# Patient Record
Sex: FEMALE | Race: BLACK OR AFRICAN AMERICAN | Employment: OTHER | URBAN - METROPOLITAN AREA
[De-identification: names, ages, dates, MRNs, and addresses within clinical notes are randomized per-mention and may not be internally consistent; named-entity substitution may affect disease eponyms.]

---

## 2017-12-08 ENCOUNTER — APPOINTMENT (OUTPATIENT)
Dept: GENERAL RADIOLOGY | Age: 82
End: 2017-12-08
Attending: EMERGENCY MEDICINE
Payer: MEDICARE

## 2017-12-08 ENCOUNTER — HOSPITAL ENCOUNTER (EMERGENCY)
Age: 82
Discharge: HOME | End: 2017-12-08
Attending: EMERGENCY MEDICINE
Payer: MEDICARE

## 2017-12-08 VITALS
DIASTOLIC BLOOD PRESSURE: 85 MMHG | WEIGHT: 180 LBS | OXYGEN SATURATION: 98 % | TEMPERATURE: 98.4 F | SYSTOLIC BLOOD PRESSURE: 184 MMHG | HEIGHT: 63 IN | HEART RATE: 70 BPM | BODY MASS INDEX: 31.89 KG/M2 | RESPIRATION RATE: 14 BRPM

## 2017-12-08 DIAGNOSIS — R10.9 FLANK PAIN: Primary | ICD-10-CM

## 2017-12-08 LAB
ATRIAL RATE: 59 BPM
BACTERIA URNS QL MICRO: 0 /HPF
CALCULATED P AXIS, ECG09: 32 DEGREES
CALCULATED R AXIS, ECG10: 3 DEGREES
CALCULATED T AXIS, ECG11: 41 DEGREES
CASTS URNS QL MICRO: NORMAL /LPF
DIAGNOSIS, 93000: NORMAL
EPI CELLS #/AREA URNS HPF: NORMAL /HPF
P-R INTERVAL, ECG05: 154 MS
Q-T INTERVAL, ECG07: 440 MS
QRS DURATION, ECG06: 74 MS
QTC CALCULATION (BEZET), ECG08: 435 MS
RBC #/AREA URNS HPF: NORMAL /HPF
VENTRICULAR RATE, ECG03: 59 BPM
WBC URNS QL MICRO: NORMAL /HPF

## 2017-12-08 PROCEDURE — 93005 ELECTROCARDIOGRAM TRACING: CPT | Performed by: EMERGENCY MEDICINE

## 2017-12-08 PROCEDURE — 81003 URINALYSIS AUTO W/O SCOPE: CPT | Performed by: EMERGENCY MEDICINE

## 2017-12-08 PROCEDURE — 99284 EMERGENCY DEPT VISIT MOD MDM: CPT | Performed by: EMERGENCY MEDICINE

## 2017-12-08 PROCEDURE — 81015 MICROSCOPIC EXAM OF URINE: CPT | Performed by: EMERGENCY MEDICINE

## 2017-12-08 PROCEDURE — 71020 XR CHEST PA LAT: CPT

## 2017-12-08 RX ORDER — LIDOCAINE 50 MG/G
PATCH TOPICAL
Qty: 20 EACH | Refills: 0 | Status: SHIPPED | OUTPATIENT
Start: 2017-12-08

## 2017-12-08 NOTE — ED NOTES
I have reviewed discharge instructions with the patient. The patient verbalized understanding. Patient left ED via Discharge Method: ambulatory to Home with (insert name of family/friend, self, transport self). Opportunity for questions and clarification provided. Patient given 1 scripts. To continue your aftercare when you leave the hospital, you may receive an automated call from our care team to check in on how you are doing. This is a free service and part of our promise to provide the best care and service to meet your aftercare needs.  If you have questions, or wish to unsubscribe from this service please call [phone]. Thank you for Choosing our [facility] Emergency Department.

## 2017-12-08 NOTE — DISCHARGE INSTRUCTIONS
Flank Pain: Care Instructions  Your Care Instructions  Flank pain is pain on the side of the back just below the rib cage and above the waist. It can be on one or both sides. Flank pain has many possible causes, including a kidney stone, a urinary tract infection, or back strain. Flank pain may get better on its own. But don't ignore new symptoms, such as fever, nausea and vomiting, urination problems, pain that gets worse, and dizziness. These may be signs of a more serious problem. You may have to have tests or other treatment. Follow-up care is a key part of your treatment and safety. Be sure to make and go to all appointments, and call your doctor if you are having problems. It's also a good idea to know your test results and keep a list of the medicines you take. How can you care for yourself at home? · Rest until you feel better. · Take pain medicines exactly as directed. ¨ If the doctor gave you a prescription medicine for pain, take it as prescribed. ¨ If you are not taking a prescription pain medicine, ask your doctor if you can take an over-the-counter pain medicine, such as acetaminophen (Tylenol), ibuprofen (Advil, Motrin), or naproxen (Aleve). Read and follow all instructions on the label. · If your doctor prescribed antibiotics, take them as directed. Do not stop taking them just because you feel better. You need to take the full course of antibiotics. · To apply heat, put a warm water bottle, a heating pad set on low, or a warm cloth on the painful area. Do not go to sleep with a heating pad on your skin. · To prevent dehydration, drink plenty of fluids, enough so that your urine is light yellow or clear like water. Choose water and other caffeine-free clear liquids until you feel better. If you have kidney, heart, or liver disease and have to limit fluids, talk with your doctor before you increase the amount of fluids you drink. When should you call for help?   Call your doctor now or seek immediate medical care if:  ? · Your flank pain gets worse. ? · You have new symptoms, such as fever, nausea, or vomiting. ? · You have symptoms of a urinary problem. For example:  ¨ You have blood or pus in your urine. ¨ You have chills or body aches. ¨ It hurts to urinate. ¨ You have groin or belly pain. ? Watch closely for changes in your health, and be sure to contact your doctor if you do not get better as expected. Where can you learn more? Go to http://royce-brian.info/. Enter S191 in the search box to learn more about \"Flank Pain: Care Instructions. \"  Current as of: March 20, 2017  Content Version: 11.4  © 9141-2173 [employer]. Care instructions adapted under license by [employer] (which disclaims liability or warranty for this information). If you have questions about a medical condition or this instruction, always ask your healthcare professional. [place] [address] any warranty or liability for your use of this information. Recent Results (from the past 8 hour(s))   URINE MICROSCOPIC    Collection Time: 12/08/17  9:48 AM   Result Value Ref Range    WBC 5-10 0 /hpf    RBC 0-3 0 /hpf    Epithelial cells 5-10 0 /hpf    Bacteria 0 0 /hpf    Casts 3-5 0 /lpf     Xr Chest Pa Lat    Result Date: 12/8/2017  CHEST X-RAY PA AND LATERAL : 12/8/2017 Indication: Chest and left flank pain Comparison: None Findings: The lung fields are clear. The heart, mediastinum, soft tissues, and bony structures are remarkable for thoracic scoliosis and a hiatal hernia. IMPRESSION: Hiatal hernia, thoracic scoliosis, clear lung fields.

## 2017-12-08 NOTE — ED NOTES
Patient noted to be sleeping, respirations are even and non-labored at this time, family not at bedside at this time.

## 2017-12-08 NOTE — ED TRIAGE NOTES
Pt to er c/o having left flank pain on and off for several months. .. Denies fever at home. .. Denies n/v//d. .. Pt denies chest pain. .. sts she is sob at times

## 2017-12-08 NOTE — ED PROVIDER NOTES
HPI Comments: 81-year-old female brought to the emergency department today for intermittent left flank pain for the last several weeks. It waxes and wanes. Pain last night. Recurred this morning    They called her primary care physician who told her to call an ambulance and go to the emergency department immediately as they could not see her today    No fever  No chills  No chest pain  No shortness of breath  occ wheezing    Patient is a 81 y.o. female presenting with flank pain. Flank Pain    Pertinent negatives include no chest pain, no fever and no abdominal pain. Past Medical History:   Diagnosis Date    Gastrointestinal Disorder     acid reflux    Hypertension        Past Surgical History:   Procedure Laterality Date    HX GYN      cervical CA, hysterectomy    HX ORTHOPAEDIC      knee         No family history on file. Social History     Social History    Marital status:      Spouse name: N/A    Number of children: N/A    Years of education: N/A     Occupational History    Not on file. Social History Main Topics    Smoking status: Never Smoker    Smokeless tobacco: Not on file    Alcohol use No    Drug use: No    Sexual activity: No     Other Topics Concern    Not on file     Social History Narrative    No narrative on file         ALLERGIES: Review of patient's allergies indicates no known allergies. Review of Systems   Constitutional: Negative for activity change, appetite change and fever. HENT: Negative. Eyes: Negative. Respiratory: Positive for wheezing. Negative for cough and shortness of breath. Cardiovascular: Negative for chest pain and palpitations. Gastrointestinal: Negative for abdominal pain, nausea and vomiting. Genitourinary: Positive for difficulty urinating, flank pain and frequency. Psychiatric/Behavioral: Negative.         Vitals:    12/08/17 0903   BP: (!) 196/98   Pulse: 71   Resp: 15   Temp: 98.4 °F (36.9 °C)   SpO2: 98%   Weight: 81.6 kg (180 lb)   Height: 5' 3\" (1.6 m)            Physical Exam   Constitutional: She is oriented to person, place, and time. She appears well-developed and well-nourished. HENT:   Head: Normocephalic and atraumatic. Eyes: Pupils are equal, round, and reactive to light. Cardiovascular: Normal rate and regular rhythm. Pulmonary/Chest: Breath sounds normal. No respiratory distress. She has no wheezes. She has no rales. She exhibits no tenderness. Abdominal: Soft. She exhibits no distension. There is no tenderness. Neurological: She is alert and oriented to person, place, and time. Skin: Skin is warm and dry. Nursing note and vitals reviewed. MDM  Number of Diagnoses or Management Options  Diagnosis management comments: Well-appearing 62-year-old female with left flank pain    Check a chest x-ray. Obtain urine. Obtain an EKG. Unremarkable physical exam.  No rash. No lesions// nothing suggestive of herpes zoster    Lungs are clear.     ED Course       Procedures

## 2020-11-27 ENCOUNTER — APPOINTMENT (OUTPATIENT)
Dept: CT IMAGING | Age: 85
DRG: 418 | End: 2020-11-27
Attending: EMERGENCY MEDICINE
Payer: MEDICARE

## 2020-11-27 ENCOUNTER — HOSPITAL ENCOUNTER (INPATIENT)
Age: 85
LOS: 10 days | Discharge: NURSING, REHAB OR LONG-TERM CARE | DRG: 418 | End: 2020-12-07
Attending: EMERGENCY MEDICINE | Admitting: SURGERY
Payer: MEDICARE

## 2020-11-27 ENCOUNTER — APPOINTMENT (OUTPATIENT)
Dept: GENERAL RADIOLOGY | Age: 85
DRG: 418 | End: 2020-11-27
Attending: EMERGENCY MEDICINE
Payer: MEDICARE

## 2020-11-27 DIAGNOSIS — R11.2 NON-INTRACTABLE VOMITING WITH NAUSEA: ICD-10-CM

## 2020-11-27 DIAGNOSIS — K81.0 ACUTE CHOLECYSTITIS: ICD-10-CM

## 2020-11-27 DIAGNOSIS — R77.8 ELEVATED TROPONIN: ICD-10-CM

## 2020-11-27 DIAGNOSIS — K44.9 LARGE HIATAL HERNIA: ICD-10-CM

## 2020-11-27 DIAGNOSIS — K31.89 ORGANOAXIAL GASTRIC VOLVULUS: Primary | ICD-10-CM

## 2020-11-27 DIAGNOSIS — R10.13 EPIGASTRIC PAIN: ICD-10-CM

## 2020-11-27 DIAGNOSIS — K80.00 CALCULUS OF GALLBLADDER WITH ACUTE CHOLECYSTITIS WITHOUT OBSTRUCTION: ICD-10-CM

## 2020-11-27 DIAGNOSIS — E87.6 HYPOKALEMIA: ICD-10-CM

## 2020-11-27 DIAGNOSIS — R94.31 ABNORMAL EKG: ICD-10-CM

## 2020-11-27 DIAGNOSIS — K80.00 ACUTE CALCULOUS CHOLECYSTITIS: ICD-10-CM

## 2020-11-27 DIAGNOSIS — I10 UNCONTROLLED HYPERTENSION: ICD-10-CM

## 2020-11-27 DIAGNOSIS — I10 HYPERTENSION, UNSPECIFIED TYPE: ICD-10-CM

## 2020-11-27 DIAGNOSIS — R07.9 CHEST PAIN, UNSPECIFIED TYPE: ICD-10-CM

## 2020-11-27 DIAGNOSIS — K80.20 GALLSTONES: ICD-10-CM

## 2020-11-27 PROBLEM — R07.89 OTHER CHEST PAIN: Status: ACTIVE | Noted: 2020-11-27

## 2020-11-27 LAB
ALBUMIN SERPL-MCNC: 3.6 G/DL (ref 3.2–4.6)
ALBUMIN/GLOB SERPL: 0.7 {RATIO} (ref 1.2–3.5)
ALP SERPL-CCNC: 112 U/L (ref 50–136)
ALT SERPL-CCNC: 16 U/L (ref 12–65)
ANION GAP SERPL CALC-SCNC: 11 MMOL/L (ref 7–16)
AST SERPL-CCNC: 28 U/L (ref 15–37)
BACTERIA URNS QL MICRO: 0 /HPF
BASOPHILS # BLD: 0 K/UL (ref 0–0.2)
BASOPHILS NFR BLD: 0 % (ref 0–2)
BILIRUB SERPL-MCNC: 0.7 MG/DL (ref 0.2–1.1)
BUN SERPL-MCNC: 12 MG/DL (ref 8–23)
CALCIUM SERPL-MCNC: 9 MG/DL (ref 8.3–10.4)
CASTS URNS QL MICRO: 0 /LPF
CHLORIDE SERPL-SCNC: 100 MMOL/L (ref 98–107)
CO2 SERPL-SCNC: 27 MMOL/L (ref 21–32)
CREAT SERPL-MCNC: 0.77 MG/DL (ref 0.6–1)
CRYSTALS URNS QL MICRO: 0 /LPF
DIFFERENTIAL METHOD BLD: ABNORMAL
EOSINOPHIL # BLD: 0 K/UL (ref 0–0.8)
EOSINOPHIL NFR BLD: 0 % (ref 0.5–7.8)
EPI CELLS #/AREA URNS HPF: 0 /HPF
ERYTHROCYTE [DISTWIDTH] IN BLOOD BY AUTOMATED COUNT: 15.3 % (ref 11.9–14.6)
GLOBULIN SER CALC-MCNC: 4.9 G/DL (ref 2.3–3.5)
GLUCOSE SERPL-MCNC: 124 MG/DL (ref 65–100)
HCT VFR BLD AUTO: 44.6 % (ref 35.8–46.3)
HGB BLD-MCNC: 14.6 G/DL (ref 11.7–15.4)
IMM GRANULOCYTES # BLD AUTO: 0.1 K/UL (ref 0–0.5)
IMM GRANULOCYTES NFR BLD AUTO: 0 % (ref 0–5)
LACTATE SERPL-SCNC: 1 MMOL/L (ref 0.4–2)
LIPASE SERPL-CCNC: 72 U/L (ref 73–393)
LYMPHOCYTES # BLD: 1.4 K/UL (ref 0.5–4.6)
LYMPHOCYTES NFR BLD: 8 % (ref 13–44)
MCH RBC QN AUTO: 26.4 PG (ref 26.1–32.9)
MCHC RBC AUTO-ENTMCNC: 32.7 G/DL (ref 31.4–35)
MCV RBC AUTO: 80.5 FL (ref 79.6–97.8)
MONOCYTES # BLD: 1 K/UL (ref 0.1–1.3)
MONOCYTES NFR BLD: 6 % (ref 4–12)
MUCOUS THREADS URNS QL MICRO: 0 /LPF
NEUTS SEG # BLD: 14.8 K/UL (ref 1.7–8.2)
NEUTS SEG NFR BLD: 86 % (ref 43–78)
NRBC # BLD: 0 K/UL (ref 0–0.2)
OTHER OBSERVATIONS,UCOM: NORMAL
PLATELET # BLD AUTO: 286 K/UL (ref 150–450)
PMV BLD AUTO: 10.8 FL (ref 9.4–12.3)
POTASSIUM SERPL-SCNC: 3.1 MMOL/L (ref 3.5–5.1)
PROCALCITONIN SERPL-MCNC: <0.05 NG/ML
PROT SERPL-MCNC: 8.5 G/DL (ref 6.3–8.2)
RBC # BLD AUTO: 5.54 M/UL (ref 4.05–5.2)
RBC #/AREA URNS HPF: 0 /HPF
SODIUM SERPL-SCNC: 138 MMOL/L (ref 136–145)
TROPONIN-HIGH SENSITIVITY: 56.7 PG/ML (ref 0–14)
WBC # BLD AUTO: 17.3 K/UL (ref 4.3–11.1)
WBC URNS QL MICRO: NORMAL /HPF

## 2020-11-27 PROCEDURE — 74011000250 HC RX REV CODE- 250: Performed by: SURGERY

## 2020-11-27 PROCEDURE — 74011000636 HC RX REV CODE- 636: Performed by: EMERGENCY MEDICINE

## 2020-11-27 PROCEDURE — 81015 MICROSCOPIC EXAM OF URINE: CPT

## 2020-11-27 PROCEDURE — 96375 TX/PRO/DX INJ NEW DRUG ADDON: CPT

## 2020-11-27 PROCEDURE — 80053 COMPREHEN METABOLIC PANEL: CPT

## 2020-11-27 PROCEDURE — 84145 PROCALCITONIN (PCT): CPT

## 2020-11-27 PROCEDURE — 85025 COMPLETE CBC W/AUTO DIFF WBC: CPT

## 2020-11-27 PROCEDURE — 2709999900 HC NON-CHARGEABLE SUPPLY

## 2020-11-27 PROCEDURE — 96361 HYDRATE IV INFUSION ADD-ON: CPT

## 2020-11-27 PROCEDURE — 84484 ASSAY OF TROPONIN QUANT: CPT

## 2020-11-27 PROCEDURE — 96374 THER/PROPH/DIAG INJ IV PUSH: CPT

## 2020-11-27 PROCEDURE — 65660000000 HC RM CCU STEPDOWN

## 2020-11-27 PROCEDURE — 99223 1ST HOSP IP/OBS HIGH 75: CPT | Performed by: SURGERY

## 2020-11-27 PROCEDURE — 83605 ASSAY OF LACTIC ACID: CPT

## 2020-11-27 PROCEDURE — 83690 ASSAY OF LIPASE: CPT

## 2020-11-27 PROCEDURE — 74011250636 HC RX REV CODE- 250/636: Performed by: EMERGENCY MEDICINE

## 2020-11-27 PROCEDURE — 77030038269 HC DRN EXT URIN PURWCK BARD -A

## 2020-11-27 PROCEDURE — 74011000258 HC RX REV CODE- 258: Performed by: EMERGENCY MEDICINE

## 2020-11-27 PROCEDURE — 74011000250 HC RX REV CODE- 250: Performed by: NURSE PRACTITIONER

## 2020-11-27 PROCEDURE — 71045 X-RAY EXAM CHEST 1 VIEW: CPT

## 2020-11-27 PROCEDURE — 93005 ELECTROCARDIOGRAM TRACING: CPT | Performed by: EMERGENCY MEDICINE

## 2020-11-27 PROCEDURE — 74177 CT ABD & PELVIS W/CONTRAST: CPT

## 2020-11-27 PROCEDURE — 74011250636 HC RX REV CODE- 250/636: Performed by: SURGERY

## 2020-11-27 PROCEDURE — 81003 URINALYSIS AUTO W/O SCOPE: CPT

## 2020-11-27 PROCEDURE — 99284 EMERGENCY DEPT VISIT MOD MDM: CPT

## 2020-11-27 PROCEDURE — 51701 INSERT BLADDER CATHETER: CPT

## 2020-11-27 RX ORDER — DEXTROSE, SODIUM CHLORIDE, AND POTASSIUM CHLORIDE 5; .45; .15 G/100ML; G/100ML; G/100ML
100 INJECTION INTRAVENOUS CONTINUOUS
Status: DISCONTINUED | OUTPATIENT
Start: 2020-11-27 | End: 2020-11-28

## 2020-11-27 RX ORDER — OMEPRAZOLE 20 MG/1
20 CAPSULE, DELAYED RELEASE ORAL 2 TIMES DAILY
COMMUNITY

## 2020-11-27 RX ORDER — ONDANSETRON 2 MG/ML
4 INJECTION INTRAMUSCULAR; INTRAVENOUS
Status: COMPLETED | OUTPATIENT
Start: 2020-11-27 | End: 2020-11-27

## 2020-11-27 RX ORDER — ENALAPRILAT 1.25 MG/ML
1.25 INJECTION INTRAVENOUS EVERY 6 HOURS
Status: DISCONTINUED | OUTPATIENT
Start: 2020-11-28 | End: 2020-11-28

## 2020-11-27 RX ORDER — MORPHINE SULFATE 10 MG/ML
2 INJECTION, SOLUTION INTRAMUSCULAR; INTRAVENOUS
Status: DISCONTINUED | OUTPATIENT
Start: 2020-11-27 | End: 2020-11-30 | Stop reason: SDUPTHER

## 2020-11-27 RX ORDER — ACETAMINOPHEN 500 MG
1000 TABLET ORAL
Status: DISCONTINUED | OUTPATIENT
Start: 2020-11-27 | End: 2020-11-27

## 2020-11-27 RX ORDER — SODIUM CHLORIDE 0.9 % (FLUSH) 0.9 %
10 SYRINGE (ML) INJECTION
Status: COMPLETED | OUTPATIENT
Start: 2020-11-27 | End: 2020-11-27

## 2020-11-27 RX ORDER — ONDANSETRON 2 MG/ML
4 INJECTION INTRAMUSCULAR; INTRAVENOUS
Status: DISCONTINUED | OUTPATIENT
Start: 2020-11-27 | End: 2020-12-07 | Stop reason: HOSPADM

## 2020-11-27 RX ORDER — ENOXAPARIN SODIUM 100 MG/ML
40 INJECTION SUBCUTANEOUS EVERY 24 HOURS
Status: DISCONTINUED | OUTPATIENT
Start: 2020-11-28 | End: 2020-12-01

## 2020-11-27 RX ORDER — MORPHINE SULFATE 4 MG/ML
4 INJECTION INTRAVENOUS
Status: COMPLETED | OUTPATIENT
Start: 2020-11-27 | End: 2020-11-27

## 2020-11-27 RX ORDER — METOPROLOL TARTRATE 5 MG/5ML
5 INJECTION INTRAVENOUS
Status: DISCONTINUED | OUTPATIENT
Start: 2020-11-27 | End: 2020-11-28

## 2020-11-27 RX ORDER — MORPHINE SULFATE 10 MG/ML
2 INJECTION, SOLUTION INTRAMUSCULAR; INTRAVENOUS
Status: DISCONTINUED | OUTPATIENT
Start: 2020-11-27 | End: 2020-11-27

## 2020-11-27 RX ORDER — TRAMADOL HYDROCHLORIDE 50 MG/1
50 TABLET ORAL
Status: ON HOLD | COMMUNITY
End: 2020-12-23 | Stop reason: SDUPTHER

## 2020-11-27 RX ADMIN — PIPERACILLIN SODIUM AND TAZOBACTAM SODIUM 4.5 G: 4; .5 INJECTION, POWDER, LYOPHILIZED, FOR SOLUTION INTRAVENOUS at 18:32

## 2020-11-27 RX ADMIN — IOPAMIDOL 100 ML: 755 INJECTION, SOLUTION INTRAVENOUS at 16:26

## 2020-11-27 RX ADMIN — SODIUM CHLORIDE 1000 ML: 900 INJECTION, SOLUTION INTRAVENOUS at 17:17

## 2020-11-27 RX ADMIN — Medication 10 ML: at 16:26

## 2020-11-27 RX ADMIN — SODIUM CHLORIDE 100 ML: 900 INJECTION, SOLUTION INTRAVENOUS at 16:26

## 2020-11-27 RX ADMIN — FAMOTIDINE 20 MG: 10 INJECTION INTRAVENOUS at 22:07

## 2020-11-27 RX ADMIN — ENALAPRILAT 1.25 MG: 1.25 INJECTION INTRAVENOUS at 22:32

## 2020-11-27 RX ADMIN — MORPHINE SULFATE 4 MG: 4 INJECTION INTRAVENOUS at 17:16

## 2020-11-27 RX ADMIN — ONDANSETRON 4 MG: 2 INJECTION INTRAMUSCULAR; INTRAVENOUS at 17:17

## 2020-11-27 RX ADMIN — POTASSIUM CHLORIDE, DEXTROSE MONOHYDRATE AND SODIUM CHLORIDE 100 ML/HR: 150; 5; 450 INJECTION, SOLUTION INTRAVENOUS at 22:02

## 2020-11-27 NOTE — ED TRIAGE NOTES
Pt arrives via EMS from home. Sent by urgent care for abd pain with nv for further evaluation. Was tested there and was covid negative. States symptoms started at [facility] yesterday. , /120. Given 4mg zofran and 750 mL of normal saline with EMS.

## 2020-11-27 NOTE — ED PROVIDER NOTES
Patient started yesterday at 9 AM with epigastric and diffuse abdominal pain radiating outwards. Has some pain rating up into the chest.  Has some pain radiating to bilateral lower flanks. Nausea and vomiting present. No dysuria hematuria. No diarrhea or constipation. No shortness of breath. Went to PCP or urgent care and sent here for further evaluation. The history is provided by the patient. No  was used. Abdominal Pain    This is a new problem. The current episode started yesterday. The problem occurs constantly. The problem has been gradually worsening. The pain is associated with an unknown factor. The pain is located in the epigastric region and generalized abdominal region. The quality of the pain is aching and sharp. The pain is moderate. Associated symptoms include nausea and vomiting. Pertinent negatives include no fever, no diarrhea, no melena, no constipation, no dysuria, no hematuria, no headaches, no chest pain and no back pain. The pain is worsened by palpation. The pain is relieved by nothing. Her past medical history is significant for GERD. The patient's surgical history includes hysterectomy. Past Medical History:   Diagnosis Date    Gastrointestinal disorder     acid reflux    Hypertension        Past Surgical History:   Procedure Laterality Date    HX GYN      cervical CA, hysterectomy    HX ORTHOPAEDIC      knee         History reviewed. No pertinent family history.     Social History     Socioeconomic History    Marital status:      Spouse name: Not on file    Number of children: Not on file    Years of education: Not on file    Highest education level: Not on file   Occupational History    Not on file   Social Needs    Financial resource strain: Not on file    Food insecurity     Worry: Not on file     Inability: Not on file    Transportation needs     Medical: Not on file     Non-medical: Not on file   Tobacco Use    Smoking status: Never Smoker    Smokeless tobacco: Never Used   Substance and Sexual Activity    Alcohol use: No    Drug use: No    Sexual activity: Never   Lifestyle    Physical activity     Days per week: Not on file     Minutes per session: Not on file    Stress: Not on file   Relationships    Social connections     Talks on phone: Not on file     Gets together: Not on file     Attends Anabaptist service: Not on file     Active member of club or organization: Not on file     Attends meetings of clubs or organizations: Not on file     Relationship status: Not on file    Intimate partner violence     Fear of current or ex partner: Not on file     Emotionally abused: Not on file     Physically abused: Not on file     Forced sexual activity: Not on file   Other Topics Concern    Not on file   Social History Narrative    Not on file         ALLERGIES: Patient has no known allergies. Review of Systems   Constitutional: Negative for chills and fever. HENT: Negative for rhinorrhea and sore throat. Eyes: Negative for pain and redness. Respiratory: Negative for chest tightness, shortness of breath and wheezing. Cardiovascular: Negative for chest pain and leg swelling. Gastrointestinal: Positive for abdominal pain, nausea and vomiting. Negative for constipation, diarrhea and melena. Genitourinary: Negative for dysuria and hematuria. Musculoskeletal: Negative for back pain, gait problem, neck pain and neck stiffness. Skin: Negative for color change and rash. Neurological: Negative for weakness, numbness and headaches. Vitals:    11/27/20 1354   BP: (!) 175/99   Pulse: 80   Resp: 16   Temp: 98.4 °F (36.9 °C)   SpO2: 94%   Weight: 81.6 kg (180 lb)   Height: 5' 3\" (1.6 m)            Physical Exam  Constitutional:       Appearance: Normal appearance. She is well-developed. HENT:      Head: Normocephalic and atraumatic. Neck:      Musculoskeletal: Normal range of motion and neck supple.    Cardiovascular: Rate and Rhythm: Normal rate and regular rhythm. Pulmonary:      Effort: Pulmonary effort is normal.      Breath sounds: Normal breath sounds. Abdominal:      General: Bowel sounds are normal.      Palpations: Abdomen is soft. Tenderness: There is abdominal tenderness (diffuse greatest in epigastric). There is guarding. Musculoskeletal: Normal range of motion. General: No swelling. Skin:     General: Skin is warm and dry. Neurological:      General: No focal deficit present. Mental Status: She is alert and oriented to person, place, and time. 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 Also with gallstones and cholecystitis. Will consult surgery. Will admit.         Amount and/or Complexity of Data Reviewed  Clinical lab tests: ordered and reviewed  Tests in the radiology section of CPT®: ordered and reviewed  Tests in the medicine section of CPT®: ordered and reviewed    Patient Progress  Patient progress: stable         Procedures      Results Include:    Recent Results (from the past 24 hour(s))   EKG, 12 LEAD, INITIAL    Collection Time: 11/27/20  1:55 PM   Result Value Ref Range    Ventricular Rate 76 BPM    Atrial Rate 76 BPM    P-R Interval 150 ms    QRS Duration 76 ms    Q-T Interval 442 ms    QTC Calculation (Bezet) 497 ms    Calculated P Axis 46 degrees    Calculated R Axis -11 degrees    Calculated T Axis 42 degrees    Diagnosis       Normal sinus rhythm  Prolonged QT  Abnormal ECG  When compared with ECG of 08-DEC-2017 09:14,  QT has lengthened     CBC WITH AUTOMATED DIFF    Collection Time: 11/27/20  2:01 PM   Result Value Ref Range    WBC 17.3 (H) 4.3 - 11.1 K/uL    RBC 5.54 (H) 4.05 - 5.2 M/uL    HGB 14.6 11.7 - 15.4 g/dL    HCT 44.6 35.8 - 46.3 %    MCV 80.5 79.6 - 97.8 FL    MCH 26.4 26.1 - 32.9 PG    MCHC 32.7 31.4 - 35.0 g/dL    RDW 15.3 (H) 11.9 - 14.6 %    PLATELET 286 150 - 450 K/uL    MPV 10.8 9.4 - 12.3 FL    ABSOLUTE NRBC 0.00 0.0 - 0.2 K/uL    DF AUTOMATED      NEUTROPHILS 86 (H) 43 - 78 %    LYMPHOCYTES 8 (L) 13 - 44 %    MONOCYTES 6 4.0 - 12.0 %    EOSINOPHILS 0 (L) 0.5 - 7.8 %    BASOPHILS 0 0.0 - 2.0 %    IMMATURE GRANULOCYTES 0 0.0 - 5.0 %    ABS. NEUTROPHILS 14.8 (H) 1.7 - 8.2 K/UL    ABS. LYMPHOCYTES 1.4 0.5 - 4.6 K/UL    ABS. MONOCYTES 1.0 0.1 - 1.3 K/UL    ABS. EOSINOPHILS 0.0 0.0 - 0.8 K/UL    ABS. BASOPHILS 0.0 0.0 - 0.2 K/UL    ABS. IMM. GRANS. 0.1 0.0 - 0.5 K/UL   METABOLIC PANEL, COMPREHENSIVE    Collection Time: 11/27/20  2:01 PM   Result Value Ref Range    Sodium 138 136 - 145 mmol/L    Potassium 3.1 (L) 3.5 - 5.1 mmol/L    Chloride 100 98 - 107 mmol/L    CO2 27 21 - 32 mmol/L    Anion gap 11 7 - 16 mmol/L    Glucose 124 (H) 65 - 100 mg/dL    BUN 12 8 - 23 MG/DL    Creatinine 0.77 0.6 - 1.0 MG/DL    GFR est AA >60 >60 ml/min/1.73m2    GFR est non-AA >60 >60 ml/min/1.73m2    Calcium 9.0 8.3 - 10.4 MG/DL    Bilirubin, total 0.7 0.2 - 1.1 MG/DL    ALT (SGPT) 16 12 - 65 U/L    AST (SGOT) 28 15 - 37 U/L    Alk. phosphatase 112 50 - 136 U/L    Protein, total 8.5 (H) 6.3 - 8.2 g/dL    Albumin 3.6 3.2 - 4.6 g/dL    Globulin 4.9 (H) 2.3 - 3.5 g/dL    A-G Ratio 0.7 (L) 1.2 - 3.5     LIPASE    Collection Time: 11/27/20  2:01 PM   Result Value Ref Range    Lipase 72 (L) 73 - 393 U/L   PROCALCITONIN    Collection Time: 11/27/20  2:01 PM   Result Value Ref Range    Procalcitonin <0.05 ng/mL   URINE MICROSCOPIC    Collection Time: 11/27/20  5:33 PM   Result Value Ref Range    WBC 0-3 0 /hpf    RBC 0 0 /hpf    Epithelial cells 0 0 /hpf    Bacteria 0 0 /hpf    Casts 0 0 /lpf    Crystals, urine 0 0 /LPF    Mucus 0 0 /lpf    Other observations RESULTS VERIFIED MANUALLY       CT ABD PELV W CONT (Final result)   Result time 11/27/20 16:49:17   Final result by George Draper MD (11/27/20 16:49:17)                 Impression:     IMPRESSION:   1.  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 2. Gallstones and some pericholecystic fluid, if cholecystitis is suspected   dedicated ultrasound should be considered. 3. There does appear to be a tiny common duct stone distally all below the duct   does not appear dilated with a stone measuring 3 mm. 4. Simple cyst left kidney with probable complex cyst left kidney if imaging   confirmation is desired nonemergent ultrasound should be considered. 5. Multiple diverticula without evidence of active diverticulitis. Initial impression was called to Dr. Karlos Burks in the ER at 4:49 PM November 27, 2020. Narrative:     CT abdomen and pelvis done with IV contrast using ER protocol November 27, 2020     Reference exam: None     Indication: Abdomen pain with nausea vomiting     Technique: Radiation dose reduction techniques were used for this study using   one or more of the following: automated exposure control; adjustment of mA   and/or kV (according to patient size);  iterative reconstruction. 5 mm axial   images were taken through the abdomen and pelvis using IV contrast using ER   protocol. 100 cc Isovue 370 IV contrast was administered to better evaluate the   abdominal and pelvic contents. Abdomen: The lung bases show a left-sided hiatal hernia with some adjacent   compressive atelectasis. Air-filled loop of bowel seen between the liver and the   right diaphragm, with diaphragm slips seen along the posterior lateral right   liver, the gallbladder shows multiple gallstones and is somewhat distended with   some adjacent pericholecystic fluid, the pancreas, spleen, adrenals appear   normal. Right kidney appears normal, left kidney midpole shows a 8mm 43   Hounsfield unit minimally complex cystic lesion with the midpole laterally   showing an exophytic 7 mm 15 Hounsfield unit probable simple cyst. There is no   intraperitoneal free air, nor abnormal adenopathy seen.  Patient has a high   riding cecum, the appendix is not seen. Pelvis: There are multiple diverticula within an elongated sigmoid colon, the   bladder and pelvic sidewalls appear normal, the uterus is absent. There is no   intraperitoneal free air, ascites, nor abnormal adenopathy seen.                       XR CHEST PORT (Final result)   Result time 11/27/20 18:12:13   Final result by Kaylin Stock MD (11/27/20 18:12:13)                 Impression:     IMPRESSION: Large hiatal hernia, grossly stable compared to prior chest x-rays. Narrative:     Chest X-ray     INDICATION: Epigastric pain     A portable AP view of the chest was obtained. FINDINGS: There is a retrocardiac hiatal hernia.  There are no pulmonary   infiltrates.  The heart size is normal.  The bony thorax is intact.                      EKG: normal sinus rhythm, nonspecific ST and T waves changes. Rate 76.

## 2020-11-27 NOTE — H&P
H&P/Consult Note/Progress Note/Office Note:   Felipe Forbes  MRN: [id number]  :1935  Age:85 y.o.    HPI: Felipe Forbes is a 81 y.o. female with a long h/o gallstones and large mixed hiatal and paraesophageal hernia dating back at least to 2016   from studies at [facility] where she was followed and no surgery recommended. She came to the ER on 20 complaining of a 2-day history of pain that she states was primarily involving both of her flanks radiating to her epigastric region as well as the upper chest.    She specifically says she does not think the pain started in her epigastric region or her chest.    The pain was constant and progressive. Nothing in particular made it better or worse. She had associated nausea or vomiting. She was seen in an urgent treatment center and sent to the ER for further evaluation. While in the ER she is found to be significantly hypertensive with a blood pressure 175/99. Her troponin was markedly elevated at 56.7. WBC 17.3k;  LFTs and lipase were normal.  Lactic acid was also normal at 1.0. Urinalysis  Results for Yamileth Schaeffer (MRN [id number]) as of 2020 19:29   Ref. Range 2020 17:33   Epithelial cells Latest Ref Range: 0 /hpf 0   Mucus Latest Ref Range: 0 /lpf 0   WBC Latest Ref Range: 0 /hpf 0-3   RBC Latest Ref Range: 0 /hpf 0   Bacteria Latest Ref Range: 0 /hpf 0   Crystals, urine Latest Ref Range: 0 /LPF 0   Casts Latest Ref Range: 0 /lpf 0   Other observations Latest Units:   RESULTS VERIFIED MANUALLY           She has a h/o GERD and prior hysterectomy.   Her large hiatal hernia has been known since at least  and has been imaged and evaluated multiple times at [address] (now [facility])   She was evaluated at [place] with esophagogram and CT imaging and she reports that the surgeon told her that she was not a surgical candidate for the hiatal hernia  and that her operative risks related to hiatal hernia repair were too high to proceed. She is unsure of her remote history but reports she thinks she had ovarian cancer in the 1950s   and had a hysterectomy followed by chemotherapy and radiation treatment. 10/14/16 CT chest (at [facility])  Impression:  Stable small pulmonary nodule in the right middle lobe.  Indeterminate.  Consider CT chest surveillance 6 months. No thoracic adenopathy. Moderate hiatal hernia. Gallstones. 4/23/18 CT chest (at [facility])  Impression:  Stable 5 mm nodule in the right middle lobe for 2 years. Benign in nature. No thoracic adenopathy. Large hiatal hernia. Multiple gallstones        11/27/18 esophagram (at [facility])  Large hiatal hernia. This is a mixed type hiatal hernia with the GE junction above the diaphragm, however, there is also a paraesophageal component involving the gastric fundus and body. Free GE reflux is demonstrated into the distal  2/3rds of the esophagus, however, there is no associated esophageal ulceration, erosions or mass lesion. There is no esophageal stenosis. A 13 mm diameter barium tablet passed rapidly to the esophagus to the stomach without obstruction. .    IMPRESSION:  1. Large mixed sliding and paraesophageal type hiatal hernia with a portion of the gastric fundus and gastric body in the lower chest.  2.  Extensive gastroesophageal reflux without evidence of associated esophageal ulceration or mass. .             11/27/20 portable CXR  Hx: Epigastric pain    There is a retrocardiac hiatal hernia. There are no pulmonary  infiltrates. The heart size is normal.  The bony thorax is intact.       IMPRESSION: Large hiatal hernia, grossly stable compared to prior chest x-rays.       11/27/20 EKG  NSR; Prolonged QT   Abnormal ECG   when compared with ECG of 08-DEC-2017 09:14, QT has lengthened       11/27/20 CT abd/pelvis with IV contrast  Abdomen: The lung bases show a left-sided hiatal hernia with some adjacent  compressive atelectasis.  Air-filled loop of bowel seen between the liver and the  right diaphragm, with diaphragm slips seen along the posterior lateral right  liver, the gallbladder shows multiple gallstones and is somewhat distended with  some adjacent pericholecystic fluid, the pancreas, spleen, adrenals appear  normal. Right kidney appears normal, left kidney midpole shows a 8mm 43  Hounsfield unit minimally complex cystic lesion with the midpole laterally  showing an exophytic 7 mm 15 Hounsfield unit probable simple cyst. There is no  intraperitoneal free air, nor abnormal adenopathy seen. Patient has a high  riding cecum, the appendix is not seen.     Pelvis: There are multiple diverticula within an elongated sigmoid colon, the  bladder and pelvic sidewalls appear normal, the uterus is absent. There is no  intraperitoneal free air, ascites, nor abnormal adenopathy seen.     IMPRESSION:  1. 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 2. Gallstones and some pericholecystic fluid, if cholecystitis is suspected dedicated ultrasound should be considered. 3. There does appear to be a tiny common duct stone distally all below the duct does not appear dilated with a stone measuring 3 mm. 4. Simple cyst left kidney with probable complex cyst left kidney if imaging confirmation is desired nonemergent US should be considered.   5. Multiple diverticula without evidence of active diverticulitis.           Past Medical History:   Diagnosis Date    Gastrointestinal disorder     acid reflux    Hypertension      Past Surgical History:   Procedure Laterality Date    HX GYN      cervical CA, hysterectomy    HX ORTHOPAEDIC      knee     Current Facility-Administered Medications   Medication Dose Route Frequency    [START ON 11/28/2020] enoxaparin (LOVENOX) injection 40 mg  40 mg SubCUTAneous Q24H    acetaminophen (TYLENOL) tablet 1,000 mg  1,000 mg Oral Q6H PRN    ondansetron (ZOFRAN) injection 4 mg  4 mg IntraVENous Q4H PRN    famotidine (PF) (PEPCID) 20 mg in 0.9% sodium chloride 10 mL injection  20 mg IntraVENous Q12H    dextrose 5% - 0.45% NaCl with KCl 20 mEq/L infusion  100 mL/hr IntraVENous CONTINUOUS    morphine 10 mg/ml injection 2 mg  2 mg IntraVENous Q1H PRN     Current Outpatient Medications   Medication Sig    lidocaine (LIDODERM) 5 % Apply patch to the affected area for 12 hours a day and remove for 12 hours a day.  amlodipine-benazepril (LOTREL) 5-20 mg per capsule Take 1 Cap by mouth daily.  clidinium-chlordiazepoxide (LIBRAX) 5-2.5 mg per capsule Take 1 Cap by mouth 4 times daily (before meals and nightly).  amoxicillin (AMOXIL) 500 mg capsule Take 500 mg by mouth. Patient has no known allergies. Social History     Socioeconomic History    Marital status:      Spouse name: Not on file    Number of children: Not on file    Years of education: Not on file    Highest education level: Not on file   Tobacco Use    Smoking status: Never Smoker    Smokeless tobacco: Never Used   Substance and Sexual Activity    Alcohol use: No    Drug use: No    Sexual activity: Never     Social History     Tobacco Use   Smoking Status Never Smoker   Smokeless Tobacco Never Used     History reviewed. No pertinent family history. ROS: The patient has no difficulty with chest pain or shortness of breath. No fever or chills. Comprehensive review of systems was otherwise unremarkable except as noted above. Physical Exam:   Visit Vitals  BP (!) 175/99   Pulse 80   Temp 98.4 °F (36.9 °C)   Resp 16   Ht 5' 3\" (1.6 m)   Wt 180 lb (81.6 kg)   SpO2 94%   BMI 31.89 kg/m²     Vitals:    11/27/20 1354   BP: (!) 175/99   Pulse: 80   Resp: 16   Temp: 98.4 °F (36.9 °C)   SpO2: 94%   Weight: 180 lb (81.6 kg)   Height: 5' 3\" (1.6 m)     No intake/output data recorded. No intake/output data recorded. Constitutional: Alert, oriented, cooperative patient in no acute distress; appears stated age    Eyes:Sclera are clear.  EOMs intact  ENMT: no external lesions gross hearing normal; no obvious neck masses, no ear or lip lesions, nares normal  CV: RRR. Normal perfusion  Resp: No JVD. Breathing is  non-labored; no audible wheezing. GI: soft and non-distended; minimal epigastric pain; no guarding or peritoneal signs; no sign RUQ tenderness      Musculoskeletal: unremarkable with normal function. No embolic signs or cyanosis. Neuro:  Oriented; moves all 4; no focal deficits  Psychiatric: normal affect and mood, no memory impairment    Recent vitals (if inpt):  Patient Vitals for the past 24 hrs:   BP Temp Pulse Resp SpO2 Height Weight   11/27/20 1354 (!) 175/99 98.4 °F (36.9 °C) 80 16 94 % 5' 3\" (1.6 m) 180 lb (81.6 kg)       Labs:  Recent Labs     11/27/20  1401   WBC 17.3*   HGB 14.6         K 3.1*      CO2 27   BUN 12   CREA 0.77   *   TBILI 0.7   ALT 16      LPSE 72*       Lab Results   Component Value Date/Time    WBC 17.3 (H) 11/27/2020 02:01 PM    HGB 14.6 11/27/2020 02:01 PM    PLATELET 798 05/67/7800 02:01 PM    Sodium 138 11/27/2020 02:01 PM    Potassium 3.1 (L) 11/27/2020 02:01 PM    Chloride 100 11/27/2020 02:01 PM    CO2 27 11/27/2020 02:01 PM    BUN 12 11/27/2020 02:01 PM    Creatinine 0.77 11/27/2020 02:01 PM    Glucose 124 (H) 11/27/2020 02:01 PM    Bilirubin, total 0.7 11/27/2020 02:01 PM    ALT (SGPT) 16 11/27/2020 02:01 PM    Alk. phosphatase 112 11/27/2020 02:01 PM    Lipase 72 (L) 11/27/2020 02:01 PM       CT Results  (Last 48 hours)               11/27/20 1639  CT ABD PELV W CONT Final result    Impression:  IMPRESSION:   1. 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 2. Gallstones and some pericholecystic fluid, if cholecystitis is suspected   dedicated ultrasound should be considered. 3. There does appear to be a tiny common duct stone distally all below the duct   does not appear dilated with a stone measuring 3 mm.    4. Simple cyst left kidney with probable complex cyst left kidney if imaging   confirmation is desired nonemergent ultrasound should be considered. 5. Multiple diverticula without evidence of active diverticulitis. Initial impression was called to Dr. Gordon Pac in the ER at 4:49 PM November 27, 2020. Narrative:  CT abdomen and pelvis done with IV contrast using ER protocol November 27, 2020       Reference exam: None       Indication: Abdomen pain with nausea vomiting       Technique: Radiation dose reduction techniques were used for this study using   one or more of the following: automated exposure control; adjustment of mA   and/or kV (according to patient size);  iterative reconstruction. 5 mm axial   images were taken through the abdomen and pelvis using IV contrast using ER   protocol. 100 cc Isovue 370 IV contrast was administered to better evaluate the   abdominal and pelvic contents. Abdomen: The lung bases show a left-sided hiatal hernia with some adjacent   compressive atelectasis. Air-filled loop of bowel seen between the liver and the   right diaphragm, with diaphragm slips seen along the posterior lateral right   liver, the gallbladder shows multiple gallstones and is somewhat distended with   some adjacent pericholecystic fluid, the pancreas, spleen, adrenals appear   normal. Right kidney appears normal, left kidney midpole shows a 8mm 43   Hounsfield unit minimally complex cystic lesion with the midpole laterally   showing an exophytic 7 mm 15 Hounsfield unit probable simple cyst. There is no   intraperitoneal free air, nor abnormal adenopathy seen. Patient has a high   riding cecum, the appendix is not seen. Pelvis: There are multiple diverticula within an elongated sigmoid colon, the   bladder and pelvic sidewalls appear normal, the uterus is absent. There is no   intraperitoneal free air, ascites, nor abnormal adenopathy seen.                chest X-ray      I reviewed recent labs, recent radiologic studies, and pertinent records including other doctor notes if needed. I independently reviewed radiology images for studies I described above or studies I have ordered. Admission date (for inpatients): 11/27/2020   * No surgery found *  * No surgery found *    ASSESSMENT/PLAN:  Problem List  Date Reviewed: 11/27/2020          Codes Class Noted    Large hiatal hernia (mixed sliding and paraesophageal dating back to 2016) ICD-10-CM: K44.9  ICD-9-CM: 553.3  11/27/2020        Non-intractable vomiting with nausea ICD-10-CM: R11.2  ICD-9-CM: 787.01  11/27/2020        Epigastric pain ICD-10-CM: R10.13  ICD-9-CM: 789.06  11/27/2020        Elevated troponin ICD-10-CM: R77.8  ICD-9-CM: 790.6  11/27/2020        Uncontrolled hypertension ICD-10-CM: I10  ICD-9-CM: 401.9  11/27/2020        Gallstones (since at least 2016) ICD-10-CM: K80.20  ICD-9-CM: 574.20  11/27/2020        Abnormal EKG ICD-10-CM: R94.31  ICD-9-CM: 794.31  11/27/2020        * (Principal) Chest pain ICD-10-CM: R07.9  ICD-9-CM: 786.50  11/27/2020            Principal Problem:    Chest pain (11/27/2020)    Active Problems:    Large hiatal hernia (mixed sliding and paraesophageal dating back to 2016) (11/27/2020)      Non-intractable vomiting with nausea (11/27/2020)      Epigastric pain (11/27/2020)      Elevated troponin (11/27/2020)      Uncontrolled hypertension (11/27/2020)      Gallstones (since at least 2016) (11/27/2020)      Abnormal EKG (11/27/2020)           Uncontrolled HTN, chest pain, abnormal EKG, and elevated troponin   Admit to telemetry  Consult cardiology   Echo in am  Troponin in am  Will IV BP control     Large Mixed Hiatal and Paraesophageal Hernia/age 85  She was not felt to be a surgical candidate for this problem when evaluated at [facility] in approximately 2016 and 2017  Her risks were felt to be prohibitive. This has not changed unless a life or death scenario develops as a result of the hernia.     Admit  Bowel rest/NPO  IVF   Serial exams   It is encouraging that her lactic acid is normal and that her chest pain has subsided substantially while in the emergency department. We will repeat her lactic acid and white count in a.m. Gallstones  Abdominal ultrasound performed to evaluate gallbladder further. Biat flank pain  Etiology unclear  Urinalysis normal  IV Zosyn x1 given by ER  US pending  Pain meds as needed ordered for symptomatic relief    Renal cysts  Ultrasound ordered to evaluate especially in light of bilateral flank pain        I have personally performed a face-to-face diagnostic evaluation and management  service on this patient. I have independently seen the patient. I have independently obtained the above history from the patient/family. I have independently examined the patient with above findings. I have independently reviewed data/labs for this patient and developed the above plan of care (MDM). Signed: Nikki Peace.  Ilene Miles MD, FACS

## 2020-11-28 ENCOUNTER — APPOINTMENT (OUTPATIENT)
Dept: ULTRASOUND IMAGING | Age: 85
DRG: 418 | End: 2020-11-28
Attending: SURGERY
Payer: MEDICARE

## 2020-11-28 LAB
ALBUMIN SERPL-MCNC: 2.9 G/DL (ref 3.2–4.6)
ALBUMIN/GLOB SERPL: 0.7 {RATIO} (ref 1.2–3.5)
ALP SERPL-CCNC: 94 U/L (ref 50–136)
ALT SERPL-CCNC: 26 U/L (ref 12–65)
ANION GAP SERPL CALC-SCNC: 7 MMOL/L (ref 7–16)
AST SERPL-CCNC: 27 U/L (ref 15–37)
ATRIAL RATE: 76 BPM
BILIRUB SERPL-MCNC: 0.6 MG/DL (ref 0.2–1.1)
BUN SERPL-MCNC: 12 MG/DL (ref 8–23)
CALCIUM SERPL-MCNC: 8.4 MG/DL (ref 8.3–10.4)
CALCULATED P AXIS, ECG09: 46 DEGREES
CALCULATED R AXIS, ECG10: -11 DEGREES
CALCULATED T AXIS, ECG11: 42 DEGREES
CHLORIDE SERPL-SCNC: 105 MMOL/L (ref 98–107)
CO2 SERPL-SCNC: 28 MMOL/L (ref 21–32)
CREAT SERPL-MCNC: 0.94 MG/DL (ref 0.6–1)
DIAGNOSIS, 93000: NORMAL
ERYTHROCYTE [DISTWIDTH] IN BLOOD BY AUTOMATED COUNT: 15.6 % (ref 11.9–14.6)
GLOBULIN SER CALC-MCNC: 3.9 G/DL (ref 2.3–3.5)
GLUCOSE SERPL-MCNC: 141 MG/DL (ref 65–100)
HCT VFR BLD AUTO: 37.5 % (ref 35.8–46.3)
HGB BLD-MCNC: 12.5 G/DL (ref 11.7–15.4)
LACTATE SERPL-SCNC: 0.7 MMOL/L (ref 0.4–2)
MCH RBC QN AUTO: 26.9 PG (ref 26.1–32.9)
MCHC RBC AUTO-ENTMCNC: 33.3 G/DL (ref 31.4–35)
MCV RBC AUTO: 80.8 FL (ref 79.6–97.8)
NRBC # BLD: 0 K/UL (ref 0–0.2)
P-R INTERVAL, ECG05: 150 MS
PLATELET # BLD AUTO: 286 K/UL (ref 150–450)
PMV BLD AUTO: 10.4 FL (ref 9.4–12.3)
POTASSIUM SERPL-SCNC: 2.9 MMOL/L (ref 3.5–5.1)
PROT SERPL-MCNC: 6.8 G/DL (ref 6.3–8.2)
Q-T INTERVAL, ECG07: 442 MS
QRS DURATION, ECG06: 76 MS
QTC CALCULATION (BEZET), ECG08: 497 MS
RBC # BLD AUTO: 4.64 M/UL (ref 4.05–5.2)
SODIUM SERPL-SCNC: 140 MMOL/L (ref 136–145)
TROPONIN-HIGH SENSITIVITY: 42.1 PG/ML (ref 0–14)
VENTRICULAR RATE, ECG03: 76 BPM
WBC # BLD AUTO: 13.5 K/UL (ref 4.3–11.1)

## 2020-11-28 PROCEDURE — 76700 US EXAM ABDOM COMPLETE: CPT

## 2020-11-28 PROCEDURE — 65660000000 HC RM CCU STEPDOWN

## 2020-11-28 PROCEDURE — 99233 SBSQ HOSP IP/OBS HIGH 50: CPT | Performed by: SURGERY

## 2020-11-28 PROCEDURE — 74011000250 HC RX REV CODE- 250: Performed by: SURGERY

## 2020-11-28 PROCEDURE — 84484 ASSAY OF TROPONIN QUANT: CPT

## 2020-11-28 PROCEDURE — 74011250637 HC RX REV CODE- 250/637: Performed by: SURGERY

## 2020-11-28 PROCEDURE — 80053 COMPREHEN METABOLIC PANEL: CPT

## 2020-11-28 PROCEDURE — 74011250636 HC RX REV CODE- 250/636: Performed by: SURGERY

## 2020-11-28 PROCEDURE — 85027 COMPLETE CBC AUTOMATED: CPT

## 2020-11-28 PROCEDURE — 83605 ASSAY OF LACTIC ACID: CPT

## 2020-11-28 PROCEDURE — 74011000258 HC RX REV CODE- 258: Performed by: SURGERY

## 2020-11-28 PROCEDURE — 93306 TTE W/DOPPLER COMPLETE: CPT

## 2020-11-28 PROCEDURE — 36415 COLL VENOUS BLD VENIPUNCTURE: CPT

## 2020-11-28 PROCEDURE — 99222 1ST HOSP IP/OBS MODERATE 55: CPT | Performed by: INTERNAL MEDICINE

## 2020-11-28 RX ORDER — SODIUM,POTASSIUM PHOSPHATES 280-250MG
1 POWDER IN PACKET (EA) ORAL ONCE
Status: COMPLETED | OUTPATIENT
Start: 2020-11-28 | End: 2020-11-28

## 2020-11-28 RX ORDER — DEXTROSE, SODIUM CHLORIDE, AND POTASSIUM CHLORIDE 5; .45; .3 G/100ML; G/100ML; G/100ML
INJECTION INTRAVENOUS CONTINUOUS
Status: DISCONTINUED | OUTPATIENT
Start: 2020-11-28 | End: 2020-12-01

## 2020-11-28 RX ORDER — ENALAPRILAT 1.25 MG/ML
1.25 INJECTION INTRAVENOUS
Status: DISCONTINUED | OUTPATIENT
Start: 2020-11-28 | End: 2020-12-07 | Stop reason: HOSPADM

## 2020-11-28 RX ADMIN — MORPHINE SULFATE 2 MG: 10 INJECTION INTRAVENOUS at 04:30

## 2020-11-28 RX ADMIN — ENOXAPARIN SODIUM 40 MG: 40 INJECTION SUBCUTANEOUS at 08:32

## 2020-11-28 RX ADMIN — FAMOTIDINE 20 MG: 10 INJECTION INTRAVENOUS at 20:43

## 2020-11-28 RX ADMIN — POTASSIUM & SODIUM PHOSPHATES POWDER PACK 280-160-250 MG 1 PACKET: 280-160-250 PACK at 08:36

## 2020-11-28 RX ADMIN — MORPHINE SULFATE 2 MG: 10 INJECTION INTRAVENOUS at 20:55

## 2020-11-28 RX ADMIN — DEXTROSE MONOHYDRATE, SODIUM CHLORIDE, AND POTASSIUM CHLORIDE: 50; 4.5; 2.98 INJECTION, SOLUTION INTRAVENOUS at 08:32

## 2020-11-28 RX ADMIN — PIPERACILLIN SODIUM AND TAZOBACTAM SODIUM 3.38 G: 3; .375 INJECTION, POWDER, LYOPHILIZED, FOR SOLUTION INTRAVENOUS at 23:55

## 2020-11-28 RX ADMIN — ACETAMINOPHEN ORAL SOLUTION 1000 MG: 325 SOLUTION ORAL at 15:25

## 2020-11-28 RX ADMIN — FAMOTIDINE 20 MG: 10 INJECTION INTRAVENOUS at 08:34

## 2020-11-28 RX ADMIN — DEXTROSE MONOHYDRATE, SODIUM CHLORIDE, AND POTASSIUM CHLORIDE: 50; 4.5; 2.98 INJECTION, SOLUTION INTRAVENOUS at 17:32

## 2020-11-28 RX ADMIN — PIPERACILLIN SODIUM AND TAZOBACTAM SODIUM 3.38 G: 3; .375 INJECTION, POWDER, LYOPHILIZED, FOR SOLUTION INTRAVENOUS at 16:18

## 2020-11-28 NOTE — ROUTINE PROCESS
Bedside and Verbal shift change report given to self (oncoming nurse) by Erik Dillard (offgoing nurse). Report included the following information SBAR, Kardex, Intake/Output and MAR. Fluids running at 100

## 2020-11-28 NOTE — ROUTINE PROCESS
Verbal bedside report given to Trinity Feeling, oncoming RN. Patient's situation, background, assessment and recommendations provided. Opportunity for questions provided. Oncoming RN assumed care of patient.

## 2020-11-28 NOTE — ROUTINE PROCESS
Bedside and Verbal shift change report given to maggie (oncoming nurse) by self (offgoing nurse). Report included the following information SBAR, Kardex, Intake/Output and MAR.

## 2020-11-28 NOTE — PROGRESS NOTES
H&P/Consult Note/Progress Note/Office Note:   Alba Lo  MRN: [id number]  :1935  Age:85 y.o.    HPI: Alba Lo is a 81 y.o. female with a long h/o gallstones and large mixed hiatal and paraesophageal hernia dating back at least to 2016   from studies at [facility] where she was followed and no surgery recommended. She came to the ER on 20 complaining of a 2-day history of pain that she states was primarily involving both of her flanks radiating to her epigastric region as well as the upper chest.    She specifically says she does not think the pain started in her epigastric region or her chest.    The pain was constant and progressive. Nothing in particular made it better or worse. She had associated nausea or vomiting. She was seen in an urgent treatment center and sent to the ER for further evaluation. While in the ER she is found to be significantly hypertensive with a blood pressure 175/99. Her troponin was markedly elevated at 56.7. WBC 17.3k;  LFTs and lipase were normal.  Lactic acid was also normal at 1.0. Urinalysis  Results for Dilan Vargas (MRN [id number]) as of 2020 19:29   Ref. Range 2020 17:33   Epithelial cells Latest Ref Range: 0 /hpf 0   Mucus Latest Ref Range: 0 /lpf 0   WBC Latest Ref Range: 0 /hpf 0-3   RBC Latest Ref Range: 0 /hpf 0   Bacteria Latest Ref Range: 0 /hpf 0   Crystals, urine Latest Ref Range: 0 /LPF 0   Casts Latest Ref Range: 0 /lpf 0   Other observations Latest Units:   RESULTS VERIFIED MANUALLY           She has a h/o GERD and prior hysterectomy.   Her large hiatal hernia has been known since at least  and has been imaged and evaluated multiple times at [address] (now [facility])   She was evaluated at [facility] with esophagogram and CT imaging and she reports that the surgeon told her that she was not a surgical candidate for the hiatal hernia  and that her operative risks related to hiatal hernia repair were too high to proceed. She is unsure of her remote history but reports she thinks she had ovarian cancer in the 1950s   and had a hysterectomy followed by chemotherapy and radiation treatment. 10/14/16 CT chest (at [facility])  Impression:  Stable small pulmonary nodule in the right middle lobe.  Indeterminate.  Consider CT chest surveillance 6 months. No thoracic adenopathy. Moderate hiatal hernia. Gallstones. 4/23/18 CT chest (at [facility])  Impression:  Stable 5 mm nodule in the right middle lobe for 2 years. Benign in nature. No thoracic adenopathy. Large hiatal hernia. Multiple gallstones        11/27/18 esophagram (at [facility])  Large hiatal hernia. This is a mixed type hiatal hernia with the GE junction above the diaphragm, however, there is also a paraesophageal component involving the gastric fundus and body. Free GE reflux is demonstrated into the distal  2/3rds of the esophagus, however, there is no associated esophageal ulceration, erosions or mass lesion. There is no esophageal stenosis. A 13 mm diameter barium tablet passed rapidly to the esophagus to the stomach without obstruction. .    IMPRESSION:  1. Large mixed sliding and paraesophageal type hiatal hernia with a portion of the gastric fundus and gastric body in the lower chest.  2.  Extensive gastroesophageal reflux without evidence of associated esophageal ulceration or mass. .             11/27/20 portable CXR  Hx: Epigastric pain    There is a retrocardiac hiatal hernia. There are no pulmonary  infiltrates. The heart size is normal.  The bony thorax is intact.       IMPRESSION: Large hiatal hernia, grossly stable compared to prior chest x-rays.       11/27/20 EKG  NSR; Prolonged QT   Abnormal ECG   when compared with ECG of 08-DEC-2017 09:14, QT has lengthened       11/27/20 CT abd/pelvis with IV contrast  Abdomen: The lung bases show a left-sided hiatal hernia with some adjacent  compressive atelectasis.  Air-filled loop of bowel seen between the liver and the  right diaphragm, with diaphragm slips seen along the posterior lateral right  liver, the gallbladder shows multiple gallstones and is somewhat distended with  some adjacent pericholecystic fluid, the pancreas, spleen, adrenals appear  normal. Right kidney appears normal, left kidney midpole shows a 8mm 43  Hounsfield unit minimally complex cystic lesion with the midpole laterally  showing an exophytic 7 mm 15 Hounsfield unit probable simple cyst. There is no  intraperitoneal free air, nor abnormal adenopathy seen. Patient has a high  riding cecum, the appendix is not seen.     Pelvis: There are multiple diverticula within an elongated sigmoid colon, the  bladder and pelvic sidewalls appear normal, the uterus is absent. There is no  intraperitoneal free air, ascites, nor abnormal adenopathy seen.     IMPRESSION:  1. 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 2. Gallstones and some pericholecystic fluid, if cholecystitis is suspected dedicated ultrasound should be considered. 3. There does appear to be a tiny common duct stone distally all below the duct does not appear dilated with a stone measuring 3 mm. 4. Simple cyst left kidney with probable complex cyst left kidney if imaging confirmation is desired nonemergent US should be considered. 5. Multiple diverticula without evidence of active diverticulitis.         11/28/20 US abd  FINDINGS:   LIVER: 13.7 cm. Normal echogenicity. No masses. BILE DUCTS: No intrahepatic bile duct dilatation. CBD diameter = 6 mm. GALLBLADDER: Mild gallbladder distention and multiple gallstones. No significant wall thickening. PANCREAS: Normal.  SPLEEN: Normal.     RIGHT KIDNEY: 10.1 cm. No mass or hydronephrosis. LEFT KIDNEY: 10.8 cm. 2 small renal cysts. No hydronephrosis. ABDOMINAL AORTA AND IVC: Normal in size. ASCITES: No free fluid.     IMPRESSION:   1. Distended gallbladder with multiple gallstones. ,  Concerning for early acute cholecystitis based on CT appearance. 2.  Small simple cysts in the left kidney.       Additonal hx:  11/28/20 echo pending; IV Abx for possible cholecystitis;  WBC improving; lactic acid normal; LFTs normal; HIDA Monday to look for GB filling        Past Medical History:   Diagnosis Date    Gastrointestinal disorder     acid reflux    Hypertension      Past Surgical History:   Procedure Laterality Date    HX GYN      cervical CA, hysterectomy    HX ORTHOPAEDIC      knee     Current Facility-Administered Medications   Medication Dose Route Frequency    dextrose 5% - 0.45% NaCl with KCl 40 mEq/L infusion   IntraVENous CONTINUOUS    enalaprilat (VASOTEC) injection 1.25 mg  1.25 mg IntraVENous Q6H PRN    piperacillin-tazobactam (ZOSYN) 3.375 g in 0.9% sodium chloride (MBP/ADV) 100 mL MBP  3.375 g IntraVENous Q8H    enoxaparin (LOVENOX) injection 40 mg  40 mg SubCUTAneous Q24H    ondansetron (ZOFRAN) injection 4 mg  4 mg IntraVENous Q4H PRN    famotidine (PF) (PEPCID) 20 mg in 0.9% sodium chloride 10 mL injection  20 mg IntraVENous Q12H    morphine 10 mg/ml injection 2 mg  2 mg IntraVENous Q1H PRN    acetaminophen (TYLENOL) solution 1,000 mg  1,000 mg Oral Q6H PRN     Patient has no known allergies. Social History     Socioeconomic History    Marital status:      Spouse name: Not on file    Number of children: Not on file    Years of education: Not on file    Highest education level: Not on file   Tobacco Use    Smoking status: Never Smoker    Smokeless tobacco: Never Used   Substance and Sexual Activity    Alcohol use: No    Drug use: No    Sexual activity: Never     Social History     Tobacco Use   Smoking Status Never Smoker   Smokeless Tobacco Never Used     History reviewed. No pertinent family history. ROS: The patient has no difficulty with chest pain or shortness of breath. No fever or chills.   Comprehensive review of systems was otherwise unremarkable except as noted above. Physical Exam:   Visit Vitals  BP (!) 150/59 (BP 1 Location: Left arm, BP Patient Position: At rest)   Pulse 62   Temp 98.4 °F (36.9 °C)   Resp 18   Ht 5' 3\" (1.6 m)   Wt 179 lb 12.8 oz (81.6 kg)   SpO2 94%   BMI 31.85 kg/m²     Vitals:    11/28/20 0045 11/28/20 0424 11/28/20 0732 11/28/20 1157   BP: 130/70 125/70 104/61 (!) 150/59   Pulse: 65 65 67 62   Resp: 16 16 18 18   Temp: 98.6 °F (37 °C) 98.6 °F (37 °C) 98.2 °F (36.8 °C) 98.4 °F (36.9 °C)   SpO2: 95% 93% 93% 94%   Weight:  179 lb 12.8 oz (81.6 kg)     Height:         11/28 0701 - 11/28 1900  In: -   Out: 200 [Urine:200]  11/26 1901 - 11/28 0700  In: 0   Out: 275 [Urine:275]    Constitutional: Alert, oriented, cooperative patient in no acute distress; appears stated age    Eyes:Sclera are clear. EOMs intact  ENMT: no external lesions gross hearing normal; no obvious neck masses, no ear or lip lesions, nares normal  CV: RRR. Normal perfusion  Resp: No JVD. Breathing is  non-labored; no audible wheezing. GI: soft and non-distended; minimal epigastric pain; no guarding or peritoneal signs; no sign RUQ tenderness      Musculoskeletal: unremarkable with normal function. No embolic signs or cyanosis.    Neuro:  Oriented; moves all 4; no focal deficits  Psychiatric: normal affect and mood, no memory impairment    Recent vitals (if inpt):  Patient Vitals for the past 24 hrs:   BP Temp Pulse Resp SpO2 Height Weight   11/28/20 1157 (!) 150/59 98.4 °F (36.9 °C) 62 18 94 %     11/28/20 0732 104/61 98.2 °F (36.8 °C) 67 18 93 %     11/28/20 0424 125/70 98.6 °F (37 °C) 65 16 93 %  179 lb 12.8 oz (81.6 kg)   11/28/20 0045 130/70 98.6 °F (37 °C) 65 16 95 %     11/27/20 2232   (!) 57       11/27/20 2216 (!) 161/78         11/27/20 2111 (!) 171/78 97.7 °F (36.5 °C) 65 16 95 % 5' 3\" (1.6 m) 173 lb 12.8 oz (78.8 kg)   11/27/20 2001 (!) 155/77  68 16 96 %         Labs:  Recent Labs     11/28/20  0550 11/27/20  1401   WBC 13.5* 17.3*   HGB 12.5 14.6    286    138   K 2.9* 3.1*    100   CO2 28 27   BUN 12 12   CREA 0.94 0.77   * 124*   TBILI 0.6 0.7   ALT 26 16   AP 94 112   LPSE  --  72*       Lab Results   Component Value Date/Time    WBC 13.5 (H) 11/28/2020 05:50 AM    HGB 12.5 11/28/2020 05:50 AM    PLATELET 704 86/14/9965 05:50 AM    Sodium 140 11/28/2020 05:50 AM    Potassium 2.9 (L) 11/28/2020 05:50 AM    Chloride 105 11/28/2020 05:50 AM    CO2 28 11/28/2020 05:50 AM    BUN 12 11/28/2020 05:50 AM    Creatinine 0.94 11/28/2020 05:50 AM    Glucose 141 (H) 11/28/2020 05:50 AM    Bilirubin, total 0.6 11/28/2020 05:50 AM    ALT (SGPT) 26 11/28/2020 05:50 AM    Alk. phosphatase 94 11/28/2020 05:50 AM    Lipase 72 (L) 11/27/2020 02:01 PM       CT Results  (Last 48 hours)               11/27/20 1639  CT ABD PELV W CONT Final result    Impression:  IMPRESSION:   1. 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 2. Gallstones and some pericholecystic fluid, if cholecystitis is suspected   dedicated ultrasound should be considered. 3. There does appear to be a tiny common duct stone distally all below the duct   does not appear dilated with a stone measuring 3 mm. 4. Simple cyst left kidney with probable complex cyst left kidney if imaging   confirmation is desired nonemergent ultrasound should be considered. 5. Multiple diverticula without evidence of active diverticulitis. Initial impression was called to Dr. Rahul Ren in the ER at 4:49 PM November 27, 2020. Narrative:  CT abdomen and pelvis done with IV contrast using ER protocol November 27, 2020       Reference exam: None       Indication: Abdomen pain with nausea vomiting       Technique: Radiation dose reduction techniques were used for this study using   one or more of the following: automated exposure control; adjustment of mA   and/or kV (according to patient size);  iterative reconstruction.  5 mm axial   images were taken through the abdomen and pelvis using IV contrast using ER   protocol. 100 cc Isovue 370 IV contrast was administered to better evaluate the   abdominal and pelvic contents. Abdomen: The lung bases show a left-sided hiatal hernia with some adjacent   compressive atelectasis. Air-filled loop of bowel seen between the liver and the   right diaphragm, with diaphragm slips seen along the posterior lateral right   liver, the gallbladder shows multiple gallstones and is somewhat distended with   some adjacent pericholecystic fluid, the pancreas, spleen, adrenals appear   normal. Right kidney appears normal, left kidney midpole shows a 8mm 43   Hounsfield unit minimally complex cystic lesion with the midpole laterally   showing an exophytic 7 mm 15 Hounsfield unit probable simple cyst. There is no   intraperitoneal free air, nor abnormal adenopathy seen. Patient has a high   riding cecum, the appendix is not seen. Pelvis: There are multiple diverticula within an elongated sigmoid colon, the   bladder and pelvic sidewalls appear normal, the uterus is absent. There is no   intraperitoneal free air, ascites, nor abnormal adenopathy seen. chest X-ray      I reviewed recent labs, recent radiologic studies, and pertinent records including other doctor notes if needed. I independently reviewed radiology images for studies I described above or studies I have ordered.    Admission date (for inpatients): 11/27/2020   * No surgery found *  * No surgery found *    ASSESSMENT/PLAN:  Problem List  Date Reviewed: 11/27/2020          Codes Class Noted    Large hiatal hernia (mixed sliding and paraesophageal dating back to 2016) ICD-10-CM: K44.9  ICD-9-CM: 553.3  11/27/2020        Non-intractable vomiting with nausea ICD-10-CM: R11.2  ICD-9-CM: 787.01  11/27/2020        Epigastric pain ICD-10-CM: R10.13  ICD-9-CM: 789.06  11/27/2020        Elevated troponin ICD-10-CM: R77.8  ICD-9-CM: 790.6  11/27/2020 Uncontrolled hypertension ICD-10-CM: I10  ICD-9-CM: 401.9  11/27/2020        Gallstones (since at least 2016) ICD-10-CM: K80.20  ICD-9-CM: 574.20  11/27/2020        Abnormal EKG ICD-10-CM: R94.31  ICD-9-CM: 794.31  11/27/2020        * (Principal) Chest pain ICD-10-CM: R07.9  ICD-9-CM: 786.50  11/27/2020        Hypokalemia ICD-10-CM: E87.6  ICD-9-CM: 276.8  11/27/2020            Principal Problem:    Chest pain (11/27/2020)    Active Problems:    Large hiatal hernia (mixed sliding and paraesophageal dating back to 2016) (11/27/2020)      Non-intractable vomiting with nausea (11/27/2020)      Epigastric pain (11/27/2020)      Elevated troponin (11/27/2020)      Uncontrolled hypertension (11/27/2020)      Gallstones (since at least 2016) (11/27/2020)      Abnormal EKG (11/27/2020)      Hypokalemia (11/27/2020)           Uncontrolled HTN, chest pain, abnormal EKG, and elevated troponin   Telemetry  Cardiology following  Echo pending  Troponin elevated x 2  IV BP control     Large Mixed Hiatal and Paraesophageal Hernia/age 85  She was not felt to be a surgical candidate for this problem when evaluated at [facility] in approximately 2016 and 2017  Her risks were felt to be prohibitive. This has not changed unless a life or death scenario develops as a result of the hernia. Bowel rest/NPO  IVF   Serial exams   It is encouraging that her lactic acid has remained normal throughout hospitalization   and that her chest pain has subsided substantially while in the emergency department.   Follow  Likely will have UGI done after some bowel rest      Gallstones  Abdominal US showed no GB wall thickening  HIDA planned Monday  Cont IV Abx until then    Hypokalemia  Replace K+, PO x 1 dose  Main with 40KCL    Bilat flank pain  Etiology unclear  No source for the pain on CT or US  Urinalysis normal  IV Zosyn x1 given by ER  Pain meds as needed ordered for symptomatic relief      Renal cysts  Ultrasound shows only small simple left renal cysts        I have personally performed a face-to-face diagnostic evaluation and management  service on this patient. I have independently seen the patient. I have independently obtained the above history from the patient/family. I have independently examined the patient with above findings. I have independently reviewed data/labs for this patient and developed the above plan of care (MDM). Signed: Tom Partida.  Horacio Case MD, FACS

## 2020-11-28 NOTE — PROGRESS NOTES
Mk Stevenson, NP notified that patient's potassium is 2.9 with dextrose 5% . 45% NaCl with KCl 20 mEq running. No new orders at this time. [id number]: Dr. Burgess Abdullahi called by phone and ordered to increase potassium fluid to 40 and add 20 mEq of oral potassium once.

## 2020-11-28 NOTE — CONSULTS
[address] [address]                 Primary Cardiologist: None    Primary Care Physician: Dr. Crayton Phalen    Admitting Physician: Dr. Isidro Morataya Physician: Dr. Sunni Dickerson:     Patient is a 81 y.o. AA female with PMHx of HTN, GERD, HLD, Ovarian CA and DM II who presented to the ED from Urgent Care with c/o intractable N/V and abdominal pain. She states that s/s started yesterday. States she ate an egg with cheese on a hamburger bun for breakfast. By 0900 she was having bilateral flank pain that radiated around to her anterior abdomen. She states this is the typical pain she gets with her \"reflux. \" She denies any associated HA, dizziness, syncope, palpitations, SOB/CONTRERAS. States she tried to put the Lidoderm patches on her abdomen to help with the pain but this really didn't help much. Took her BP meds with some water but states by 1100 she started vomiting. States she vomited up all her pills. She then noted the discomfort hd moved into her chest. States she continued to have pain all day. Her daughter brought her a plate of Thanksgiving food over but she was unable to eat anything. States she tried to drink water but would almost immediately vomit up whatever she had drank. States she knows what foods she is supposed to avoid but sometimes forgets. States she went to Urgent Care today. Did not take her AM BP meds. BP there 220/120. She was ultimately transferred to [facility] for further evaluation and Surgical consultation. In the ED: Initial /99, given IV Morphine for pain and BP improved to 155/77. She was seen by Surgery for CT findings of possible gastric volvulus with known large hiatal hernia. In the ED her initial hsTrop was 56.7 and Cardiology was consulted for evaluation. Pt denies prior known heart disease. States that she is \"pretty healthy for my age. \" Denies prior cardiac w/u, MI, or known family history of CAD, MI or SCD. Had recent appt with PCP.  Told that her BS was \"borderline\" too high and was told to cut down on sodas and sweet tea. Has been on/off cholesterol meds but not on any meds for several years -- states watching diet. No ETOH, non-smoker. Last BM was this AM.     Past Medical History:   Diagnosis Date    Gastrointestinal disorder     acid reflux    Hypertension       Past Surgical History:   Procedure Laterality Date    HX GYN      cervical CA, hysterectomy    HX ORTHOPAEDIC      knee      No Known Allergies  Social History     Tobacco Use    Smoking status: Never Smoker    Smokeless tobacco: Never Used   Substance Use Topics    Alcohol use: No      FH: History reviewed. No pertinent family history. Review of Systems  General: no recent weight change, no weakness, no fatigue, no fever or chills, no diaphoresis  Skin: no rashes, lumps, wounds, sores or other skin changes  HEENT: no headache, dizziness, lightheadedness, vision changes, hearing changes, tinnitus, vertigo, sinus pressure/pain, bleeding gums, sore throat, or hoarseness  Neck: no swollen glands, goiter, pain or stiffness  Respiratory: no cough, no congestion, no sputum, no hemoptysis, no dyspnea, no wheezing  Cardiovascular: + as per HPI  Gastrointestinal: +reflux, no constipation, diarrhea, liver problems, GI bleeding, ++N/V, ++abdominal pain  Urinary: no frequency, urgency, hematuria, burning/pain with urination, + bilat flank pain, no polyuria, nocturia, or difficulty urinating  Peripheral Vascular: no claudication, leg cramps, prior DVTs, swelling of calves, legs, or feet, color change, or swelling with redness or tenderness  Musculoskeletal: no muscle or joint pain/stiffness, joint swelling, erythema of joints, or back pain  Psychiatric: no increased depression, anxiety or excessive stress  Neurological: no sensory or motor loss, seizures, syncope, tremors, numbness, tingling, no changes in mood, attention, or speech, no changes in orientation, memory, insight, or judgment.    Hematologic: no anemia, easy bruising or bleeding  Endocrine: no thyroid problems, no heat or cold intolerance, excessive sweating, polyuria, polydipsia, +diabetes. Objective:       Visit Vitals  BP (!) 155/77 (BP 1 Location: Left arm, BP Patient Position: At rest)   Pulse 68   Temp 98.4 °F (36.9 °C)   Resp 16   Ht 5' 3\" (1.6 m)   Wt 81.6 kg (180 lb)   SpO2 96%   BMI 31.89 kg/m²       No intake/output data recorded. No intake/output data recorded. Physical Exam:  General: well developed, well nourished, NAD, resting flat on stretcher, occasionally winces in pain  HEENT: pupils equal and round, no abnormalities noted, sclera clear, EOMs intact  Neck: supple, no JVD, no carotid bruits  Heart: S1S2 with RRR without murmurs, rubs or gallops  Lungs: clear throughout auscultation bilaterally, mildly shallow effort on RA, no wheezing or rales  Abd: soft, diffusely tender to palpation, nondistended, no rebound or guarding  Ext: warm, no edema, calves supple/nontender, pulses 2+ bilaterally  Skin: warm and dry, intact  Psychiatric: appropriate mood and affect, cooperative  Neurologic: A&O X 3, moves all 4 equally, CNs intact      ECG: SR 76 w/o acute ST changes    ECHO: ordered and pending    CXR: Stable hiatal hernia, no acute pulmonary findings    CTAP  IMPRESSION:  1. 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 2. Gallstones and some pericholecystic fluid, if cholecystitis is suspected dedicated ultrasound should be considered. 3. There does appear to be a tiny common duct stone distally all below the duct does not appear dilated with a stone measuring 3 mm. 4. Simple cyst left kidney with probable complex cyst left kidney if imaging confirmation is desired nonemergent US should be considered.   5. Multiple diverticula without evidence of active diverticulitis.       Data Review:      Recent Results (from the past 24 hour(s))   EKG, 12 LEAD, INITIAL    Collection Time: 11/27/20  1:55 PM   Result Value Ref Range    Ventricular Rate 76 BPM    Atrial Rate 76 BPM    P-R Interval 150 ms    QRS Duration 76 ms    Q-T Interval 442 ms    QTC Calculation (Bezet) 497 ms    Calculated P Axis 46 degrees    Calculated R Axis -11 degrees    Calculated T Axis 42 degrees    Diagnosis       Normal sinus rhythm  Prolonged QT  Abnormal ECG  When compared with ECG of 08-DEC-2017 09:14,  QT has lengthened     CBC WITH AUTOMATED DIFF    Collection Time: 11/27/20  2:01 PM   Result Value Ref Range    WBC 17.3 (H) 4.3 - 11.1 K/uL    RBC 5.54 (H) 4.05 - 5.2 M/uL    HGB 14.6 11.7 - 15.4 g/dL    HCT 44.6 35.8 - 46.3 %    MCV 80.5 79.6 - 97.8 FL    MCH 26.4 26.1 - 32.9 PG    MCHC 32.7 31.4 - 35.0 g/dL    RDW 15.3 (H) 11.9 - 14.6 %    PLATELET 487 297 - 773 K/uL    MPV 10.8 9.4 - 12.3 FL    ABSOLUTE NRBC 0.00 0.0 - 0.2 K/uL    DF AUTOMATED      NEUTROPHILS 86 (H) 43 - 78 %    LYMPHOCYTES 8 (L) 13 - 44 %    MONOCYTES 6 4.0 - 12.0 %    EOSINOPHILS 0 (L) 0.5 - 7.8 %    BASOPHILS 0 0.0 - 2.0 %    IMMATURE GRANULOCYTES 0 0.0 - 5.0 %    ABS. NEUTROPHILS 14.8 (H) 1.7 - 8.2 K/UL    ABS. LYMPHOCYTES 1.4 0.5 - 4.6 K/UL    ABS. MONOCYTES 1.0 0.1 - 1.3 K/UL    ABS. EOSINOPHILS 0.0 0.0 - 0.8 K/UL    ABS. BASOPHILS 0.0 0.0 - 0.2 K/UL    ABS. IMM. GRANS. 0.1 0.0 - 0.5 K/UL   METABOLIC PANEL, COMPREHENSIVE    Collection Time: 11/27/20  2:01 PM   Result Value Ref Range    Sodium 138 136 - 145 mmol/L    Potassium 3.1 (L) 3.5 - 5.1 mmol/L    Chloride 100 98 - 107 mmol/L    CO2 27 21 - 32 mmol/L    Anion gap 11 7 - 16 mmol/L    Glucose 124 (H) 65 - 100 mg/dL    BUN 12 8 - 23 MG/DL    Creatinine 0.77 0.6 - 1.0 MG/DL    GFR est AA >60 >60 ml/min/1.73m2    GFR est non-AA >60 >60 ml/min/1.73m2    Calcium 9.0 8.3 - 10.4 MG/DL    Bilirubin, total 0.7 0.2 - 1.1 MG/DL    ALT (SGPT) 16 12 - 65 U/L    AST (SGOT) 28 15 - 37 U/L    Alk.  phosphatase 112 50 - 136 U/L    Protein, total 8.5 (H) 6.3 - 8.2 g/dL    Albumin 3.6 3.2 - 4.6 g/dL    Globulin 4.9 (H) 2.3 - 3.5 g/dL A-G Ratio 0.7 (L) 1.2 - 3.5     LIPASE    Collection Time: 11/27/20  2:01 PM   Result Value Ref Range    Lipase 72 (L) 73 - 393 U/L   PROCALCITONIN    Collection Time: 11/27/20  2:01 PM   Result Value Ref Range    Procalcitonin <0.05 ng/mL   TROPONIN-HIGH SENSITIVITY    Collection Time: 11/27/20  5:20 PM   Result Value Ref Range    Troponin-High Sensitivity 56.7 (H) 0 - 14 pg/mL   LACTIC ACID    Collection Time: 11/27/20  5:20 PM   Result Value Ref Range    Lactic acid 1.0 0.4 - 2.0 MMOL/L   URINE MICROSCOPIC    Collection Time: 11/27/20  5:33 PM   Result Value Ref Range    WBC 0-3 0 /hpf    RBC 0 0 /hpf    Epithelial cells 0 0 /hpf    Bacteria 0 0 /hpf    Casts 0 0 /lpf    Crystals, urine 0 0 /LPF    Mucus 0 0 /lpf    Other observations RESULTS VERIFIED MANUALLY           Assessment/Plan:   Principal Problem:    Chest pain -- monitor on tele, Pt denies \"CP\" -- had flank and abdominal pain that radiated up to chest, will trend Blossom, would not initiate IV heparin right now with possible need for surgery and no current CP or EKG changes, re-eval for recurrent CP, will check ECHO in AM, need to control BP    Active Problems:    Large hiatal hernia (mixed sliding and paraesophageal dating back to 2016) -- per Surgery      Non-intractable vomiting with nausea -- on strict bowel rest -- per Surgery      Epigastric pain -- per Surgery      Elevated troponin -- likely demand 2/2 acute HTN, no acute EKG changes, will trend, check ECHO      Uncontrolled hypertension  -- Pt basically off meds x 2 days, could use IV BB, ACEi or Hydralazine for BP control while on bowel rest, apparently had a cough with ACEi and was recently changes to ARB but could try a dose and see how she tolerates      Gallstones (since at least 2016) -- per Surgery      Abnormal EKG -- reviewed, appears questionable EKG changes on EMS EKG -- repeat in ED ok      Dyslipidemia -- states has been on/off meds -- not on any meds currently, recent lipid panel stable with , HDl 51, ,       Hypokalemia -- addressed by Surgery, likely due to N/V, f/u labs          LISA Brenner  11/27/2020  8:24 PM

## 2020-11-28 NOTE — PROGRESS NOTES
TRANSFER - IN REPORT:    Verbal report received from Elizabeth Abbott RN on [facility] Slot being received from ED  for routine progression of care. Report consisted of patients Situation, Background, Assessment and Recommendations(SBAR). Information from the following report(s) SBAR, Kardex, [facility] and Recent Results was reviewed. Opportunity for questions and clarification was provided. Assessment completed upon patients arrival to unit and care assumed. Patient received to room 317. Patient connected to monitor and assessment completed. Plan of care reviewed. Patient oriented to room and call light. Patient aware to use call light to communicate any chest pain or needs. Admission skin assessment completed with second RN and reveals the following: Buttocks & heels intact. No redness noted. Skin dry and flaky.

## 2020-11-29 ENCOUNTER — APPOINTMENT (OUTPATIENT)
Dept: GENERAL RADIOLOGY | Age: 85
DRG: 418 | End: 2020-11-29
Attending: SURGERY
Payer: MEDICARE

## 2020-11-29 LAB
ALBUMIN SERPL-MCNC: 2.3 G/DL (ref 3.2–4.6)
ALBUMIN/GLOB SERPL: 0.6 {RATIO} (ref 1.2–3.5)
ALP SERPL-CCNC: 93 U/L (ref 50–136)
ALT SERPL-CCNC: 37 U/L (ref 12–65)
ANION GAP SERPL CALC-SCNC: 5 MMOL/L (ref 7–16)
AST SERPL-CCNC: 45 U/L (ref 15–37)
BILIRUB SERPL-MCNC: 0.7 MG/DL (ref 0.2–1.1)
BUN SERPL-MCNC: 11 MG/DL (ref 8–23)
CALCIUM SERPL-MCNC: 8.1 MG/DL (ref 8.3–10.4)
CHLORIDE SERPL-SCNC: 109 MMOL/L (ref 98–107)
CO2 SERPL-SCNC: 27 MMOL/L (ref 21–32)
CREAT SERPL-MCNC: 0.89 MG/DL (ref 0.6–1)
ERYTHROCYTE [DISTWIDTH] IN BLOOD BY AUTOMATED COUNT: 15.6 % (ref 11.9–14.6)
GLOBULIN SER CALC-MCNC: 3.8 G/DL (ref 2.3–3.5)
GLUCOSE SERPL-MCNC: 126 MG/DL (ref 65–100)
HCT VFR BLD AUTO: 34.6 % (ref 35.8–46.3)
HGB BLD-MCNC: 11.3 G/DL (ref 11.7–15.4)
LACTATE SERPL-SCNC: 1.2 MMOL/L (ref 0.4–2)
MCH RBC QN AUTO: 27 PG (ref 26.1–32.9)
MCHC RBC AUTO-ENTMCNC: 32.7 G/DL (ref 31.4–35)
MCV RBC AUTO: 82.6 FL (ref 79.6–97.8)
NRBC # BLD: 0 K/UL (ref 0–0.2)
PLATELET # BLD AUTO: 252 K/UL (ref 150–450)
PMV BLD AUTO: 10.6 FL (ref 9.4–12.3)
POTASSIUM SERPL-SCNC: 3.6 MMOL/L (ref 3.5–5.1)
PROT SERPL-MCNC: 6.1 G/DL (ref 6.3–8.2)
RBC # BLD AUTO: 4.19 M/UL (ref 4.05–5.2)
SODIUM SERPL-SCNC: 141 MMOL/L (ref 138–145)
WBC # BLD AUTO: 11.4 K/UL (ref 4.3–11.1)

## 2020-11-29 PROCEDURE — 65660000000 HC RM CCU STEPDOWN

## 2020-11-29 PROCEDURE — 36415 COLL VENOUS BLD VENIPUNCTURE: CPT

## 2020-11-29 PROCEDURE — 74011250636 HC RX REV CODE- 250/636: Performed by: SURGERY

## 2020-11-29 PROCEDURE — 74011000258 HC RX REV CODE- 258: Performed by: SURGERY

## 2020-11-29 PROCEDURE — 83605 ASSAY OF LACTIC ACID: CPT

## 2020-11-29 PROCEDURE — 99233 SBSQ HOSP IP/OBS HIGH 50: CPT | Performed by: INTERNAL MEDICINE

## 2020-11-29 PROCEDURE — 80053 COMPREHEN METABOLIC PANEL: CPT

## 2020-11-29 PROCEDURE — 2709999900 HC NON-CHARGEABLE SUPPLY

## 2020-11-29 PROCEDURE — 85027 COMPLETE CBC AUTOMATED: CPT

## 2020-11-29 PROCEDURE — 74011000250 HC RX REV CODE- 250: Performed by: SURGERY

## 2020-11-29 PROCEDURE — 99232 SBSQ HOSP IP/OBS MODERATE 35: CPT | Performed by: SURGERY

## 2020-11-29 RX ADMIN — MORPHINE SULFATE 2 MG: 10 INJECTION INTRAVENOUS at 02:24

## 2020-11-29 RX ADMIN — MORPHINE SULFATE 2 MG: 10 INJECTION INTRAVENOUS at 20:36

## 2020-11-29 RX ADMIN — FAMOTIDINE 20 MG: 10 INJECTION INTRAVENOUS at 08:19

## 2020-11-29 RX ADMIN — ENOXAPARIN SODIUM 40 MG: 40 INJECTION SUBCUTANEOUS at 08:24

## 2020-11-29 RX ADMIN — DEXTROSE MONOHYDRATE, SODIUM CHLORIDE, AND POTASSIUM CHLORIDE: 50; 4.5; 2.98 INJECTION, SOLUTION INTRAVENOUS at 02:26

## 2020-11-29 RX ADMIN — FAMOTIDINE 20 MG: 10 INJECTION INTRAVENOUS at 20:35

## 2020-11-29 RX ADMIN — MORPHINE SULFATE 2 MG: 10 INJECTION INTRAVENOUS at 08:17

## 2020-11-29 RX ADMIN — PIPERACILLIN SODIUM AND TAZOBACTAM SODIUM 3.38 G: 3; .375 INJECTION, POWDER, LYOPHILIZED, FOR SOLUTION INTRAVENOUS at 16:12

## 2020-11-29 RX ADMIN — ONDANSETRON 4 MG: 2 INJECTION INTRAMUSCULAR; INTRAVENOUS at 20:35

## 2020-11-29 RX ADMIN — DEXTROSE MONOHYDRATE, SODIUM CHLORIDE, AND POTASSIUM CHLORIDE: 50; 4.5; 2.98 INJECTION, SOLUTION INTRAVENOUS at 23:29

## 2020-11-29 RX ADMIN — PIPERACILLIN SODIUM AND TAZOBACTAM SODIUM 3.38 G: 3; .375 INJECTION, POWDER, LYOPHILIZED, FOR SOLUTION INTRAVENOUS at 08:19

## 2020-11-29 RX ADMIN — DEXTROSE MONOHYDRATE, SODIUM CHLORIDE, AND POTASSIUM CHLORIDE: 50; 4.5; 2.98 INJECTION, SOLUTION INTRAVENOUS at 13:11

## 2020-11-29 RX ADMIN — MORPHINE SULFATE 2 MG: 10 INJECTION INTRAVENOUS at 12:03

## 2020-11-29 NOTE — ROUTINE PROCESS
Bedside and Verbal shift change report given to self (oncoming nurse) by Tina Stewart (offgoing nurse). Report included the following information SBAR, Kardex, Intake/Output and MAR.

## 2020-11-29 NOTE — PROGRESS NOTES
[facility] CARDIOLOG[facility] PROGRESS NOTE           11/29/2020 9:00 AM    Admit Date: 11/27/2020      Subjective: The patient does not report SOB, CP or palpitations. Abdominal pain present. ROS:  Constitutional:   Negative for fevers and unexplained weight loss. Eyes:   Negative for visual disturbance. ENT:   Negative for significant hearing loss and tinnitus. Respiratory:   Negative for hemoptysis. Cardiovascular:   Negative except as noted in HPI. Gastrointestinal:   Negative for melena. Genitourinary:   Negative for hematuria, renal stones. Integumentary:   Negative for rash or non-healing wounds  Hematologic/Lymphatic:   Negative for excessive bleeding hx or clotting disorder. Musculoskeletal:  Negative for active, unexplained/severe joint pain. Neurological:   Negative for stroke. Behavioral/Psych:   Negative for suicidal ideations. Endocrine:   Negative for uncontrolled diabetic symptoms including polyuria, polydipsia and poor wound healing. Objective:      Vitals:    11/28/20 1541 11/28/20 2054 11/29/20 0034 11/29/20 0437   BP: 137/67 (!) 165/62 (!) 150/66 (!) 146/76   Pulse: 67 64 62 67   Resp: 18 20 16 20   Temp: 98.7 °F (37.1 °C) 97.9 °F (36.6 °C) 97.7 °F (36.5 °C) 98.3 °F (36.8 °C)   SpO2: 94% 96% 96% 97%   Weight:    183 lb 6.4 oz (83.2 kg)   Height:           Physical Exam:  General-No Acute Distress  Neck- supple, no JVD  CV- regular rate and rhythm no RG  Lung- clear bilaterally  Abd- soft, nontender, nondistended  Ext- no edema bilaterally.   Skin- warm and dry    Data Review:   Recent Labs     11/29/20  0331 11/28/20  0550    140   K 3.6 2.9*   BUN 11 12   CREA 0.89 0.94   * 141*   WBC 11.4* 13.5*   HGB 11.3* 12.5   HCT 34.6* 37.5    286       Assessment/Plan:     HTN  - ECHO report: EF 60-65% and normal diastolic function  - troponin elevation (Tn 57 -> 42) in the setting of severe HTN (resolved) and possible sepsis (patient on Zosyn with leukocytosis); EKG w/o ischemic changes and ECHO w/o RWMAs  - enalaprilat prn until able to take PO (can resume home medications at that point)    Gastric volvulus  Large hiatal hernia  - patient having demonstrable abdominal pain that radiates to the chest   - management per surgery: currently on bowel rest/NPO and IVFs    Gallstones  - management per surgery  - patient to have HIDA scan on Monday    Will sign off. Please call with questions.      Portia Ellis MD

## 2020-11-29 NOTE — PROGRESS NOTES
H&P/Consult Note/Progress Note/Office Note:   Paras Castellanos  MRN: [id number]  :1935  Age:85 y.o.    HPI: Paras Castellanos is a 81 y.o. female with a long h/o gallstones and large mixed hiatal and paraesophageal hernia dating back at least to 2016   from studies at [facility] where she was followed and no surgery recommended. She came to the ER on 20 complaining of a 2-day history of pain that she states was primarily involving both of her flanks radiating to her epigastric region as well as the upper chest.    She specifically says she does not think the pain started in her epigastric region or her chest.    The pain was constant and progressive. Nothing in particular made it better or worse. She had associated nausea or vomiting. She was seen in an urgent treatment center and sent to the ER for further evaluation. While in the ER she is found to be significantly hypertensive with a blood pressure 175/99. Her troponin was markedly elevated at 56.7. WBC 17.3k;  LFTs and lipase were normal.  Lactic acid was also normal at 1.0. Urinalysis  Results for Suha Carrillo (MRN [id number]) as of 2020 19:29   Ref. Range 2020 17:33   Epithelial cells Latest Ref Range: 0 /hpf 0   Mucus Latest Ref Range: 0 /lpf 0   WBC Latest Ref Range: 0 /hpf 0-3   RBC Latest Ref Range: 0 /hpf 0   Bacteria Latest Ref Range: 0 /hpf 0   Crystals, urine Latest Ref Range: 0 /LPF 0   Casts Latest Ref Range: 0 /lpf 0   Other observations Latest Units:   RESULTS VERIFIED MANUALLY           She has a h/o GERD and prior hysterectomy.   Her large hiatal hernia has been known since at least  and has been imaged and evaluated multiple times at [facility] (now [facility])   She was evaluated at [facility] with esophagogram and CT imaging and she reports that the surgeon told her that she was not a surgical candidate for the hiatal hernia  and that her operative risks related to hiatal hernia repair were too high to proceed. She is unsure of her remote history but reports she thinks she had ovarian cancer in the 1950s   and had a hysterectomy followed by chemotherapy and radiation treatment. 10/14/16 CT chest (at [facility])  Impression:  Stable small pulmonary nodule in the right middle lobe.  Indeterminate.  Consider CT chest surveillance 6 months. No thoracic adenopathy. Moderate hiatal hernia. Gallstones. 4/23/18 CT chest (at [facility])  Impression:  Stable 5 mm nodule in the right middle lobe for 2 years. Benign in nature. No thoracic adenopathy. Large hiatal hernia. Multiple gallstones        11/27/18 esophagram (at [facility])  Large hiatal hernia. This is a mixed type hiatal hernia with the GE junction above the diaphragm, however, there is also a paraesophageal component involving the gastric fundus and body. Free GE reflux is demonstrated into the distal  2/3rds of the esophagus, however, there is no associated esophageal ulceration, erosions or mass lesion. There is no esophageal stenosis. A 13 mm diameter barium tablet passed rapidly to the esophagus to the stomach without obstruction. .    IMPRESSION:  1. Large mixed sliding and paraesophageal type hiatal hernia with a portion of the gastric fundus and gastric body in the lower chest.  2.  Extensive gastroesophageal reflux without evidence of associated esophageal ulceration or mass. .         11/27/20 portable CXR  IMPRESSION: Large hiatal hernia, grossly stable compared to prior chest x-rays.       11/27/20 EKG  NSR; Prolonged QT   Abnormal ECG   when compared with ECG of 08-DEC-2017 09:14, QT has lengthened       11/27/20 CT abd/pelvis with IV contrast  Abdomen: The lung bases show a left-sided hiatal hernia with some adjacent  compressive atelectasis.  Air-filled loop of bowel seen between the liver and the  right diaphragm, with diaphragm slips seen along the posterior lateral right  liver, the gallbladder shows multiple gallstones and is somewhat distended with  some adjacent pericholecystic fluid, the pancreas, spleen, adrenals appear  normal. Right kidney appears normal, left kidney midpole shows a 8mm 43  Hounsfield unit minimally complex cystic lesion with the midpole laterally  showing an exophytic 7 mm 15 Hounsfield unit probable simple cyst. There is no  intraperitoneal free air, nor abnormal adenopathy seen. Patient has a high  riding cecum, the appendix is not seen.     Pelvis: There are multiple diverticula within an elongated sigmoid colon, the  bladder and pelvic sidewalls appear normal, the uterus is absent. There is no  intraperitoneal free air, ascites, nor abnormal adenopathy seen.     IMPRESSION:  1. 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 2. Gallstones and some pericholecystic fluid, if cholecystitis is suspected dedicated ultrasound should be considered. 3. There does appear to be a tiny common duct stone distally all below the duct does not appear dilated with a stone measuring 3 mm. 4. Simple cyst left kidney with probable complex cyst left kidney if imaging confirmation is desired nonemergent US should be considered. 5. Multiple diverticula without evidence of active diverticulitis.       11/28/20 echo  EF estimated 60-65%. No regional wall motion abnormalities. Wall thickness was normal. Left ventricular diastolic function parameters were normal. Avg E/e': 9.28.     RIGHT VENTRICLE: The size was normal. Systolic function was normal.  Estimated peak pressure was in the range of 25-30 mmHg. LEFT ATRIUM: Size was normal.  RIGHT ATRIUM: Size was normal.    SUMMARY:  -  Left ventricle: Systolic function was normal. EF 60-65%. No regional wall motion abnormalities. -  Inferior vena cava, hepatic veins: The respirophasic change in diameter was more than 50%. -  Tricuspid valve: There was mild regurgitation. 11/28/20 US abd  FINDINGS:   LIVER: 13.7 cm. Normal echogenicity.   No masses. BILE DUCTS: No intrahepatic bile duct dilatation. CBD diameter = 6 mm. GALLBLADDER: Mild gallbladder distention and multiple gallstones. No significant wall thickening. PANCREAS: Normal.  SPLEEN: Normal.     RIGHT KIDNEY: 10.1 cm. No mass or hydronephrosis. LEFT KIDNEY: 10.8 cm. 2 small renal cysts. No hydronephrosis. ABDOMINAL AORTA AND IVC: Normal in size. ASCITES: No free fluid.     IMPRESSION:   1. Distended gallbladder with multiple gallstones. ,  Concerning for early acute cholecystitis based on CT appearance. 2.  Small simple cysts in the left kidney.       Additonal hx:  11/28/20 echo pending; IV Abx for possible cholecystitis;  WBC improving; lactic acid normal; LFTs normal; HIDA Monday to look for GB filling  11/29/20 abd pain persists; comes and goes;no SOB; no CP;  lactic acid normal; wbc coming down; UGI today; HIDA in am; cardiology thinks troponin elevation related to HTN/demand ischemia                Past Medical History:   Diagnosis Date    Gastrointestinal disorder     acid reflux    Hypertension      Past Surgical History:   Procedure Laterality Date    HX GYN      cervical CA, hysterectomy    HX ORTHOPAEDIC      knee     Current Facility-Administered Medications   Medication Dose Route Frequency    dextrose 5% - 0.45% NaCl with KCl 40 mEq/L infusion   IntraVENous CONTINUOUS    enalaprilat (VASOTEC) injection 1.25 mg  1.25 mg IntraVENous Q6H PRN    piperacillin-tazobactam (ZOSYN) 3.375 g in 0.9% sodium chloride (MBP/ADV) 100 mL MBP  3.375 g IntraVENous Q8H    enoxaparin (LOVENOX) injection 40 mg  40 mg SubCUTAneous Q24H    ondansetron (ZOFRAN) injection 4 mg  4 mg IntraVENous Q4H PRN    famotidine (PF) (PEPCID) 20 mg in 0.9% sodium chloride 10 mL injection  20 mg IntraVENous Q12H    morphine 10 mg/ml injection 2 mg  2 mg IntraVENous Q1H PRN    acetaminophen (TYLENOL) solution 1,000 mg  1,000 mg Oral Q6H PRN     Patient has no known allergies.   Social History Socioeconomic History    Marital status:      Spouse name: Not on file    Number of children: Not on file    Years of education: Not on file    Highest education level: Not on file   Tobacco Use    Smoking status: Never Smoker    Smokeless tobacco: Never Used   Substance and Sexual Activity    Alcohol use: No    Drug use: No    Sexual activity: Never     Social History     Tobacco Use   Smoking Status Never Smoker   Smokeless Tobacco Never Used     History reviewed. No pertinent family history. ROS: The patient has no difficulty with chest pain or shortness of breath. No fever or chills. Comprehensive review of systems was otherwise unremarkable except as noted above. Physical Exam:   Visit Vitals  BP (!) 157/73 (BP 1 Location: Left arm, BP Patient Position: At rest)   Pulse 67   Temp 98 °F (36.7 °C)   Resp 18   Ht 5' 3\" (1.6 m)   Wt 183 lb 6.4 oz (83.2 kg)   SpO2 94%   BMI 32.49 kg/m²     Vitals:    11/28/20 2054 11/29/20 0034 11/29/20 0437 11/29/20 0800   BP: (!) 165/62 (!) 150/66 (!) 146/76 (!) 157/73   Pulse: 64 62 67 67   Resp: 20 16 20 18   Temp: 97.9 °F (36.6 °C) 97.7 °F (36.5 °C) 98.3 °F (36.8 °C) 98 °F (36.7 °C)   SpO2: 96% 96% 97% 94%   Weight:   183 lb 6.4 oz (83.2 kg)    Height:         11/29 0701 - 11/29 1900  In: -   Out: 475 [Urine:475]  11/27 1901 - 11/29 0700  In: 2420 [I.V.:2420]  Out: 475 [Urine:475]    Constitutional: Alert, oriented, cooperative patient in no acute distress; appears stated age    Eyes:Sclera are clear. EOMs intact  ENMT: no external lesions gross hearing normal; no obvious neck masses, no ear or lip lesions, nares normal  CV: RRR. Normal perfusion  Resp: No JVD. Breathing is  non-labored; no audible wheezing. GI: soft and non-distended; minimal periumbilical and epigastric pain; no guarding or peritoneal signs; no sign RUQ tenderness      Musculoskeletal: unremarkable with normal function. No embolic signs or cyanosis.    Neuro:  Oriented; moves all 4; no focal deficits  Psychiatric: normal affect and mood, no memory impairment    Recent vitals (if inpt):  Patient Vitals for the past 24 hrs:   BP Temp Pulse Resp SpO2 Weight   11/29/20 0800 (!) 157/73 98 °F (36.7 °C) 67 18 94 %    11/29/20 0437 (!) 146/76 98.3 °F (36.8 °C) 67 20 97 % 183 lb 6.4 oz (83.2 kg)   11/29/20 0034 (!) 150/66 97.7 °F (36.5 °C) 62 16 96 %    11/28/20 2054 (!) 165/62 97.9 °F (36.6 °C) 64 20 96 %    11/28/20 1541 137/67 98.7 °F (37.1 °C) 67 18 94 %    11/28/20 1157 (!) 150/59 98.4 °F (36.9 °C) 62 18 94 %        Labs:  Recent Labs     11/29/20  0331  11/27/20  1401   WBC 11.4*   < > 17.3*   HGB 11.3*   < > 14.6      < > 286      < > 138   K 3.6   < > 3.1*   *   < > 100   CO2 27   < > 27   BUN 11   < > 12   CREA 0.89   < > 0.77   *   < > 124*   TBILI 0.7   < > 0.7   ALT 37   < > 16   AP 93   < > 112   LPSE  --   --  72*    < > = values in this interval not displayed. Lab Results   Component Value Date/Time    WBC 11.4 (H) 11/29/2020 03:31 AM    HGB 11.3 (L) 11/29/2020 03:31 AM    PLATELET 672 71/58/5212 03:31 AM    Sodium 141 11/29/2020 03:31 AM    Potassium 3.6 11/29/2020 03:31 AM    Chloride 109 (H) 11/29/2020 03:31 AM    CO2 27 11/29/2020 03:31 AM    BUN 11 11/29/2020 03:31 AM    Creatinine 0.89 11/29/2020 03:31 AM    Glucose 126 (H) 11/29/2020 03:31 AM    Bilirubin, total 0.7 11/29/2020 03:31 AM    ALT (SGPT) 37 11/29/2020 03:31 AM    Alk. phosphatase 93 11/29/2020 03:31 AM    Lipase 72 (L) 11/27/2020 02:01 PM       CT Results  (Last 48 hours)               11/27/20 1639  CT ABD PELV W CONT Final result    Impression:  IMPRESSION:   1. 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 2. Gallstones and some pericholecystic fluid, if cholecystitis is suspected   dedicated ultrasound should be considered.    3. There does appear to be a tiny common duct stone distally all below the duct   does not appear dilated with a stone measuring 3 mm.   4. Simple cyst left kidney with probable complex cyst left kidney if imaging   confirmation is desired nonemergent ultrasound should be considered. 5. Multiple diverticula without evidence of active diverticulitis. Initial impression was called to Dr. Layo Cowart in the ER at 4:49 PM November 27, 2020. Narrative:  CT abdomen and pelvis done with IV contrast using ER protocol November 27, 2020       Reference exam: None       Indication: Abdomen pain with nausea vomiting       Technique: Radiation dose reduction techniques were used for this study using   one or more of the following: automated exposure control; adjustment of mA   and/or kV (according to patient size);  iterative reconstruction. 5 mm axial   images were taken through the abdomen and pelvis using IV contrast using ER   protocol. 100 cc Isovue 370 IV contrast was administered to better evaluate the   abdominal and pelvic contents. Abdomen: The lung bases show a left-sided hiatal hernia with some adjacent   compressive atelectasis. Air-filled loop of bowel seen between the liver and the   right diaphragm, with diaphragm slips seen along the posterior lateral right   liver, the gallbladder shows multiple gallstones and is somewhat distended with   some adjacent pericholecystic fluid, the pancreas, spleen, adrenals appear   normal. Right kidney appears normal, left kidney midpole shows a 8mm 43   Hounsfield unit minimally complex cystic lesion with the midpole laterally   showing an exophytic 7 mm 15 Hounsfield unit probable simple cyst. There is no   intraperitoneal free air, nor abnormal adenopathy seen. Patient has a high   riding cecum, the appendix is not seen. Pelvis: There are multiple diverticula within an elongated sigmoid colon, the   bladder and pelvic sidewalls appear normal, the uterus is absent. There is no   intraperitoneal free air, ascites, nor abnormal adenopathy seen.                chest X-ray      I reviewed recent labs, recent radiologic studies, and pertinent records including other doctor notes if needed. I independently reviewed radiology images for studies I described above or studies I have ordered.    Admission date (for inpatients): 11/27/2020   * No surgery found *  * No surgery found *    ASSESSMENT/PLAN:  Problem List  Date Reviewed: 11/27/2020          Codes Class Noted    * (Principal) Large hiatal hernia (mixed sliding and paraesophageal dating back to 2016) ICD-10-CM: K44.9  ICD-9-CM: 553.3  11/27/2020        Non-intractable vomiting with nausea ICD-10-CM: R11.2  ICD-9-CM: 787.01  11/27/2020        Epigastric pain ICD-10-CM: R10.13  ICD-9-CM: 789.06  11/27/2020        Elevated troponin ICD-10-CM: R77.8  ICD-9-CM: 790.6  11/27/2020        Uncontrolled hypertension ICD-10-CM: I10  ICD-9-CM: 401.9  11/27/2020        Gallstones (since at least 2016) ICD-10-CM: K80.20  ICD-9-CM: 574.20  11/27/2020        Abnormal EKG ICD-10-CM: R94.31  ICD-9-CM: 794.31  11/27/2020        Chest pain ICD-10-CM: R07.9  ICD-9-CM: 786.50  11/27/2020        Hypokalemia ICD-10-CM: E87.6  ICD-9-CM: 276.8  11/27/2020            Principal Problem:    Large hiatal hernia (mixed sliding and paraesophageal dating back to 2016) (11/27/2020)    Active Problems:    Non-intractable vomiting with nausea (11/27/2020)      Epigastric pain (11/27/2020)      Elevated troponin (11/27/2020)      Uncontrolled hypertension (11/27/2020)      Gallstones (since at least 2016) (11/27/2020)      Abnormal EKG (11/27/2020)      Chest pain (11/27/2020)      Hypokalemia (11/27/2020)           Uncontrolled HTN, chest pain, abnormal EKG, and elevated troponin   Telemetry  Cardiology following  Echo pending  Troponin elevated x 2; Cardiology thinks related to demand ischemia and HTN  IV BP control     Large Mixed Hiatal and Paraesophageal Hernia/age 85  She was not felt to be a surgical candidate for this problem when evaluated at [address] in approximately 2016 and 2017  Her risks were felt to be prohibitive. This has not changed unless a life or death scenario develops as a result of the hernia. Bowel rest/NPO  IVF   Serial exams   It is encouraging that her lactic acid has remained normal throughout hospitalization   and that her chest pain has subsided substantially while in the emergency department. Gastrograffin UGI today      Gallstones  Abdominal US showed no GB wall thickening  HIDA planned Monday  Cont IV Zosyn       Hypokalemia  Replace K+, PO x 1 dose  Maint with 40KCL    Bilat flank pain and epigastric   Etiology unclear likely large hiatal hernia but could be cholecystitis alone  No source for the flank component of the pain on CT or US  Urinalysis normal  Pain meds as needed ordered for symptomatic relief      Renal cysts  Ultrasound shows only small simple left renal cysts        I have personally performed a face-to-face diagnostic evaluation and management  service on this patient. I have independently seen the patient. I have independently obtained the above history from the patient/family. I have independently examined the patient with above findings. I have independently reviewed data/labs for this patient and developed the above plan of care (MDM). Signed: Alysia Parker.  Urszula Joiner MD, FACS

## 2020-11-30 ENCOUNTER — APPOINTMENT (OUTPATIENT)
Dept: NUCLEAR MEDICINE | Age: 85
DRG: 418 | End: 2020-11-30
Attending: SURGERY
Payer: MEDICARE

## 2020-11-30 ENCOUNTER — APPOINTMENT (OUTPATIENT)
Dept: GENERAL RADIOLOGY | Age: 85
DRG: 418 | End: 2020-11-30
Attending: SURGERY
Payer: MEDICARE

## 2020-11-30 LAB
ALBUMIN SERPL-MCNC: 2.1 G/DL (ref 3.2–4.6)
ALBUMIN/GLOB SERPL: 0.6 {RATIO} (ref 1.2–3.5)
ALP SERPL-CCNC: 98 U/L (ref 50–136)
ALT SERPL-CCNC: 36 U/L (ref 12–65)
ANION GAP SERPL CALC-SCNC: 3 MMOL/L (ref 7–16)
AST SERPL-CCNC: 22 U/L (ref 15–37)
BILIRUB SERPL-MCNC: 0.5 MG/DL (ref 0.2–1.1)
BUN SERPL-MCNC: 6 MG/DL (ref 8–23)
CALCIUM SERPL-MCNC: 8.1 MG/DL (ref 8.3–10.4)
CHLORIDE SERPL-SCNC: 111 MMOL/L (ref 98–107)
CO2 SERPL-SCNC: 27 MMOL/L (ref 21–32)
CREAT SERPL-MCNC: 0.71 MG/DL (ref 0.6–1)
ERYTHROCYTE [DISTWIDTH] IN BLOOD BY AUTOMATED COUNT: 15.8 % (ref 11.9–14.6)
GLOBULIN SER CALC-MCNC: 3.7 G/DL (ref 2.3–3.5)
GLUCOSE SERPL-MCNC: 117 MG/DL (ref 65–100)
HCT VFR BLD AUTO: 31.9 % (ref 35.8–46.3)
HGB BLD-MCNC: 10.5 G/DL (ref 11.7–15.4)
LACTATE SERPL-SCNC: 0.9 MMOL/L (ref 0.4–2)
MCH RBC QN AUTO: 26.9 PG (ref 26.1–32.9)
MCHC RBC AUTO-ENTMCNC: 32.9 G/DL (ref 31.4–35)
MCV RBC AUTO: 81.8 FL (ref 79.6–97.8)
NRBC # BLD: 0 K/UL (ref 0–0.2)
PLATELET # BLD AUTO: 258 K/UL (ref 150–450)
PMV BLD AUTO: 10.6 FL (ref 9.4–12.3)
POTASSIUM SERPL-SCNC: 4.4 MMOL/L (ref 3.5–5.1)
PROT SERPL-MCNC: 5.8 G/DL (ref 6.3–8.2)
RBC # BLD AUTO: 3.9 M/UL (ref 4.05–5.2)
SODIUM SERPL-SCNC: 141 MMOL/L (ref 138–145)
WBC # BLD AUTO: 9.3 K/UL (ref 4.3–11.1)

## 2020-11-30 PROCEDURE — 80053 COMPREHEN METABOLIC PANEL: CPT

## 2020-11-30 PROCEDURE — 99233 SBSQ HOSP IP/OBS HIGH 50: CPT | Performed by: SURGERY

## 2020-11-30 PROCEDURE — 74011000250 HC RX REV CODE- 250: Performed by: SURGERY

## 2020-11-30 PROCEDURE — 74011000250 HC RX REV CODE- 250: Performed by: INTERNAL MEDICINE

## 2020-11-30 PROCEDURE — 65660000000 HC RM CCU STEPDOWN

## 2020-11-30 PROCEDURE — 74011250636 HC RX REV CODE- 250/636: Performed by: SURGERY

## 2020-11-30 PROCEDURE — 74011000636 HC RX REV CODE- 636: Performed by: SURGERY

## 2020-11-30 PROCEDURE — 36415 COLL VENOUS BLD VENIPUNCTURE: CPT

## 2020-11-30 PROCEDURE — 83605 ASSAY OF LACTIC ACID: CPT

## 2020-11-30 PROCEDURE — 77030038269 HC DRN EXT URIN PURWCK BARD -A

## 2020-11-30 PROCEDURE — 74011000258 HC RX REV CODE- 258: Performed by: SURGERY

## 2020-11-30 PROCEDURE — 78226 HEPATOBILIARY SYSTEM IMAGING: CPT

## 2020-11-30 PROCEDURE — 85027 COMPLETE CBC AUTOMATED: CPT

## 2020-11-30 PROCEDURE — 74240 X-RAY XM UPR GI TRC 1CNTRST: CPT

## 2020-11-30 RX ORDER — MORPHINE SULFATE 2 MG/ML
2 INJECTION, SOLUTION INTRAMUSCULAR; INTRAVENOUS
Status: DISCONTINUED | OUTPATIENT
Start: 2020-11-30 | End: 2020-12-01

## 2020-11-30 RX ORDER — SODIUM CHLORIDE 0.9 % (FLUSH) 0.9 %
10 SYRINGE (ML) INJECTION
Status: COMPLETED | OUTPATIENT
Start: 2020-11-30 | End: 2020-11-30

## 2020-11-30 RX ADMIN — MORPHINE SULFATE 2 MG: 10 INJECTION INTRAVENOUS at 04:08

## 2020-11-30 RX ADMIN — DEXTROSE MONOHYDRATE, SODIUM CHLORIDE, AND POTASSIUM CHLORIDE: 50; 4.5; 2.98 INJECTION, SOLUTION INTRAVENOUS at 22:42

## 2020-11-30 RX ADMIN — MORPHINE SULFATE 2 MG: 10 INJECTION INTRAVENOUS at 12:16

## 2020-11-30 RX ADMIN — Medication 30 ML: at 10:32

## 2020-11-30 RX ADMIN — ENALAPRILAT 1.25 MG: 1.25 INJECTION INTRAVENOUS at 15:20

## 2020-11-30 RX ADMIN — PIPERACILLIN SODIUM AND TAZOBACTAM SODIUM 3.38 G: 3; .375 INJECTION, POWDER, LYOPHILIZED, FOR SOLUTION INTRAVENOUS at 08:32

## 2020-11-30 RX ADMIN — MORPHINE SULFATE 2 MG: 2 INJECTION, SOLUTION INTRAMUSCULAR; INTRAVENOUS at 16:30

## 2020-11-30 RX ADMIN — DEXTROSE MONOHYDRATE, SODIUM CHLORIDE, AND POTASSIUM CHLORIDE: 50; 4.5; 2.98 INJECTION, SOLUTION INTRAVENOUS at 12:10

## 2020-11-30 RX ADMIN — FAMOTIDINE 20 MG: 10 INJECTION INTRAVENOUS at 08:32

## 2020-11-30 RX ADMIN — FAMOTIDINE 20 MG: 10 INJECTION INTRAVENOUS at 21:02

## 2020-11-30 RX ADMIN — PIPERACILLIN SODIUM AND TAZOBACTAM SODIUM 3.38 G: 3; .375 INJECTION, POWDER, LYOPHILIZED, FOR SOLUTION INTRAVENOUS at 00:05

## 2020-11-30 RX ADMIN — DIATRIZOATE MEGLUMINE AND DIATRIZOATE SODIUM 90 ML: 660; 100 LIQUID ORAL; RECTAL at 14:33

## 2020-11-30 RX ADMIN — PIPERACILLIN SODIUM AND TAZOBACTAM SODIUM 3.38 G: 3; .375 INJECTION, POWDER, LYOPHILIZED, FOR SOLUTION INTRAVENOUS at 15:19

## 2020-11-30 RX ADMIN — MORPHINE SULFATE 2 MG: 2 INJECTION, SOLUTION INTRAMUSCULAR; INTRAVENOUS at 21:01

## 2020-11-30 NOTE — ROUTINE PROCESS
Verbal bedside report received from Fabrice, [facility]. Assumed care of patient.

## 2020-11-30 NOTE — PROGRESS NOTES
H&P/Consult Note/Progress Note/Office Note:   Sukhwinder Valdivia  MRN: [id number]  :1935  Age:85 y.o.    HPI: Sukhwinder Valdivia is a 81 y.o. female with a long h/o gallstones and large mixed hiatal and paraesophageal hernia dating back at least to 2016   from studies at [facility] where she was followed and no surgery recommended. She came to the ER on 20 complaining of a 2-day history of pain that she states was primarily involving both of her flanks radiating to her epigastric region as well as the upper chest.    She specifically says she does not think the pain started in her epigastric region or her chest.    The pain was constant and progressive. Nothing in particular made it better or worse. She had associated nausea or vomiting. She was seen in an urgent treatment center and sent to the ER for further evaluation. While in the ER she is found to be significantly hypertensive with a blood pressure 175/99. Her troponin was markedly elevated at 56.7. WBC 17.3k;  LFTs and lipase were normal.  Lactic acid was also normal at 1.0. Urinalysis  Results for Channing Trujillo (MRN [id number]) as of 2020 19:29   Ref. Range 2020 17:33   Epithelial cells Latest Ref Range: 0 /hpf 0   Mucus Latest Ref Range: 0 /lpf 0   WBC Latest Ref Range: 0 /hpf 0-3   RBC Latest Ref Range: 0 /hpf 0   Bacteria Latest Ref Range: 0 /hpf 0   Crystals, urine Latest Ref Range: 0 /LPF 0   Casts Latest Ref Range: 0 /lpf 0   Other observations Latest Units:   RESULTS VERIFIED MANUALLY           She has a h/o GERD and prior hysterectomy.   Her large hiatal hernia has been known since at least  and has been imaged and evaluated multiple times at [facility] (now [facility])   She was evaluated at [facility] with esophagogram and CT imaging and she reports that the surgeon told her that she was not a surgical candidate for the hiatal hernia  and that her operative risks related to hiatal hernia repair were too high to proceed. She is unsure of her remote history but reports she thinks she had ovarian cancer in the 1950s   and had a hysterectomy followed by chemotherapy and radiation treatment. 10/14/16 CT chest (at [facility])  Impression:  Stable small pulmonary nodule in the right middle lobe.  Indeterminate.  Consider CT chest surveillance 6 months. No thoracic adenopathy. Moderate hiatal hernia. Gallstones. 4/23/18 CT chest (at [facility])  Impression:  Stable 5 mm nodule in the right middle lobe for 2 years. Benign in nature. No thoracic adenopathy. Large hiatal hernia. Multiple gallstones        11/27/18 esophagram (at [facility])  Large hiatal hernia. This is a mixed type hiatal hernia with the GE junction above the diaphragm, however, there is also a paraesophageal component involving the gastric fundus and body. Free GE reflux is demonstrated into the distal  2/3rds of the esophagus, however, there is no associated esophageal ulceration, erosions or mass lesion. There is no esophageal stenosis. A 13 mm diameter barium tablet passed rapidly to the esophagus to the stomach without obstruction. .    IMPRESSION:  1. Large mixed sliding and paraesophageal type hiatal hernia with a portion of the gastric fundus and gastric body in the lower chest.  2.  Extensive gastroesophageal reflux without evidence of associated esophageal ulceration or mass. .         11/27/20 portable CXR  IMPRESSION: Large hiatal hernia, grossly stable compared to prior chest x-rays.       11/27/20 EKG  NSR; Prolonged QT   Abnormal ECG   when compared with ECG of 08-DEC-2017 09:14, QT has lengthened       11/27/20 CT abd/pelvis with IV contrast  Abdomen: The lung bases show a left-sided hiatal hernia with some adjacent  compressive atelectasis.  Air-filled loop of bowel seen between the liver and the  right diaphragm, with diaphragm slips seen along the posterior lateral right  liver, the gallbladder shows multiple gallstones and is somewhat distended with  some adjacent pericholecystic fluid, the pancreas, spleen, adrenals appear  normal. Right kidney appears normal, left kidney midpole shows a 8mm 43  Hounsfield unit minimally complex cystic lesion with the midpole laterally  showing an exophytic 7 mm 15 Hounsfield unit probable simple cyst. There is no  intraperitoneal free air, nor abnormal adenopathy seen. Patient has a high  riding cecum, the appendix is not seen.     Pelvis: There are multiple diverticula within an elongated sigmoid colon, the  bladder and pelvic sidewalls appear normal, the uterus is absent. There is no  intraperitoneal free air, ascites, nor abnormal adenopathy seen.     IMPRESSION:  1. 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 2. Gallstones and some pericholecystic fluid, if cholecystitis is suspected dedicated ultrasound should be considered. 3. There does appear to be a tiny common duct stone distally all below the duct does not appear dilated with a stone measuring 3 mm. 4. Simple cyst left kidney with probable complex cyst left kidney if imaging confirmation is desired nonemergent US should be considered. 5. Multiple diverticula without evidence of active diverticulitis.       11/28/20 echo  EF estimated 60-65%. No regional wall motion abnormalities. Wall thickness was normal. Left ventricular diastolic function parameters were normal. Avg E/e': 9.28.     RIGHT VENTRICLE: The size was normal. Systolic function was normal.  Estimated peak pressure was in the range of 25-30 mmHg. LEFT ATRIUM: Size was normal.  RIGHT ATRIUM: Size was normal.    SUMMARY:  -  Left ventricle: Systolic function was normal. EF 60-65%. No regional wall motion abnormalities. -  Inferior vena cava, hepatic veins: The respirophasic change in diameter was more than 50%. -  Tricuspid valve: There was mild regurgitation. 11/28/20 US abd  FINDINGS:   LIVER: 13.7 cm. Normal echogenicity.   No masses. BILE DUCTS: No intrahepatic bile duct dilatation. CBD diameter = 6 mm. GALLBLADDER: Mild gallbladder distention and multiple gallstones. No significant wall thickening. PANCREAS: Normal.  SPLEEN: Normal.     RIGHT KIDNEY: 10.1 cm. No mass or hydronephrosis. LEFT KIDNEY: 10.8 cm. 2 small renal cysts. No hydronephrosis. ABDOMINAL AORTA AND IVC: Normal in size. ASCITES: No free fluid.     IMPRESSION:   1. Distended gallbladder with multiple gallstones. ,  Concerning for early acute cholecystitis based on CT appearance.   2.  Small simple cysts in the left kidney.       Additonal hx:  11/28/20 echo pending; IV Abx for possible cholecystitis;  WBC improving; lactic acid normal; LFTs normal; HIDA Monday to look for GB filling  11/29/20 abd pain persists; comes and goes;no SOB; no CP;  lactic acid normal; wbc coming down; UGI today; HIDA in am; cardiology thinks troponin elevation related to HTN/demand ischemia  11/30/20 Intermittent epigastric pain; WBC 9.3k; lactic acid normal               Past Medical History:   Diagnosis Date    Gastrointestinal disorder     acid reflux    Hypertension      Past Surgical History:   Procedure Laterality Date    HX GYN      cervical CA, hysterectomy    HX ORTHOPAEDIC      knee     Current Facility-Administered Medications   Medication Dose Route Frequency    dextrose 5% - 0.45% NaCl with KCl 40 mEq/L infusion   IntraVENous CONTINUOUS    enalaprilat (VASOTEC) injection 1.25 mg  1.25 mg IntraVENous Q6H PRN    piperacillin-tazobactam (ZOSYN) 3.375 g in 0.9% sodium chloride (MBP/ADV) 100 mL MBP  3.375 g IntraVENous Q8H    enoxaparin (LOVENOX) injection 40 mg  40 mg SubCUTAneous Q24H    ondansetron (ZOFRAN) injection 4 mg  4 mg IntraVENous Q4H PRN    famotidine (PF) (PEPCID) 20 mg in 0.9% sodium chloride 10 mL injection  20 mg IntraVENous Q12H    morphine 10 mg/ml injection 2 mg  2 mg IntraVENous Q1H PRN    acetaminophen (TYLENOL) solution 1,000 mg  1,000 mg Oral Q6H PRN     Patient has no known allergies. Social History     Socioeconomic History    Marital status:      Spouse name: Not on file    Number of children: Not on file    Years of education: Not on file    Highest education level: Not on file   Tobacco Use    Smoking status: Never Smoker    Smokeless tobacco: Never Used   Substance and Sexual Activity    Alcohol use: No    Drug use: No    Sexual activity: Never     Social History     Tobacco Use   Smoking Status Never Smoker   Smokeless Tobacco Never Used     History reviewed. No pertinent family history. ROS: The patient has no difficulty with chest pain or shortness of breath. No fever or chills. Comprehensive review of systems was otherwise unremarkable except as noted above. Physical Exam:   Visit Vitals  BP (!) 175/95 (BP 1 Location: Left arm, BP Patient Position: At rest)   Pulse 64   Temp 98.9 °F (37.2 °C)   Resp 20   Ht 5' 3\" (1.6 m)   Wt 179 lb 3.2 oz (81.3 kg)   SpO2 95%   BMI 31.74 kg/m²     Vitals:    11/29/20 1700 11/29/20 2036 11/30/20 0011 11/30/20 0432   BP: (!) 155/70 (!) 163/82 128/72 (!) 175/95   Pulse: 63 69 66 64   Resp: 18 20 20 20   Temp: 99 °F (37.2 °C) 100 °F (37.8 °C) 98.9 °F (37.2 °C) 98.9 °F (37.2 °C)   SpO2: 96% 95% 95% 95%   Weight:    179 lb 3.2 oz (81.3 kg)   Height:         11/29 1901 - 11/30 0700  In: 2370 [I.V.:2370]  Out: -   11/28 0701 - 11/29 1900  In: 4306 [I.V.:2420]  Out: 675 [Urine:675]    Constitutional: Alert, oriented, cooperative patient in no acute distress; appears stated age    Eyes:Sclera are clear. EOMs intact  ENMT: no external lesions gross hearing normal; no obvious neck masses, no ear or lip lesions, nares normal  CV: RRR. Normal perfusion  Resp: No JVD. Breathing is  non-labored; no audible wheezing.     GI: soft and non-distended; minimal periumbilical and epigastric pain; no guarding or peritoneal signs; no sign RUQ tenderness      Musculoskeletal: unremarkable with normal function. No embolic signs or cyanosis. Neuro:  Oriented; moves all 4; no focal deficits  Psychiatric: normal affect and mood, no memory impairment    Recent vitals (if inpt):  Patient Vitals for the past 24 hrs:   BP Temp Pulse Resp SpO2 Weight   11/30/20 0432 (!) 175/95 98.9 °F (37.2 °C) 64 20 95 % 179 lb 3.2 oz (81.3 kg)   11/30/20 0011 128/72 98.9 °F (37.2 °C) 66 20 95 %    11/29/20 2036 (!) 163/82 100 °F (37.8 °C) 69 20 95 %    11/29/20 1700 (!) 155/70 99 °F (37.2 °C) 63 18 96 %    11/29/20 1250 (!) 142/67 98.4 °F (36.9 °C) 63 18 93 %    11/29/20 0800 (!) 157/73 98 °F (36.7 °C) 67 18 94 %        Labs:  Recent Labs     11/30/20  0347  11/27/20  1401   WBC 9.3   < > 17.3*   HGB 10.5*   < > 14.6      < > 286      < > 138   K 4.4   < > 3.1*   *   < > 100   CO2 27   < > 27   BUN 6*   < > 12   CREA 0.71   < > 0.77   *   < > 124*   TBILI 0.5   < > 0.7   ALT 36   < > 16   AP 98   < > 112   LPSE  --   --  72*    < > = values in this interval not displayed. Lab Results   Component Value Date/Time    WBC 9.3 11/30/2020 03:47 AM    HGB 10.5 (L) 11/30/2020 03:47 AM    PLATELET 676 46/49/9758 03:47 AM    Sodium 141 11/30/2020 03:47 AM    Potassium 4.4 11/30/2020 03:47 AM    Chloride 111 (H) 11/30/2020 03:47 AM    CO2 27 11/30/2020 03:47 AM    BUN 6 (L) 11/30/2020 03:47 AM    Creatinine 0.71 11/30/2020 03:47 AM    Glucose 117 (H) 11/30/2020 03:47 AM    Bilirubin, total 0.5 11/30/2020 03:47 AM    ALT (SGPT) 36 11/30/2020 03:47 AM    Alk. phosphatase 98 11/30/2020 03:47 AM    Lipase 72 (L) 11/27/2020 02:01 PM       CT Results  (Last 48 hours)    None        chest X-ray      I reviewed recent labs, recent radiologic studies, and pertinent records including other doctor notes if needed. I independently reviewed radiology images for studies I described above or studies I have ordered.    Admission date (for inpatients): 11/27/2020   * No surgery found *  * No surgery found *    ASSESSMENT/PLAN:  Problem List  Date Reviewed: 11/27/2020          Codes Class Noted    * (Principal) Large hiatal hernia (mixed sliding and paraesophageal dating back to 2016) ICD-10-CM: K44.9  ICD-9-CM: 553.3  11/27/2020        Non-intractable vomiting with nausea ICD-10-CM: R11.2  ICD-9-CM: 787.01  11/27/2020        Epigastric pain ICD-10-CM: R10.13  ICD-9-CM: 789.06  11/27/2020        Elevated troponin ICD-10-CM: R77.8  ICD-9-CM: 790.6  11/27/2020        Uncontrolled hypertension ICD-10-CM: I10  ICD-9-CM: 401.9  11/27/2020        Gallstones (since at least 2016) ICD-10-CM: K80.20  ICD-9-CM: 574.20  11/27/2020        Abnormal EKG ICD-10-CM: R94.31  ICD-9-CM: 794.31  11/27/2020        Chest pain ICD-10-CM: R07.9  ICD-9-CM: 786.50  11/27/2020        Hypokalemia ICD-10-CM: E87.6  ICD-9-CM: 276.8  11/27/2020            Principal Problem:    Large hiatal hernia (mixed sliding and paraesophageal dating back to 2016) (11/27/2020)    Active Problems:    Non-intractable vomiting with nausea (11/27/2020)      Epigastric pain (11/27/2020)      Elevated troponin (11/27/2020)      Uncontrolled hypertension (11/27/2020)      Gallstones (since at least 2016) (11/27/2020)      Abnormal EKG (11/27/2020)      Chest pain (11/27/2020)      Hypokalemia (11/27/2020)           Uncontrolled HTN, chest pain, abnormal EKG, and elevated troponin   Telemetry  Cardiology following  Echo as above in HPI  Troponin elevated x 2; Cardiology thinks related to demand ischemia and HTN  IV BP control     Large Mixed Hiatal and Paraesophageal Hernia/age 85  She was not felt to be a surgical candidate for this problem when evaluated at [facility] in approximately 2016 and 2017  Her risks were felt to be prohibitive. This has not changed.      Bowel rest/NPO  IVF   Serial exams   It is encouraging that her lactic acid has remained normal throughout hospitalization   and that her chest pain has subsided substantially while in the emergency department. I ordered Gastrograffin UGI on Sunday 11/29/20   It has not yet been done  Await results of UGI        Gallstones  Abdominal US showed no GB wall thickening  HIDA also planned today to evaluate further  Cont IV Zosyn     CT with possible CBD stone but LFTs normal and there was no ductal dilation on US  Consider MRCP later and consult GI if needed        Hypokalemia  Replace K+, PO x 1 dose  Maint with 40KCL    Bilat flank pain and epigastric   Etiology unclear likely large hiatal hernia but could be cholecystitis alone  No source for the flank component of the pain on CT or US  Urinalysis normal  Pain meds as needed ordered for symptomatic relief      Renal cysts  Ultrasound shows only small simple left renal cysts        I have personally performed a face-to-face diagnostic evaluation and management  service on this patient. I have independently seen the patient. I have independently obtained the above history from the patient/family. I have independently examined the patient with above findings. I have independently reviewed data/labs for this patient and developed the above plan of care (MDM). Signed: Amos Silveira.  Shirley Marquez MD, FACS

## 2020-11-30 NOTE — PROGRESS NOTES
CM chart reviewed. Patient started 11/29 with epigastric and diffuse abdominal pain radiating outwards. Has some pain rating up into the chest.  Has some pain radiating to bilateral lower flanks. Nausea and vomiting present. No dysuria hematuria. No diarrhea or constipation. No shortness of breath. Went to PCP or urgent care and was sent to [facility] for further evaluation. CM verified insurance, will need to confirm home situation and PCP. No d/c needs identified at this time but will monitor.     Care Management Interventions  PCP Verified by CM: No  Mode of Transport at Discharge: (Family)  Transition of Care Consult (CM Consult): Discharge Planning  Current Support Network: Family Lives [place], Own Home  Confirm Follow Up Transport: Family  The Plan for Transition of Care is Related to the Following Treatment Goals : Return to baseline  Discharge Location  Discharge Placement: Home

## 2020-12-01 ENCOUNTER — ANESTHESIA EVENT (OUTPATIENT)
Dept: SURGERY | Age: 85
DRG: 418 | End: 2020-12-01
Payer: MEDICARE

## 2020-12-01 ENCOUNTER — ANESTHESIA (OUTPATIENT)
Dept: SURGERY | Age: 85
DRG: 418 | End: 2020-12-01
Payer: MEDICARE

## 2020-12-01 PROBLEM — K80.00 ACUTE CALCULOUS CHOLECYSTITIS: Status: ACTIVE | Noted: 2020-11-27

## 2020-12-01 LAB
ALBUMIN SERPL-MCNC: 2.3 G/DL (ref 3.2–4.6)
ALBUMIN/GLOB SERPL: 0.6 {RATIO} (ref 1.2–3.5)
ALP SERPL-CCNC: 110 U/L (ref 50–136)
ALT SERPL-CCNC: 48 U/L (ref 12–65)
ANION GAP SERPL CALC-SCNC: 5 MMOL/L (ref 7–16)
AST SERPL-CCNC: 33 U/L (ref 15–37)
BILIRUB SERPL-MCNC: 0.5 MG/DL (ref 0.2–1.1)
BUN SERPL-MCNC: 6 MG/DL (ref 8–23)
CALCIUM SERPL-MCNC: 8.7 MG/DL (ref 8.3–10.4)
CHLORIDE SERPL-SCNC: 109 MMOL/L (ref 98–107)
CO2 SERPL-SCNC: 27 MMOL/L (ref 21–32)
CREAT SERPL-MCNC: 0.77 MG/DL (ref 0.6–1)
ERYTHROCYTE [DISTWIDTH] IN BLOOD BY AUTOMATED COUNT: 15.9 % (ref 11.9–14.6)
GLOBULIN SER CALC-MCNC: 4 G/DL (ref 2.3–3.5)
GLUCOSE SERPL-MCNC: 119 MG/DL (ref 65–100)
HCT VFR BLD AUTO: 34.4 % (ref 35.8–46.3)
HGB BLD-MCNC: 10.8 G/DL (ref 11.7–15.4)
LACTATE SERPL-SCNC: 0.7 MMOL/L (ref 0.4–2)
MCH RBC QN AUTO: 26.3 PG (ref 26.1–32.9)
MCHC RBC AUTO-ENTMCNC: 31.4 G/DL (ref 31.4–35)
MCV RBC AUTO: 83.9 FL (ref 79.6–97.8)
NRBC # BLD: 0 K/UL (ref 0–0.2)
PLATELET # BLD AUTO: 282 K/UL (ref 150–450)
PMV BLD AUTO: 10.8 FL (ref 9.4–12.3)
POTASSIUM SERPL-SCNC: 4.3 MMOL/L (ref 3.5–5.1)
PROT SERPL-MCNC: 6.3 G/DL (ref 6.3–8.2)
RBC # BLD AUTO: 4.1 M/UL (ref 4.05–5.2)
SODIUM SERPL-SCNC: 141 MMOL/L (ref 138–145)
WBC # BLD AUTO: 10 K/UL (ref 4.3–11.1)

## 2020-12-01 PROCEDURE — 0DNW4ZZ RELEASE PERITONEUM, PERCUTANEOUS ENDOSCOPIC APPROACH: ICD-10-PCS | Performed by: SURGERY

## 2020-12-01 PROCEDURE — 74011250636 HC RX REV CODE- 250/636: Performed by: NURSE ANESTHETIST, CERTIFIED REGISTERED

## 2020-12-01 PROCEDURE — 77030039425 HC BLD LARYNG TRULITE DISP TELE -A: Performed by: ANESTHESIOLOGY

## 2020-12-01 PROCEDURE — 76010000162 HC OR TIME 1.5 TO 2 HR INTENSV-TIER 1: Performed by: SURGERY

## 2020-12-01 PROCEDURE — 65270000029 HC RM PRIVATE

## 2020-12-01 PROCEDURE — 85027 COMPLETE CBC AUTOMATED: CPT

## 2020-12-01 PROCEDURE — 77030002996 HC SUT SLK J&J -A: Performed by: SURGERY

## 2020-12-01 PROCEDURE — 77030011248 HC DRN WND RESERV CARD -A: Performed by: SURGERY

## 2020-12-01 PROCEDURE — 77030009851 HC PCH RTVR ENDOSC AMR -B: Performed by: SURGERY

## 2020-12-01 PROCEDURE — 80053 COMPREHEN METABOLIC PANEL: CPT

## 2020-12-01 PROCEDURE — 77030035044 HC TRCR ENDOSC VRSPRT BLDLSS COVD -C: Performed by: SURGERY

## 2020-12-01 PROCEDURE — 74011000250 HC RX REV CODE- 250: Performed by: NURSE ANESTHETIST, CERTIFIED REGISTERED

## 2020-12-01 PROCEDURE — 74011000258 HC RX REV CODE- 258: Performed by: SURGERY

## 2020-12-01 PROCEDURE — 36415 COLL VENOUS BLD VENIPUNCTURE: CPT

## 2020-12-01 PROCEDURE — 77030018875 HC APPL CLP LIG4 J&J -B: Performed by: SURGERY

## 2020-12-01 PROCEDURE — 77030008756 HC TU IRR SUC STRY -B: Performed by: SURGERY

## 2020-12-01 PROCEDURE — 76210000006 HC OR PH I REC 0.5 TO 1 HR: Performed by: SURGERY

## 2020-12-01 PROCEDURE — 77030008462 HC STPLR SKN PROX J&J -A: Performed by: SURGERY

## 2020-12-01 PROCEDURE — 65660000000 HC RM CCU STEPDOWN

## 2020-12-01 PROCEDURE — 74011250636 HC RX REV CODE- 250/636: Performed by: ANESTHESIOLOGY

## 2020-12-01 PROCEDURE — 94760 N-INVAS EAR/PLS OXIMETRY 1: CPT

## 2020-12-01 PROCEDURE — 47562 LAPAROSCOPIC CHOLECYSTECTOMY: CPT | Performed by: SURGERY

## 2020-12-01 PROCEDURE — 76060000034 HC ANESTHESIA 1.5 TO 2 HR: Performed by: SURGERY

## 2020-12-01 PROCEDURE — 83605 ASSAY OF LACTIC ACID: CPT

## 2020-12-01 PROCEDURE — 77030031139 HC SUT VCRL2 J&J -A: Performed by: SURGERY

## 2020-12-01 PROCEDURE — 77030008518 HC TBNG INSUF ENDO STRY -B: Performed by: SURGERY

## 2020-12-01 PROCEDURE — 77030038552 HC DRN WND MDII -A: Performed by: SURGERY

## 2020-12-01 PROCEDURE — 77030021158 HC TRCR BLN GELPRT AMR -B: Performed by: SURGERY

## 2020-12-01 PROCEDURE — 88304 TISSUE EXAM BY PATHOLOGIST: CPT

## 2020-12-01 PROCEDURE — 77030008606 HC TRCR ENDOSC KII AMR -B: Performed by: SURGERY

## 2020-12-01 PROCEDURE — 74011000250 HC RX REV CODE- 250: Performed by: SURGERY

## 2020-12-01 PROCEDURE — 74011250636 HC RX REV CODE- 250/636: Performed by: SURGERY

## 2020-12-01 PROCEDURE — 77030040922 HC BLNKT HYPOTHRM STRY -A: Performed by: ANESTHESIOLOGY

## 2020-12-01 PROCEDURE — 77030040361 HC SLV COMPR DVT MDII -B: Performed by: SURGERY

## 2020-12-01 PROCEDURE — 99233 SBSQ HOSP IP/OBS HIGH 50: CPT | Performed by: SURGERY

## 2020-12-01 PROCEDURE — 77030018876 HC APPL CLP LIG4 COVD -B: Performed by: SURGERY

## 2020-12-01 PROCEDURE — 77030037088 HC TUBE ENDOTRACH ORAL NSL COVD-A: Performed by: ANESTHESIOLOGY

## 2020-12-01 PROCEDURE — 77030007955 HC PCH ENDOSC SPEC J&J -B: Performed by: SURGERY

## 2020-12-01 PROCEDURE — 77030038269 HC DRN EXT URIN PURWCK BARD -A

## 2020-12-01 PROCEDURE — 77010033678 HC OXYGEN DAILY

## 2020-12-01 PROCEDURE — 2709999900 HC NON-CHARGEABLE SUPPLY: Performed by: SURGERY

## 2020-12-01 PROCEDURE — 2709999900 HC NON-CHARGEABLE SUPPLY

## 2020-12-01 PROCEDURE — 0FT44ZZ RESECTION OF GALLBLADDER, PERCUTANEOUS ENDOSCOPIC APPROACH: ICD-10-PCS | Performed by: SURGERY

## 2020-12-01 PROCEDURE — 77030000038 HC TIP SCIS LAPSCP SURI -B: Performed by: SURGERY

## 2020-12-01 PROCEDURE — 77030020829: Performed by: SURGERY

## 2020-12-01 RX ORDER — ALBUTEROL SULFATE 0.83 MG/ML
2.5 SOLUTION RESPIRATORY (INHALATION) AS NEEDED
Status: DISCONTINUED | OUTPATIENT
Start: 2020-12-01 | End: 2020-12-01 | Stop reason: HOSPADM

## 2020-12-01 RX ORDER — HYDROMORPHONE HYDROCHLORIDE 2 MG/ML
0.5 INJECTION, SOLUTION INTRAMUSCULAR; INTRAVENOUS; SUBCUTANEOUS
Status: DISCONTINUED | OUTPATIENT
Start: 2020-12-01 | End: 2020-12-01 | Stop reason: HOSPADM

## 2020-12-01 RX ORDER — BUPIVACAINE HYDROCHLORIDE 2.5 MG/ML
INJECTION, SOLUTION EPIDURAL; INFILTRATION; INTRACAUDAL AS NEEDED
Status: DISCONTINUED | OUTPATIENT
Start: 2020-12-01 | End: 2020-12-01 | Stop reason: HOSPADM

## 2020-12-01 RX ORDER — SODIUM CHLORIDE, SODIUM LACTATE, POTASSIUM CHLORIDE, CALCIUM CHLORIDE 600; 310; 30; 20 MG/100ML; MG/100ML; MG/100ML; MG/100ML
100 INJECTION, SOLUTION INTRAVENOUS CONTINUOUS
Status: DISCONTINUED | OUTPATIENT
Start: 2020-12-01 | End: 2020-12-01 | Stop reason: HOSPADM

## 2020-12-01 RX ORDER — ROCURONIUM BROMIDE 10 MG/ML
INJECTION, SOLUTION INTRAVENOUS AS NEEDED
Status: DISCONTINUED | OUTPATIENT
Start: 2020-12-01 | End: 2020-12-01 | Stop reason: HOSPADM

## 2020-12-01 RX ORDER — FENTANYL CITRATE 50 UG/ML
100 INJECTION, SOLUTION INTRAMUSCULAR; INTRAVENOUS ONCE
Status: DISCONTINUED | OUTPATIENT
Start: 2020-12-01 | End: 2020-12-01 | Stop reason: HOSPADM

## 2020-12-01 RX ORDER — DEXAMETHASONE SODIUM PHOSPHATE 4 MG/ML
INJECTION, SOLUTION INTRA-ARTICULAR; INTRALESIONAL; INTRAMUSCULAR; INTRAVENOUS; SOFT TISSUE AS NEEDED
Status: DISCONTINUED | OUTPATIENT
Start: 2020-12-01 | End: 2020-12-01

## 2020-12-01 RX ORDER — LABETALOL HYDROCHLORIDE 5 MG/ML
10 INJECTION, SOLUTION INTRAVENOUS ONCE
Status: DISCONTINUED | OUTPATIENT
Start: 2020-12-01 | End: 2020-12-01 | Stop reason: HOSPADM

## 2020-12-01 RX ORDER — OXYCODONE HYDROCHLORIDE 5 MG/1
5 TABLET ORAL
Status: DISCONTINUED | OUTPATIENT
Start: 2020-12-01 | End: 2020-12-01 | Stop reason: HOSPADM

## 2020-12-01 RX ORDER — SUCCINYLCHOLINE CHLORIDE 20 MG/ML
INJECTION INTRAMUSCULAR; INTRAVENOUS AS NEEDED
Status: DISCONTINUED | OUTPATIENT
Start: 2020-12-01 | End: 2020-12-01 | Stop reason: HOSPADM

## 2020-12-01 RX ORDER — HYDRALAZINE HYDROCHLORIDE 20 MG/ML
INJECTION INTRAMUSCULAR; INTRAVENOUS AS NEEDED
Status: DISCONTINUED | OUTPATIENT
Start: 2020-12-01 | End: 2020-12-01 | Stop reason: HOSPADM

## 2020-12-01 RX ORDER — DEXAMETHASONE SODIUM PHOSPHATE 4 MG/ML
INJECTION, SOLUTION INTRA-ARTICULAR; INTRALESIONAL; INTRAMUSCULAR; INTRAVENOUS; SOFT TISSUE AS NEEDED
Status: DISCONTINUED | OUTPATIENT
Start: 2020-12-01 | End: 2020-12-01 | Stop reason: HOSPADM

## 2020-12-01 RX ORDER — NALOXONE HYDROCHLORIDE 0.4 MG/ML
0.1 INJECTION, SOLUTION INTRAMUSCULAR; INTRAVENOUS; SUBCUTANEOUS AS NEEDED
Status: DISCONTINUED | OUTPATIENT
Start: 2020-12-01 | End: 2020-12-01 | Stop reason: HOSPADM

## 2020-12-01 RX ORDER — PROPOFOL 10 MG/ML
INJECTION, EMULSION INTRAVENOUS AS NEEDED
Status: DISCONTINUED | OUTPATIENT
Start: 2020-12-01 | End: 2020-12-01 | Stop reason: HOSPADM

## 2020-12-01 RX ORDER — DEXTROSE, SODIUM CHLORIDE, AND POTASSIUM CHLORIDE 5; .45; .15 G/100ML; G/100ML; G/100ML
30 INJECTION INTRAVENOUS CONTINUOUS
Status: DISCONTINUED | OUTPATIENT
Start: 2020-12-01 | End: 2020-12-07 | Stop reason: HOSPADM

## 2020-12-01 RX ORDER — ONDANSETRON 2 MG/ML
INJECTION INTRAMUSCULAR; INTRAVENOUS AS NEEDED
Status: DISCONTINUED | OUTPATIENT
Start: 2020-12-01 | End: 2020-12-01 | Stop reason: HOSPADM

## 2020-12-01 RX ORDER — ENOXAPARIN SODIUM 100 MG/ML
40 INJECTION SUBCUTANEOUS EVERY 24 HOURS
Status: DISCONTINUED | OUTPATIENT
Start: 2020-12-02 | End: 2020-12-07 | Stop reason: HOSPADM

## 2020-12-01 RX ORDER — MORPHINE SULFATE 2 MG/ML
2-4 INJECTION, SOLUTION INTRAMUSCULAR; INTRAVENOUS
Status: DISCONTINUED | OUTPATIENT
Start: 2020-12-01 | End: 2020-12-07 | Stop reason: HOSPADM

## 2020-12-01 RX ORDER — DIPHENHYDRAMINE HYDROCHLORIDE 50 MG/ML
12.5 INJECTION, SOLUTION INTRAMUSCULAR; INTRAVENOUS
Status: DISCONTINUED | OUTPATIENT
Start: 2020-12-01 | End: 2020-12-01 | Stop reason: HOSPADM

## 2020-12-01 RX ORDER — OXYCODONE HYDROCHLORIDE 5 MG/1
10 TABLET ORAL
Status: DISCONTINUED | OUTPATIENT
Start: 2020-12-01 | End: 2020-12-01 | Stop reason: HOSPADM

## 2020-12-01 RX ORDER — LIDOCAINE HYDROCHLORIDE 10 MG/ML
0.1 INJECTION INFILTRATION; PERINEURAL AS NEEDED
Status: DISCONTINUED | OUTPATIENT
Start: 2020-12-01 | End: 2020-12-01 | Stop reason: HOSPADM

## 2020-12-01 RX ORDER — NEOSTIGMINE METHYLSULFATE 1 MG/ML
INJECTION, SOLUTION INTRAVENOUS AS NEEDED
Status: DISCONTINUED | OUTPATIENT
Start: 2020-12-01 | End: 2020-12-01 | Stop reason: HOSPADM

## 2020-12-01 RX ORDER — GLYCOPYRROLATE 0.2 MG/ML
INJECTION INTRAMUSCULAR; INTRAVENOUS AS NEEDED
Status: DISCONTINUED | OUTPATIENT
Start: 2020-12-01 | End: 2020-12-01 | Stop reason: HOSPADM

## 2020-12-01 RX ORDER — MIDAZOLAM HYDROCHLORIDE 1 MG/ML
2 INJECTION, SOLUTION INTRAMUSCULAR; INTRAVENOUS
Status: DISCONTINUED | OUTPATIENT
Start: 2020-12-01 | End: 2020-12-01 | Stop reason: HOSPADM

## 2020-12-01 RX ORDER — MIDAZOLAM HYDROCHLORIDE 1 MG/ML
2 INJECTION, SOLUTION INTRAMUSCULAR; INTRAVENOUS ONCE
Status: DISCONTINUED | OUTPATIENT
Start: 2020-12-01 | End: 2020-12-01 | Stop reason: HOSPADM

## 2020-12-01 RX ORDER — ESMOLOL HYDROCHLORIDE 10 MG/ML
INJECTION INTRAVENOUS AS NEEDED
Status: DISCONTINUED | OUTPATIENT
Start: 2020-12-01 | End: 2020-12-01 | Stop reason: HOSPADM

## 2020-12-01 RX ORDER — LIDOCAINE HYDROCHLORIDE 20 MG/ML
INJECTION, SOLUTION EPIDURAL; INFILTRATION; INTRACAUDAL; PERINEURAL AS NEEDED
Status: DISCONTINUED | OUTPATIENT
Start: 2020-12-01 | End: 2020-12-01 | Stop reason: HOSPADM

## 2020-12-01 RX ORDER — ONDANSETRON 2 MG/ML
4 INJECTION INTRAMUSCULAR; INTRAVENOUS ONCE
Status: DISCONTINUED | OUTPATIENT
Start: 2020-12-01 | End: 2020-12-01 | Stop reason: HOSPADM

## 2020-12-01 RX ORDER — FENTANYL CITRATE 50 UG/ML
INJECTION, SOLUTION INTRAMUSCULAR; INTRAVENOUS AS NEEDED
Status: DISCONTINUED | OUTPATIENT
Start: 2020-12-01 | End: 2020-12-01 | Stop reason: HOSPADM

## 2020-12-01 RX ADMIN — SODIUM CHLORIDE, SODIUM LACTATE, POTASSIUM CHLORIDE, AND CALCIUM CHLORIDE: 600; 310; 30; 20 INJECTION, SOLUTION INTRAVENOUS at 14:41

## 2020-12-01 RX ADMIN — MORPHINE SULFATE 4 MG: 2 INJECTION, SOLUTION INTRAMUSCULAR; INTRAVENOUS at 20:16

## 2020-12-01 RX ADMIN — Medication 5 MG: at 16:09

## 2020-12-01 RX ADMIN — ESMOLOL HYDROCHLORIDE 20 MG: 10 INJECTION, SOLUTION INTRAVENOUS at 16:19

## 2020-12-01 RX ADMIN — HYDRALAZINE HYDROCHLORIDE 10 MG: 20 INJECTION INTRAMUSCULAR; INTRAVENOUS at 15:26

## 2020-12-01 RX ADMIN — GLYCOPYRROLATE 0.8 MG: 0.2 INJECTION, SOLUTION INTRAMUSCULAR; INTRAVENOUS at 16:09

## 2020-12-01 RX ADMIN — ROCURONIUM BROMIDE 10 MG: 10 INJECTION, SOLUTION INTRAVENOUS at 15:06

## 2020-12-01 RX ADMIN — FENTANYL CITRATE 50 MCG: 50 INJECTION INTRAMUSCULAR; INTRAVENOUS at 14:48

## 2020-12-01 RX ADMIN — ESMOLOL HYDROCHLORIDE 30 MG: 10 INJECTION, SOLUTION INTRAVENOUS at 16:16

## 2020-12-01 RX ADMIN — FAMOTIDINE 20 MG: 10 INJECTION INTRAVENOUS at 20:15

## 2020-12-01 RX ADMIN — FENTANYL CITRATE 50 MCG: 50 INJECTION INTRAMUSCULAR; INTRAVENOUS at 15:16

## 2020-12-01 RX ADMIN — HYDROMORPHONE HYDROCHLORIDE 0.5 MG: 2 INJECTION INTRAMUSCULAR; INTRAVENOUS; SUBCUTANEOUS at 16:50

## 2020-12-01 RX ADMIN — HYDRALAZINE HYDROCHLORIDE 10 MG: 20 INJECTION INTRAMUSCULAR; INTRAVENOUS at 16:25

## 2020-12-01 RX ADMIN — LIDOCAINE HYDROCHLORIDE 50 MG: 20 INJECTION, SOLUTION EPIDURAL; INFILTRATION; INTRACAUDAL; PERINEURAL at 14:48

## 2020-12-01 RX ADMIN — PIPERACILLIN SODIUM AND TAZOBACTAM SODIUM 3.38 G: 3; .375 INJECTION, POWDER, LYOPHILIZED, FOR SOLUTION INTRAVENOUS at 00:34

## 2020-12-01 RX ADMIN — DEXAMETHASONE SODIUM PHOSPHATE 4 MG: 4 INJECTION, SOLUTION INTRAMUSCULAR; INTRAVENOUS at 14:58

## 2020-12-01 RX ADMIN — FAMOTIDINE 20 MG: 10 INJECTION INTRAVENOUS at 09:10

## 2020-12-01 RX ADMIN — PROPOFOL 100 MG: 10 INJECTION, EMULSION INTRAVENOUS at 14:48

## 2020-12-01 RX ADMIN — SUCCINYLCHOLINE CHLORIDE 120 MG: 20 INJECTION, SOLUTION INTRAMUSCULAR; INTRAVENOUS at 14:49

## 2020-12-01 RX ADMIN — FENTANYL CITRATE 50 MCG: 50 INJECTION INTRAMUSCULAR; INTRAVENOUS at 15:02

## 2020-12-01 RX ADMIN — HYDROMORPHONE HYDROCHLORIDE 0.5 MG: 2 INJECTION INTRAMUSCULAR; INTRAVENOUS; SUBCUTANEOUS at 16:43

## 2020-12-01 RX ADMIN — MORPHINE SULFATE 2 MG: 2 INJECTION, SOLUTION INTRAMUSCULAR; INTRAVENOUS at 06:13

## 2020-12-01 RX ADMIN — ROCURONIUM BROMIDE 15 MG: 10 INJECTION, SOLUTION INTRAVENOUS at 14:58

## 2020-12-01 RX ADMIN — ONDANSETRON 4 MG: 2 INJECTION INTRAMUSCULAR; INTRAVENOUS at 15:54

## 2020-12-01 RX ADMIN — DEXTROSE MONOHYDRATE, SODIUM CHLORIDE, AND POTASSIUM CHLORIDE 100 ML/HR: 50; 4.5; 1.49 INJECTION, SOLUTION INTRAVENOUS at 07:14

## 2020-12-01 RX ADMIN — ROCURONIUM BROMIDE 10 MG: 10 INJECTION, SOLUTION INTRAVENOUS at 15:01

## 2020-12-01 RX ADMIN — ROCURONIUM BROMIDE 5 MG: 10 INJECTION, SOLUTION INTRAVENOUS at 14:48

## 2020-12-01 RX ADMIN — HYDROMORPHONE HYDROCHLORIDE 0.5 MG: 2 INJECTION INTRAMUSCULAR; INTRAVENOUS; SUBCUTANEOUS at 17:10

## 2020-12-01 RX ADMIN — ROCURONIUM BROMIDE 10 MG: 10 INJECTION, SOLUTION INTRAVENOUS at 15:45

## 2020-12-01 RX ADMIN — FENTANYL CITRATE 50 MCG: 50 INJECTION INTRAMUSCULAR; INTRAVENOUS at 15:21

## 2020-12-01 RX ADMIN — MORPHINE SULFATE 2 MG: 2 INJECTION, SOLUTION INTRAMUSCULAR; INTRAVENOUS at 09:11

## 2020-12-01 RX ADMIN — PIPERACILLIN SODIUM AND TAZOBACTAM SODIUM 3.38 G: 3; .375 INJECTION, POWDER, LYOPHILIZED, FOR SOLUTION INTRAVENOUS at 09:10

## 2020-12-01 RX ADMIN — HYDROMORPHONE HYDROCHLORIDE 0.5 MG: 2 INJECTION INTRAMUSCULAR; INTRAVENOUS; SUBCUTANEOUS at 16:55

## 2020-12-01 NOTE — OP NOTES
[address] [facility]  [phone]    OPERATVE REPORT    Name: Flory Waters     Date of Surgery: 12/1/2020  Med Record Number: [id number]   Age: 81 y.o. Sex: female   Pre-operative Diagnosis: Acute Calculous Cholecystitis  Post-operative Diagnosis: same; rule out carcinoma  Procedure: Laparoscopic Cholecystectomy (CPT 87455-52)  Surgeon: Ke Lopez MD  Asistants: none  Anesthesia:  General  Complications: none  Specimen: gallbladder to path  Estimated Blood Loss: <30cc    Procedure Description:   The risks, benefits, potential complications, treatment options, and expected outcomes were discussed with the patient pre-operatively. The patient voiced understanding and gave informed consent preoperatively. The patient was taken to the Operating Room, and the [address] [place] [address] time-out protocol checklist was followed. After the induction of adequate anesthesia, the abdomen was prepped and draped in the usual sterile fashion. Preoperative antibiotics were given. A sub umbilical incision was made and a cut down approach was used to place a blunt Nicolle trochar under direct vision. There were copious and dense adhesions involving the entire upper abdomen which had to be lysed just to get working room for the laparoscopic. This was difficult, tedious, and added time to the case. After adequate pneumoperitioneum, two 5mm static and dynamic retraction ports were placed and an 11mm trochar also placed, all in the usual locations. The gallbladder was thickened, ischemic, pus-filled, friable. It burst when grasped and retracted. The colon was plastered across the galbaldder wall and had to be carefully dissected away. Early fistula formation was beginning. The pericholecystic tissues were also friable and edematous. Dissection was much more difficult and risky than normal.  We considered opening the patient several times.   Ultimately we were able to get a plane of dissection directly on the gallbladder wall. Inflammatory adhesions to the inferior aspect of the gallbladder were taken down. The infundibulum and cystic duct was friable and not safe to try to perform cholangiogram without compromising the exposed duct for closure. Careful and tedious dissection was performed to free up the cystic duct and artery from the surrounding tissues. A clear window was created between the inferior aspect of the gallbladder wall, the cystic duct, and the cystic artery. The cystic duct could clearly be seen to enter the gallbladder and the cystic artery seen to traverse on the gallbladder wall toward the fundus of the gallbladder. The critical view of safety was achieved. We placed 3 clips on the distal cystic duct (patient side) and 2 clips on the proximal (specimen side) of the cystic duct. We then placed 2 clips on the proximal cystic artery and 2 clips on the distal cystic artery. Both structures were then divided. The cystic artery could be seen to pulsate at its clipped end as usual.  The gallbladder was the removed from its attachments to the liver. Small venous tributaries were clipped as needed as dissection was carried up toward the gallbladder fundus. There appeared to be either tissue necrosis, fibrosis, or possible a malignancy at the gallbladder apex noted at the end of the case and this was marked with a suture for pathology to review and rule out carcinoma. The gallbladder was retracted out through the umbilical trochar suite with the camera placed temporarily through the epigastric trochar site. The gallbladder fossa was inspected. Copious irrigation was used. A 19 round Foster drain was placed in the subhepatic space and sutured at the exit site of the skin  Where the lateral-most 5mm trochar site was previously located. No bile leaks were seen, the clips on the artery and duct were intact and hemostatsis confirmed.   Marcaine was infiltrated into the preperitoneal tissues around each trochar site. The fascia of the umbilical incision was closed with interrupted 0 vicryl suture. Clips were placed to close the skin incisions. The wounds were dressed sterilely with telfa and tegaderm, All sponge, instruments, and needle counts were correct. The patient was taken to recovery in good condition.     Samara Carrasquillo MD, FACS

## 2020-12-01 NOTE — ANESTHESIA PREPROCEDURE EVALUATION
Relevant Problems   No relevant active problems       Anesthetic History     PONV          Review of Systems / Medical History  Patient summary reviewed, nursing notes reviewed and pertinent labs reviewed    Pulmonary                   Neuro/Psych              Cardiovascular    Hypertension              Exercise tolerance: >4 METS  Comments: Nl ecco   GI/Hepatic/Renal     GERD      Hiatal hernia     Endo/Other             Other Findings            Physical Exam    Airway  Mallampati: II  TM Distance: 4 - 6 cm  Neck ROM: normal range of motion   Mouth opening: Normal     Cardiovascular  Regular rate and rhythm,  S1 and S2 normal,  no murmur, click, rub, or gallop             Dental  No notable dental hx       Pulmonary  Breath sounds clear to auscultation               Abdominal         Other Findings            Anesthetic Plan    ASA: 2  Anesthesia type: general          Induction: Intravenous and RSI  Anesthetic plan and risks discussed with: Patient

## 2020-12-01 NOTE — ANESTHESIA POSTPROCEDURE EVALUATION
Procedure(s):  CHOLECYSTECTOMY LAPAROSCOPIC POSSIBLE OPEN. general    Anesthesia Post Evaluation      Multimodal analgesia: multimodal analgesia used between 6 hours prior to anesthesia start to PACU discharge  Patient location during evaluation: PACU  Patient participation: complete - patient participated  Level of consciousness: awake  Pain management: adequate  Airway patency: patent  Anesthetic complications: no  Cardiovascular status: acceptable  Respiratory status: acceptable, spontaneous ventilation and nonlabored ventilation  Hydration status: acceptable  Post anesthesia nausea and vomiting:  none      INITIAL Post-op Vital signs:   Vitals Value Taken Time   /68 12/1/2020  5:35 PM   Temp 37.4 °C (99.3 °F) 12/1/2020  4:32 PM   Pulse 78 12/1/2020  5:38 PM   Resp 14 12/1/2020  5:38 PM   SpO2 96 % 12/1/2020  5:38 PM   Vitals shown include unvalidated device data.

## 2020-12-01 NOTE — PERIOP NOTES
TRANSFER - IN REPORT:    Verbal report received from [facility]. on Silvana Gunderson  being received from  [id number] for ordered procedure      Report consisted of patients Situation, Background, Assessment and   Recommendations(SBAR). Information from the following report(s) SBAR and MAR was reviewed with the receiving nurse. Opportunity for questions and clarification was provided. Assessment to be completed upon patients arrival to unit and care assumed.

## 2020-12-01 NOTE — PROGRESS NOTES
initial visit. Met with patient at bedside, offered support, empathetic presence, prayer.     Suzie PRADO

## 2020-12-01 NOTE — ROUTINE PROCESS
TRANSFER - IN REPORT: 
 
Verbal report received from PACU, RN on Zafar Curry being received from PACU for routine progression of care Report consisted of patients Situation, Background, Assessment and Recommendations(SBAR). Information from the following report(s) SBAR, Kardex, OR Summary, Intake/Output, Med Rec Status and Cardiac Rhythm SR was reviewed with the receiving nurse. Opportunity for questions and clarification was provided. Assessment completed upon patients arrival to unit and care assumed.

## 2020-12-01 NOTE — PROGRESS NOTES
H&P/Consult Note/Progress Note/Office Note:   Fidelia Cordova  MRN: [id number]  :1935  Age:85 y.o.    HPI: Fidelia Cordova is a 81 y.o. female with a long h/o gallstones and large mixed hiatal and paraesophageal hernia dating back at least to 2016   from studies at [facility] where she was followed and no surgery recommended. She came to the ER on 20 complaining of a 2-day history of pain that she states was primarily involving both of her flanks radiating to her epigastric region as well as the upper chest.    She specifically says she does not think the pain started in her epigastric region or her chest.    The pain was constant and progressive. Nothing in particular made it better or worse. She had associated nausea or vomiting. She was seen in an urgent treatment center and sent to the ER for further evaluation. While in the ER she is found to be significantly hypertensive with a blood pressure 175/99. Her troponin was markedly elevated at 56.7. WBC 17.3k;  LFTs and lipase were normal.  Lactic acid was also normal at 1.0. Urinalysis  Results for Crystal Sheikh (MRN [id number]) as of 2020 19:29   Ref. Range 2020 17:33   Epithelial cells Latest Ref Range: 0 /hpf 0   Mucus Latest Ref Range: 0 /lpf 0   WBC Latest Ref Range: 0 /hpf 0-3   RBC Latest Ref Range: 0 /hpf 0   Bacteria Latest Ref Range: 0 /hpf 0   Crystals, urine Latest Ref Range: 0 /LPF 0   Casts Latest Ref Range: 0 /lpf 0   Other observations Latest Units:   RESULTS VERIFIED MANUALLY           She has a h/o GERD and prior hysterectomy.   Her large hiatal hernia has been known since at least  and has been imaged and evaluated multiple times at [address] (now [facility])   She was evaluated at [facility] with esophagogram and CT imaging and she reports that the surgeon told her that she was not a surgical candidate for the hiatal hernia  and that her operative risks related to hiatal hernia repair were too high to proceed. She is unsure of her remote history but reports she thinks she had ovarian cancer in the 1950s   and had a hysterectomy followed by chemotherapy and radiation treatment. 10/14/16 CT chest (at [facility])  Impression:  Stable small pulmonary nodule in the right middle lobe.  Indeterminate.  Consider CT chest surveillance 6 months. No thoracic adenopathy. Moderate hiatal hernia. Gallstones. 4/23/18 CT chest (at [facility])  Impression:  Stable 5 mm nodule in the right middle lobe for 2 years. Benign in nature. No thoracic adenopathy. Large hiatal hernia. Multiple gallstones        11/27/18 esophagram (at [facility])  Large hiatal hernia. This is a mixed type hiatal hernia with the GE junction above the diaphragm, however, there is also a paraesophageal component involving the gastric fundus and body. Free GE reflux is demonstrated into the distal  2/3rds of the esophagus, however, there is no associated esophageal ulceration, erosions or mass lesion. There is no esophageal stenosis. A 13 mm diameter barium tablet passed rapidly to the esophagus to the stomach without obstruction. .    IMPRESSION:  1. Large mixed sliding and paraesophageal type hiatal hernia with a portion of the gastric fundus and gastric body in the lower chest.  2.  Extensive gastroesophageal reflux without evidence of associated esophageal ulceration or mass. .         11/27/20 portable CXR  IMPRESSION: Large hiatal hernia, grossly stable compared to prior chest x-rays.       11/27/20 EKG  NSR; Prolonged QT   Abnormal ECG   when compared with ECG of 08-DEC-2017 09:14, QT has lengthened       11/27/20 CT abd/pelvis with IV contrast  Abdomen: The lung bases show a left-sided hiatal hernia with some adjacent  compressive atelectasis.  Air-filled loop of bowel seen between the liver and the  right diaphragm, with diaphragm slips seen along the posterior lateral right  liver, the gallbladder shows multiple gallstones and is somewhat distended with  some adjacent pericholecystic fluid, the pancreas, spleen, adrenals appear  normal. Right kidney appears normal, left kidney midpole shows a 8mm 43  Hounsfield unit minimally complex cystic lesion with the midpole laterally  showing an exophytic 7 mm 15 Hounsfield unit probable simple cyst. There is no  intraperitoneal free air, nor abnormal adenopathy seen. Patient has a high  riding cecum, the appendix is not seen.     Pelvis: There are multiple diverticula within an elongated sigmoid colon, the  bladder and pelvic sidewalls appear normal, the uterus is absent. There is no  intraperitoneal free air, ascites, nor abnormal adenopathy seen.     IMPRESSION:  1. 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 2. Gallstones and some pericholecystic fluid, if cholecystitis is suspected dedicated ultrasound should be considered. 3. There does appear to be a tiny common duct stone distally all below the duct does not appear dilated with a stone measuring 3 mm. 4. Simple cyst left kidney with probable complex cyst left kidney if imaging confirmation is desired nonemergent US should be considered. 5. Multiple diverticula without evidence of active diverticulitis.       11/28/20 echo  EF estimated 60-65%. No regional wall motion abnormalities. Wall thickness was normal. Left ventricular diastolic function parameters were normal. Avg E/e': 9.28.     RIGHT VENTRICLE: The size was normal. Systolic function was normal.  Estimated peak pressure was in the range of 25-30 mmHg. LEFT ATRIUM: Size was normal.  RIGHT ATRIUM: Size was normal.    SUMMARY:  -  Left ventricle: Systolic function was normal. EF 60-65%. No regional wall motion abnormalities. -  Inferior vena cava, hepatic veins: The respirophasic change in diameter was more than 50%. -  Tricuspid valve: There was mild regurgitation. 11/28/20 US abd  FINDINGS:   LIVER: 13.7 cm. Normal echogenicity.   No masses. BILE DUCTS: No intrahepatic bile duct dilatation. CBD diameter = 6 mm. GALLBLADDER: Mild gallbladder distention and multiple gallstones. No significant wall thickening. PANCREAS: Normal.  SPLEEN: Normal.     RIGHT KIDNEY: 10.1 cm. No mass or hydronephrosis. LEFT KIDNEY: 10.8 cm. 2 small renal cysts. No hydronephrosis. ABDOMINAL AORTA AND IVC: Normal in size. ASCITES: No free fluid.     IMPRESSION:   1. Distended gallbladder with multiple gallstones. ,  Concerning for early acute cholecystitis based on CT appearance. 2.  Small simple cysts in the left kidney. 11/30/20 Gastrograffin UGI  Patient swallowed barium without difficulty. Esophagus appeared normal.  Redemonstrated is a hiatal hernia. The GE junction is in normal anatomic  location with the fundus, body, antrum in a thoracic location. Enteric contrast  quickly fills the entire nondistended stomach and empties into the duodenum. After the stomach becomes progressively distended with contrast, there is  limited emptying into the duodenum. This results in moderate esophageal reflux.       Impression:   Type IV paraesophageal hernia. There is rapid transit of contrast through the intrathoracic stomach into the intra-abdominal duodenum and small bowel on initial imaging. When the stomach becomes progressively more distended with contrast, limited flow of contrast into the duodenum and small bowel is seen.          11/30/20 HIDA  Prompt and homogeneous uptake of tracer by the liver. Activity is rapidly excreted into the biliary system with no activity seen in the gallbladder by one hour. Patient was injected with 2 mg of morphine and imaged for an additional half hour without activity seen in the gallbladder. By the end of imaging the CBD and small bowel activity are seen.       IMPRESSION:    Obstructed cystic duct which can be seen with cholecystitis.         Additonal hx:  11/28/20 echo pending; IV Abx for possible cholecystitis;  WBC improving; lactic acid normal; LFTs normal; HIDA Monday to look for GB filling  11/29/20 abd pain persists; comes and goes;no SOB; no CP;  lactic acid normal; wbc coming down; UGI today; HIDA in am; cardiology thinks troponin elevation related to HTN/demand ischemia  11/30/20 Intermittent epigastric pain; WBC 9.3k; lactic acid normal   12/1/20  RUQ pain; HIDA show non-vis of GB suggestive of cholecystitis; UGI as above with no obstruction but slow emptying          Past Medical History:   Diagnosis Date    Gastrointestinal disorder     acid reflux    Hypertension      Past Surgical History:   Procedure Laterality Date    HX GYN      cervical CA, hysterectomy    HX ORTHOPAEDIC      knee     Current Facility-Administered Medications   Medication Dose Route Frequency    dextrose 5% - 0.45% NaCl with KCl 20 mEq/L infusion  100 mL/hr IntraVENous CONTINUOUS    [START ON 12/2/2020] enoxaparin (LOVENOX) injection 40 mg  40 mg SubCUTAneous Q24H    morphine injection 2 mg  2 mg IntraVENous Q1H PRN    enalaprilat (VASOTEC) injection 1.25 mg  1.25 mg IntraVENous Q6H PRN    piperacillin-tazobactam (ZOSYN) 3.375 g in 0.9% sodium chloride (MBP/ADV) 100 mL MBP  3.375 g IntraVENous Q8H    ondansetron (ZOFRAN) injection 4 mg  4 mg IntraVENous Q4H PRN    famotidine (PF) (PEPCID) 20 mg in 0.9% sodium chloride 10 mL injection  20 mg IntraVENous Q12H    acetaminophen (TYLENOL) solution 1,000 mg  1,000 mg Oral Q6H PRN     Patient has no known allergies.   Social History     Socioeconomic History    Marital status:      Spouse name: Not on file    Number of children: Not on file    Years of education: Not on file    Highest education level: Not on file   Tobacco Use    Smoking status: Never Smoker    Smokeless tobacco: Never Used   Substance and Sexual Activity    Alcohol use: No    Drug use: No    Sexual activity: Never     Social History     Tobacco Use   Smoking Status Never Smoker Smokeless Tobacco Never Used     History reviewed. No pertinent family history. ROS: The patient has no difficulty with chest pain or shortness of breath. No fever or chills. Comprehensive review of systems was otherwise unremarkable except as noted above. Physical Exam:   Visit Vitals  BP (!) 161/75   Pulse 62   Temp 98.7 °F (37.1 °C)   Resp 19   Ht 5' 3\" (1.6 m)   Wt 184 lb 12.8 oz (83.8 kg)   SpO2 95%   BMI 32.74 kg/m²     Vitals:    11/30/20 2044 12/01/20 0107 12/01/20 0503 12/01/20 0903   BP: (!) 154/83 (!) 176/75 (!) 158/71 (!) 161/75   Pulse: 66 65 71 62   Resp: 16 16 18 19   Temp: 98.3 °F (36.8 °C) 98.9 °F (37.2 °C) 99.1 °F (37.3 °C) 98.7 °F (37.1 °C)   SpO2: 96% 94% 92% 95%   Weight:   184 lb 12.8 oz (83.8 kg)    Height:         No intake/output data recorded. 11/29 1901 - 12/01 0700  In: 5070 [I.V.:5070]  Out: 1400 [Urine:1400]    Constitutional: Alert, oriented, cooperative patient in no acute distress; appears stated age    Eyes:Sclera are clear. EOMs intact  ENMT: no external lesions gross hearing normal; no obvious neck masses, no ear or lip lesions, nares normal  CV: RRR. Normal perfusion  Resp: No JVD. Breathing is  non-labored; no audible wheezing. GI: soft and non-distended; epigastric and RUQ tenderness to palpation. Musculoskeletal: unremarkable with normal function. No embolic signs or cyanosis.    Neuro:  Oriented; moves all 4; no focal deficits  Psychiatric: normal affect and mood, no memory impairment    Recent vitals (if inpt):  Patient Vitals for the past 24 hrs:   BP Temp Pulse Resp SpO2 Weight   12/01/20 0903 (!) 161/75 98.7 °F (37.1 °C) 62 19 95 %    12/01/20 0503 (!) 158/71 99.1 °F (37.3 °C) 71 18 92 % 184 lb 12.8 oz (83.8 kg)   12/01/20 0107 (!) 176/75 98.9 °F (37.2 °C) 65 16 94 %    11/30/20 2044 (!) 154/83 98.3 °F (36.8 °C) 66 16 96 %    11/30/20 1810 (!) 159/77        11/30/20 1627 (!) 194/88 98.8 °F (37.1 °C) 68 18 94 %    11/30/20 1509 (!) 200/87 98 °F (36.7 °C) 71 19 95 %        Labs:  Recent Labs     12/01/20  0402   WBC 10.0   HGB 10.8*         K 4.3   *   CO2 27   BUN 6*   CREA 0.77   *   TBILI 0.5   ALT 48          Lab Results   Component Value Date/Time    WBC 10.0 12/01/2020 04:02 AM    HGB 10.8 (L) 12/01/2020 04:02 AM    PLATELET 927 53/97/3239 04:02 AM    Sodium 141 12/01/2020 04:02 AM    Potassium 4.3 12/01/2020 04:02 AM    Chloride 109 (H) 12/01/2020 04:02 AM    CO2 27 12/01/2020 04:02 AM    BUN 6 (L) 12/01/2020 04:02 AM    Creatinine 0.77 12/01/2020 04:02 AM    Glucose 119 (H) 12/01/2020 04:02 AM    Bilirubin, total 0.5 12/01/2020 04:02 AM    ALT (SGPT) 48 12/01/2020 04:02 AM    Alk. phosphatase 110 12/01/2020 04:02 AM    Lipase 72 (L) 11/27/2020 02:01 PM       CT Results  (Last 48 hours)    None        chest X-ray      I reviewed recent labs, recent radiologic studies, and pertinent records including other doctor notes if needed. I independently reviewed radiology images for studies I described above or studies I have ordered.    Admission date (for inpatients): 11/27/2020   * No surgery found *  Procedure(s):  CHOLECYSTECTOMY LAPAROSCOPIC POSSIBLE OPEN    ASSESSMENT/PLAN:  Problem List  Date Reviewed: 12/1/2020          Codes Class Noted    Large hiatal hernia (mixed sliding and paraesophageal dating back to 2016) ICD-10-CM: K44.9  ICD-9-CM: 553.3  11/27/2020        Non-intractable vomiting with nausea ICD-10-CM: R11.2  ICD-9-CM: 787.01  11/27/2020        Epigastric pain ICD-10-CM: R10.13  ICD-9-CM: 789.06  11/27/2020        Elevated troponin ICD-10-CM: R77.8  ICD-9-CM: 790.6  11/27/2020        Uncontrolled hypertension ICD-10-CM: I10  ICD-9-CM: 401.9  11/27/2020        * (Principal) Acute calculous cholecystitis ICD-10-CM: K80.00  ICD-9-CM: 574.00  11/27/2020        Abnormal EKG ICD-10-CM: R94.31  ICD-9-CM: 794.31  11/27/2020        Chest pain ICD-10-CM: R07.9  ICD-9-CM: 786.50  11/27/2020        Hypokalemia ICD-10-CM: E87.6  ICD-9-CM: 276.8  11/27/2020            Principal Problem:    Acute calculous cholecystitis (11/27/2020)    Active Problems:    Large hiatal hernia (mixed sliding and paraesophageal dating back to 2016) (11/27/2020)      Non-intractable vomiting with nausea (11/27/2020)      Epigastric pain (11/27/2020)      Elevated troponin (11/27/2020)      Uncontrolled hypertension (11/27/2020)      Abnormal EKG (11/27/2020)      Chest pain (11/27/2020)      Hypokalemia (11/27/2020)           Acute calculous cholecystitis suggested by signs, symptoms, and HIDA scan results  LFTs normal  CT showed a distended GB with gallstones, pericholecystic fluid, and possible CBD stone   Abd US showed no GB wall thickening or ductal dialtion  HIDA showed non-vis of GB suggestive of acute cholecystitis    Cont IV Zosyn  I discussed the patient's condition with the patient at length and treatment options. I discussed risks of surgery (laparoscopic, possible open. cholecystectomy and possible cholangiogram) in language the patient could understand including bleeding, infection, need for further surgery, abscess, bile leak, injury to bowel or bile duct, blood clots, heart attack, stroke, risks of anesthesia, etc..... The patient voiced understanding of all this and all questions were answered. Alternatives to surgery were discussed also and risks of the alternatives. The patient requested that we proceed with surgery. Informed consent was obtained. We decided to proceed with surgery today  OR notified. Large Mixed Hiatal and Paraesophageal Hernia/age 85  She was not felt to be a surgical candidate for this problem when evaluated at [facility] in approximately 2016 and 2017  Her risks were felt to be prohibitive. This has not changed.      Bowel rest/NPO  IVF   Serial exams   Lactic acid has remained normal throughout hospitalization   and chest pain subsided within 8 hrs of arrival in the emergency department with NPO status. UGI shows rapid stomach emptying as long as stomach is not overdistended  She will need to take only small meals. She reports she has already figured that out on her own over the yrs and has trouble if she overeats  Will hold off on surgery for paraesophageal hernia at this time            Uncontrolled HTN, chest pain, abnormal EKG, and elevated troponin   Telemetry  Cardiology following  Echo as above in HPI  Troponin elevated x 2; Cardiology thinks related to demand ischemia and HTN  IV BP control     Hypokalemia  Replaced   Maint with 20KCL    Bilat flank pain  Resolved soon after admission  Etiology unclear  Possibly musculoskeletal   No source for the flank component of the pain on CT or US  Urinalysis normal  Pain meds prn for symptomatic relief      Renal cysts  US shows only small simple left renal cysts  No further follow-up needed for these        I have personally performed a face-to-face diagnostic evaluation and management  service on this patient. I have independently seen the patient. I have independently obtained the above history from the patient/family. I have independently examined the patient with above findings. I have independently reviewed data/labs for this patient and developed the above plan of care (MDM). Signed: Danni Arriola.  Felicity Durant MD, FACS

## 2020-12-01 NOTE — PERIOP NOTES
TRANSFER - OUT REPORT:    Verbal report given to [id number] on Myriam Nett  being transferred to room 717 for routine post - op       Report consisted of patients Situation, Background, Assessment and   Recommendations(SBAR). Information from the following report(s) SBAR, Kardex, OR Summary, Intake/Output and MAR was reviewed with the receiving nurse. Lines:   Peripheral IV 12/01/20 Anterior; Left Hand (Active)   Site Assessment Clean, dry, & intact 12/01/20 1632   Phlebitis Assessment 0 12/01/20 1632   Infiltration Assessment 0 12/01/20 1632   Dressing Status Clean, dry, & intact 12/01/20 1632   Dressing Type Transparent;Tape 12/01/20 1632   Hub Color/Line Status Pink; Infusing 12/01/20 1632        Opportunity for questions and clarification was provided. Patient transported with:   O2 @ 3 liters    VTE prophylaxis orders have been written for Myriam Nett.

## 2020-12-01 NOTE — ROUTINE PROCESS
Verbal bedside report given to [facility], oncoming RN. Patient's situation, background, assessment and recommendations provided. Opportunity for questions provided. Oncoming RN assumed care of patient.

## 2020-12-02 LAB
ALBUMIN SERPL-MCNC: 2.3 G/DL (ref 3.2–4.6)
ALBUMIN/GLOB SERPL: 0.5 {RATIO} (ref 1.2–3.5)
ALP SERPL-CCNC: 111 U/L (ref 50–136)
ALT SERPL-CCNC: 62 U/L (ref 12–65)
ANION GAP SERPL CALC-SCNC: 6 MMOL/L (ref 7–16)
AST SERPL-CCNC: 47 U/L (ref 15–37)
BILIRUB SERPL-MCNC: 0.6 MG/DL (ref 0.2–1.1)
BUN SERPL-MCNC: 10 MG/DL (ref 8–23)
CALCIUM SERPL-MCNC: 9.1 MG/DL (ref 8.3–10.4)
CHLORIDE SERPL-SCNC: 106 MMOL/L (ref 98–107)
CO2 SERPL-SCNC: 26 MMOL/L (ref 21–32)
CREAT SERPL-MCNC: 0.78 MG/DL (ref 0.6–1)
ERYTHROCYTE [DISTWIDTH] IN BLOOD BY AUTOMATED COUNT: 15.5 % (ref 11.9–14.6)
GLOBULIN SER CALC-MCNC: 4.4 G/DL (ref 2.3–3.5)
GLUCOSE SERPL-MCNC: 96 MG/DL (ref 65–100)
HCT VFR BLD AUTO: 36.3 % (ref 35.8–46.3)
HGB BLD-MCNC: 11.9 G/DL (ref 11.7–15.4)
LACTATE SERPL-SCNC: 0.7 MMOL/L (ref 0.4–2)
MCH RBC QN AUTO: 26.9 PG (ref 26.1–32.9)
MCHC RBC AUTO-ENTMCNC: 32.8 G/DL (ref 31.4–35)
MCV RBC AUTO: 82.1 FL (ref 79.6–97.8)
NRBC # BLD: 0 K/UL (ref 0–0.2)
PLATELET # BLD AUTO: 317 K/UL (ref 150–450)
PMV BLD AUTO: 10.3 FL (ref 9.4–12.3)
POTASSIUM SERPL-SCNC: 4.5 MMOL/L (ref 3.5–5.1)
PROT SERPL-MCNC: 6.7 G/DL (ref 6.3–8.2)
RBC # BLD AUTO: 4.42 M/UL (ref 4.05–5.2)
SODIUM SERPL-SCNC: 138 MMOL/L (ref 138–145)
WBC # BLD AUTO: 11.9 K/UL (ref 4.3–11.1)

## 2020-12-02 PROCEDURE — 74011000250 HC RX REV CODE- 250: Performed by: SURGERY

## 2020-12-02 PROCEDURE — 80053 COMPREHEN METABOLIC PANEL: CPT

## 2020-12-02 PROCEDURE — 74011000258 HC RX REV CODE- 258: Performed by: SURGERY

## 2020-12-02 PROCEDURE — 85027 COMPLETE CBC AUTOMATED: CPT

## 2020-12-02 PROCEDURE — 65270000029 HC RM PRIVATE

## 2020-12-02 PROCEDURE — 74011250636 HC RX REV CODE- 250/636: Performed by: SURGERY

## 2020-12-02 PROCEDURE — 83605 ASSAY OF LACTIC ACID: CPT

## 2020-12-02 PROCEDURE — 77030038269 HC DRN EXT URIN PURWCK BARD -A

## 2020-12-02 PROCEDURE — 2709999900 HC NON-CHARGEABLE SUPPLY

## 2020-12-02 PROCEDURE — 65660000000 HC RM CCU STEPDOWN

## 2020-12-02 PROCEDURE — 36415 COLL VENOUS BLD VENIPUNCTURE: CPT

## 2020-12-02 PROCEDURE — 77030040830 HC CATH URETH FOL MDII -A

## 2020-12-02 RX ADMIN — FAMOTIDINE 20 MG: 10 INJECTION INTRAVENOUS at 08:48

## 2020-12-02 RX ADMIN — PIPERACILLIN SODIUM AND TAZOBACTAM SODIUM 3.38 G: 3; .375 INJECTION, POWDER, LYOPHILIZED, FOR SOLUTION INTRAVENOUS at 16:45

## 2020-12-02 RX ADMIN — MORPHINE SULFATE 2 MG: 2 INJECTION, SOLUTION INTRAMUSCULAR; INTRAVENOUS at 03:53

## 2020-12-02 RX ADMIN — PIPERACILLIN SODIUM AND TAZOBACTAM SODIUM 3.38 G: 3; .375 INJECTION, POWDER, LYOPHILIZED, FOR SOLUTION INTRAVENOUS at 08:48

## 2020-12-02 RX ADMIN — FAMOTIDINE 20 MG: 10 INJECTION INTRAVENOUS at 21:13

## 2020-12-02 RX ADMIN — DEXTROSE MONOHYDRATE, SODIUM CHLORIDE, AND POTASSIUM CHLORIDE 100 ML/HR: 50; 4.5; 1.49 INJECTION, SOLUTION INTRAVENOUS at 01:42

## 2020-12-02 RX ADMIN — PIPERACILLIN SODIUM AND TAZOBACTAM SODIUM 3.38 G: 3; .375 INJECTION, POWDER, LYOPHILIZED, FOR SOLUTION INTRAVENOUS at 01:18

## 2020-12-02 RX ADMIN — ENOXAPARIN SODIUM 40 MG: 40 INJECTION SUBCUTANEOUS at 08:48

## 2020-12-02 RX ADMIN — MORPHINE SULFATE 2 MG: 2 INJECTION, SOLUTION INTRAMUSCULAR; INTRAVENOUS at 01:19

## 2020-12-02 RX ADMIN — MORPHINE SULFATE 2 MG: 2 INJECTION, SOLUTION INTRAMUSCULAR; INTRAVENOUS at 21:13

## 2020-12-02 RX ADMIN — MORPHINE SULFATE 2 MG: 2 INJECTION, SOLUTION INTRAMUSCULAR; INTRAVENOUS at 08:48

## 2020-12-02 RX ADMIN — MORPHINE SULFATE 2 MG: 2 INJECTION, SOLUTION INTRAMUSCULAR; INTRAVENOUS at 14:42

## 2020-12-02 RX ADMIN — MORPHINE SULFATE 2 MG: 2 INJECTION, SOLUTION INTRAMUSCULAR; INTRAVENOUS at 06:07

## 2020-12-02 NOTE — ROUTINE PROCESS
Bedside and Verbal shift change report to be given to self (oncoming nurse) by Petra Andrew RN (5th floor) (offgoing nurse). Report included the following information SBAR, Kardex and MAR. Right surgical site assessed with RN.

## 2020-12-02 NOTE — PROGRESS NOTES
H&P/Consult Note/Progress Note/Office Note:   Myriam Swan  MRN: [id number]  :1935  Age:85 y.o.    HPI: Myriam Swan is a 81 y.o. female who is s/p lap cholecystectomy for acute calculous cholecystitis on 20. She also has a large mixed hiatal and paraesophageal hernia dating back at least to 2016   from studies at [facility] where she was followed and no surgery recommended. She came to the ER on 20 complaining of a 2-day history of pain that she states was primarily involving both of her flanks radiating to her epigastric region as well as the upper chest.    She specifically says she does not think the pain started in her epigastric region or her chest.    The pain was constant and progressive. Nothing in particular made it better or worse. She had associated nausea or vomiting. She was seen in an urgent treatment center and sent to the ER for further evaluation. While in the ER she is found to be significantly hypertensive with a blood pressure 175/99. Her troponin was markedly elevated at 56.7. WBC 17.3k;  LFTs and lipase were normal.  Lactic acid was also normal at 1.0. Urinalysis  Results for April Sanchez (MRN [id number]) as of 2020 19:29   Ref. Range 2020 17:33   Epithelial cells Latest Ref Range: 0 /hpf 0   Mucus Latest Ref Range: 0 /lpf 0   WBC Latest Ref Range: 0 /hpf 0-3   RBC Latest Ref Range: 0 /hpf 0   Bacteria Latest Ref Range: 0 /hpf 0   Crystals, urine Latest Ref Range: 0 /LPF 0   Casts Latest Ref Range: 0 /lpf 0   Other observations Latest Units:   RESULTS VERIFIED MANUALLY           She has a h/o GERD and prior hysterectomy.   Her large hiatal hernia has been known since at least  and has been imaged and evaluated multiple times at [address] (now [facility])   She was evaluated at [facility] with esophagogram and CT imaging and she reports that the surgeon told her that she was not a surgical candidate for the hiatal hernia  and that her operative risks related to hiatal hernia repair were too high to proceed. She is unsure of her remote history but reports she thinks she had ovarian cancer in the 1950s   and had a hysterectomy followed by chemotherapy and radiation treatment. 10/14/16 CT chest (at [facility])  Impression:  Stable small pulmonary nodule in the right middle lobe.  Indeterminate.  Consider CT chest surveillance 6 months. No thoracic adenopathy. Moderate hiatal hernia. Gallstones. 4/23/18 CT chest (at [facility])  Impression:  Stable 5 mm nodule in the right middle lobe for 2 years. Benign in nature. No thoracic adenopathy. Large hiatal hernia. Multiple gallstones        11/27/18 esophagram (at [facility])  Large hiatal hernia. This is a mixed type hiatal hernia with the GE junction above the diaphragm, however, there is also a paraesophageal component involving the gastric fundus and body. Free GE reflux is demonstrated into the distal  2/3rds of the esophagus, however, there is no associated esophageal ulceration, erosions or mass lesion. There is no esophageal stenosis. A 13 mm diameter barium tablet passed rapidly to the esophagus to the stomach without obstruction. .    IMPRESSION:  1. Large mixed sliding and paraesophageal type hiatal hernia with a portion of the gastric fundus and gastric body in the lower chest.  2.  Extensive gastroesophageal reflux without evidence of associated esophageal ulceration or mass. .         11/27/20 portable CXR  IMPRESSION: Large hiatal hernia, grossly stable compared to prior chest x-rays.       11/27/20 EKG  NSR; Prolonged QT   Abnormal ECG   when compared with ECG of 08-DEC-2017 09:14, QT has lengthened       11/27/20 CT abd/pelvis with IV contrast  Abdomen: The lung bases show a left-sided hiatal hernia with some adjacent  compressive atelectasis.  Air-filled loop of bowel seen between the liver and the  right diaphragm, with diaphragm slips seen along the posterior lateral right  liver, the gallbladder shows multiple gallstones and is somewhat distended with  some adjacent pericholecystic fluid, the pancreas, spleen, adrenals appear  normal. Right kidney appears normal, left kidney midpole shows a 8mm 43  Hounsfield unit minimally complex cystic lesion with the midpole laterally  showing an exophytic 7 mm 15 Hounsfield unit probable simple cyst. There is no  intraperitoneal free air, nor abnormal adenopathy seen. Patient has a high  riding cecum, the appendix is not seen.     Pelvis: There are multiple diverticula within an elongated sigmoid colon, the  bladder and pelvic sidewalls appear normal, the uterus is absent. There is no  intraperitoneal free air, ascites, nor abnormal adenopathy seen.     IMPRESSION:  1. 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 2. Gallstones and some pericholecystic fluid, if cholecystitis is suspected dedicated ultrasound should be considered. 3. There does appear to be a tiny common duct stone distally all below the duct does not appear dilated with a stone measuring 3 mm. 4. Simple cyst left kidney with probable complex cyst left kidney if imaging confirmation is desired nonemergent US should be considered. 5. Multiple diverticula without evidence of active diverticulitis.       11/28/20 echo  EF estimated 60-65%. No regional wall motion abnormalities. Wall thickness was normal. Left ventricular diastolic function parameters were normal. Avg E/e': 9.28.     RIGHT VENTRICLE: The size was normal. Systolic function was normal.  Estimated peak pressure was in the range of 25-30 mmHg. LEFT ATRIUM: Size was normal.  RIGHT ATRIUM: Size was normal.    SUMMARY:  -  Left ventricle: Systolic function was normal. EF 60-65%. No regional wall motion abnormalities. -  Inferior vena cava, hepatic veins: The respirophasic change in diameter was more than 50%. -  Tricuspid valve: There was mild regurgitation.           11/28/20 US abd  FINDINGS:   LIVER: 13.7 cm. Normal echogenicity. No masses. BILE DUCTS: No intrahepatic bile duct dilatation. CBD diameter = 6 mm. GALLBLADDER: Mild gallbladder distention and multiple gallstones. No significant wall thickening. PANCREAS: Normal.  SPLEEN: Normal.     RIGHT KIDNEY: 10.1 cm. No mass or hydronephrosis. LEFT KIDNEY: 10.8 cm. 2 small renal cysts. No hydronephrosis. ABDOMINAL AORTA AND IVC: Normal in size. ASCITES: No free fluid.     IMPRESSION:   1. Distended gallbladder with multiple gallstones. ,  Concerning for early acute cholecystitis based on CT appearance. 2.  Small simple cysts in the left kidney. 11/30/20 Gastrograffin UGI  Patient swallowed barium without difficulty. Esophagus appeared normal.  Redemonstrated is a hiatal hernia. The GE junction is in normal anatomic  location with the fundus, body, antrum in a thoracic location. Enteric contrast  quickly fills the entire nondistended stomach and empties into the duodenum. After the stomach becomes progressively distended with contrast, there is  limited emptying into the duodenum. This results in moderate esophageal reflux.       Impression:   Type IV paraesophageal hernia. There is rapid transit of contrast through the intrathoracic stomach into the intra-abdominal duodenum and small bowel on initial imaging. When the stomach becomes progressively more distended with contrast, limited flow of contrast into the duodenum and small bowel is seen.          11/30/20 HIDA  Prompt and homogeneous uptake of tracer by the liver. Activity is rapidly excreted into the biliary system with no activity seen in the gallbladder by one hour. Patient was injected with 2 mg of morphine and imaged for an additional half hour without activity seen in the gallbladder.     By the end of imaging the CBD and small bowel activity are seen.       IMPRESSION:    Obstructed cystic duct which can be seen with cholecystitis. Additonal hx:  11/28/20 echo pending; IV Abx for possible cholecystitis;  WBC improving; lactic acid normal; LFTs normal; HIDA Monday to look for GB filling  11/29/20 abd pain persists; comes and goes;no SOB; no CP;  lactic acid normal; wbc coming down; UGI today; HIDA in am; cardiology thinks troponin elevation related to HTN/demand ischemia  11/30/20 Intermittent epigastric pain; WBC 9.3k; lactic acid normal   12/1/20  RUQ pain; HIDA show non-vis of GB suggestive of cholecystitis; UGI as above with no obstruction but slow emptying; OR for lap cholecystectomy today  12/2/20 POD1 LC;  complaining of inability to void and string sensation she needs to go; LFTs normal; Foster drain serosang; Wheeler today          Past Medical History:   Diagnosis Date    Gastrointestinal disorder     acid reflux    Hypertension      Past Surgical History:   Procedure Laterality Date    HX GYN      cervical CA, hysterectomy    HX ORTHOPAEDIC      knee     Current Facility-Administered Medications   Medication Dose Route Frequency    dextrose 5% - 0.45% NaCl with KCl 20 mEq/L infusion  100 mL/hr IntraVENous CONTINUOUS    enoxaparin (LOVENOX) injection 40 mg  40 mg SubCUTAneous Q24H    morphine injection 2-4 mg  2-4 mg IntraVENous Q2H PRN    enalaprilat (VASOTEC) injection 1.25 mg  1.25 mg IntraVENous Q6H PRN    piperacillin-tazobactam (ZOSYN) 3.375 g in 0.9% sodium chloride (MBP/ADV) 100 mL MBP  3.375 g IntraVENous Q8H    ondansetron (ZOFRAN) injection 4 mg  4 mg IntraVENous Q4H PRN    famotidine (PF) (PEPCID) 20 mg in 0.9% sodium chloride 10 mL injection  20 mg IntraVENous Q12H    acetaminophen (TYLENOL) solution 1,000 mg  1,000 mg Oral Q6H PRN     Patient has no known allergies.   Social History     Socioeconomic History    Marital status:      Spouse name: Not on file    Number of children: Not on file    Years of education: Not on file    Highest education level: Not on file   Tobacco Use  Smoking status: Never Smoker    Smokeless tobacco: Never Used   Substance and Sexual Activity    Alcohol use: No    Drug use: No    Sexual activity: Never     Social History     Tobacco Use   Smoking Status Never Smoker   Smokeless Tobacco Never Used     History reviewed. No pertinent family history. ROS: The patient has no difficulty with chest pain or shortness of breath. No fever or chills. Comprehensive review of systems was otherwise unremarkable except as noted above. Physical Exam:   Visit Vitals  BP (!) 165/81   Pulse 71   Temp 99.2 °F (37.3 °C)   Resp 18   Ht 5' 3\" (1.6 m)   Wt 189 lb (85.7 kg)   SpO2 92%   BMI 33.48 kg/m²     Vitals:    12/01/20 1844 12/01/20 2045 12/02/20 0040 12/02/20 0447   BP: 139/84 (!) 143/80 128/89 (!) 165/81   Pulse: 76 77 80 71   Resp: 16 14 18 18   Temp: 98.3 °F (36.8 °C) 98 °F (36.7 °C) 99.3 °F (37.4 °C) 99.2 °F (37.3 °C)   SpO2: 96% 97% 94% 92%   Weight:    189 lb (85.7 kg)   Height:         12/01 1901 - 12/02 0700  In: -   Out: 50 [Drains:50]  11/30 0701 - 12/01 1900  In: 3200 [I.V.:3200]  Out: 1630 [Urine:1500; Drains:30]    Constitutional: Alert, oriented, cooperative patient in no acute distress; appears stated age    Eyes:Sclera are clear. EOMs intact  ENMT: no external lesions gross hearing normal; no obvious neck masses, no ear or lip lesions, nares normal  CV: RRR. Normal perfusion  Resp: No JVD. Breathing is  non-labored; no audible wheezing. GI: dressings dry and intact over trochar sites;  abd soft and non-distended; epigastric pain much better; now with pain around drain exit site and suprapubic pressure/pain with inability to void;  Foster drain serosang. Musculoskeletal: unremarkable with normal function. No embolic signs or cyanosis.    Neuro:  Oriented; moves all 4; no focal deficits  Psychiatric: normal affect and mood, no memory impairment    Recent vitals (if inpt):  Patient Vitals for the past 24 hrs:   BP Temp Pulse Resp SpO2 Height Weight   12/02/20 0447 (!) 165/81 99.2 °F (37.3 °C) 71 18 92 %  189 lb (85.7 kg)   12/02/20 0040 128/89 99.3 °F (37.4 °C) 80 18 94 %     12/01/20 2045 (!) 143/80 98 °F (36.7 °C) 77 14 97 %     12/01/20 1844 139/84 98.3 °F (36.8 °C) 76 16 96 %     12/01/20 1820 (!) 144/67  74  97 %     12/01/20 1805 (!) 150/74  77 12 96 %     12/01/20 1751 (!) 149/82  83 13 95 %     12/01/20 1744   82 15 95 %     12/01/20 1735 (!) 160/68 98.9 °F (37.2 °C) 80 12 96 %     12/01/20 1721 (!) 167/77  86 15 98 %     12/01/20 1713   89 17 95 %     12/01/20 1712   89 18 91 %     12/01/20 1707   91 15 99 %     12/01/20 1705 (!) 165/79  87 20 91 %     12/01/20 1700 (!) 170/83  88 22 100 %     12/01/20 1655 (!) 170/82  91 24 97 %     12/01/20 1650 (!) 171/80  93 22 96 %     12/01/20 1646   94 25 96 %     12/01/20 1645 (!) 166/82  97 20 (!) 80 %     12/01/20 1640 (!) 167/81  95 18 97 %     12/01/20 1635 (!) 184/84  93 21 96 %     12/01/20 1632 (!) 186/89 99.3 °F (37.4 °C) 89 14 96 %     12/01/20 1631 (!) 186/89  89 20 96 %     12/01/20 1628   87  96 %     12/01/20 1404 (!) 191/84 98.6 °F (37 °C) 60 18 97 % 5' 3\" (1.6 m) 184 lb (83.5 kg)   12/01/20 1243 (!) 169/81 98.6 °F (37 °C) 62 17 95 %     12/01/20 0903 (!) 161/75 98.7 °F (37.1 °C) 62 19 95 %         Labs:  Recent Labs     12/02/20  0416   WBC 11.9*   HGB 11.9         K 4.5      CO2 26   BUN 10   CREA 0.78   GLU 96   TBILI 0.6   ALT 62          Lab Results   Component Value Date/Time    WBC 11.9 (H) 12/02/2020 04:16 AM    HGB 11.9 12/02/2020 04:16 AM    PLATELET 271 12/64/0840 04:16 AM    Sodium 138 12/02/2020 04:16 AM    Potassium 4.5 12/02/2020 04:16 AM    Chloride 106 12/02/2020 04:16 AM    CO2 26 12/02/2020 04:16 AM    BUN 10 12/02/2020 04:16 AM    Creatinine 0.78 12/02/2020 04:16 AM    Glucose 96 12/02/2020 04:16 AM    Bilirubin, total 0.6 12/02/2020 04:16 AM    ALT (SGPT) 62 12/02/2020 04:16 AM    Alk. phosphatase 111 12/02/2020 04:16 AM    Lipase 72 (L) 11/27/2020 02:01 PM       CT Results  (Last 48 hours)    None        chest X-ray      I reviewed recent labs, recent radiologic studies, and pertinent records including other doctor notes if needed. I independently reviewed radiology images for studies I described above or studies I have ordered.    Admission date (for inpatients): 11/27/2020   * No surgery found *  Procedure(s):  CHOLECYSTECTOMY LAPAROSCOPIC POSSIBLE OPEN    ASSESSMENT/PLAN:  Problem List  Date Reviewed: 12/1/2020          Codes Class Noted    Large hiatal hernia (mixed sliding and paraesophageal dating back to 2016) ICD-10-CM: K44.9  ICD-9-CM: 553.3  11/27/2020        Non-intractable vomiting with nausea ICD-10-CM: R11.2  ICD-9-CM: 787.01  11/27/2020        Epigastric pain ICD-10-CM: R10.13  ICD-9-CM: 789.06  11/27/2020        Elevated troponin ICD-10-CM: R77.8  ICD-9-CM: 790.6  11/27/2020        Uncontrolled hypertension ICD-10-CM: I10  ICD-9-CM: 401.9  11/27/2020        * (Principal) Acute calculous cholecystitis ICD-10-CM: K80.00  ICD-9-CM: 574.00  11/27/2020    Overview Signed 12/1/2020  2:45 PM by Lilibeth Wilkins MD     12/1/20 s/p lap cholecystectomy; Dr Bev Vitale             Abnormal EKG ICD-10-CM: R94.31  ICD-9-CM: 794.31  11/27/2020        Chest pain ICD-10-CM: R07.9  ICD-9-CM: 786.50  11/27/2020        Hypokalemia ICD-10-CM: E87.6  ICD-9-CM: 276.8  11/27/2020            Principal Problem:    Acute calculous cholecystitis (11/27/2020)      Overview: 12/1/20 s/p lap cholecystectomy; Dr Bev Vitale    Active Problems:    Large hiatal hernia (mixed sliding and paraesophageal dating back to 2016) (11/27/2020)      Non-intractable vomiting with nausea (11/27/2020)      Epigastric pain (11/27/2020)      Elevated troponin (11/27/2020)      Uncontrolled hypertension (11/27/2020)      Abnormal EKG (11/27/2020)      Chest pain (11/27/2020)      Hypokalemia (11/27/2020)           Acute calculous cholecystitis suggested by signs, symptoms, and HIDA scan results  She is s/p lap cholecystectomy for severe acute calculous cholecystitis on 12/1/20. IOC not possible as a result of friable ischemic cystic duct  LFTs normal this am  Cont IV Zosyn    Start Clears when pain issues resolve      Suspected urinary retention  Wheeler today      Large Mixed Hiatal and Paraesophageal Hernia/age 85  She was not felt to be a surgical candidate for this problem when evaluated at [facility] in approximately 2016 and 2017  Her risks were felt to be prohibitive. This has not changed. No chest pain  Lactic acid has remained normal throughout hospitalization   and chest pain subsided within 8 hrs of arrival in the emergency department with NPO status. UGI shows rapid stomach emptying as long as stomach is not overdistended  She will need to take only small meals. She reports she has already figured that out on her own over the yrs and has trouble if she overeats  Will hold off on surgery for paraesophageal hernia at this time      Uncontrolled HTN, chest pain, abnormal EKG, and elevated troponin   Telemetry  Cardiology following  Echo as above in HPI  Troponin elevated x 2; Cardiology thinks related to demand ischemia and HTN  IV BP control       Bilat flank pain  Resolved soon after admission  Etiology unclear  Possibly musculoskeletal   No source for the flank component of the pain on CT or US  Urinalysis normal  Pain meds prn for symptomatic relief    Renal cysts-->No further follow-up needed for these  US shows only small simple left renal cysts    Hypokalemia-->resolved  Replaced   Maint with 20KCL      I have personally performed a face-to-face diagnostic evaluation and management  service on this patient. I have independently seen the patient. I have independently obtained the above history from the patient/family.     I have independently examined the patient with above findings. I have independently reviewed data/labs for this patient and developed the above plan of care (MDM). Signed: Naldo Vanegas.  Dona Olguin MD, FACS

## 2020-12-02 NOTE — PROGRESS NOTES
Reassessment and patient having pain to abdomen with pain level of 8 and morphine 2 mg iv given at this time for pain management. Will assess for effectiveness.  Call light in reach

## 2020-12-02 NOTE — PROGRESS NOTES
This nurse and another RN was unsuccessful in inserting Wheeler cath for patient. Will give report to oncoming nurse. Brief on patient at this time.  Call light in reach

## 2020-12-02 NOTE — PROGRESS NOTES
Patient has three small dressings to midline abdomen without any drainage at this time. Foster drain to right side and draining blood into chamber with dressing intact.  Safety measures are in place with bed alarm on and call light in reach

## 2020-12-02 NOTE — PROGRESS NOTES
Patient having abdominal pain and morphine 2 mg IV given at this time and will assess effectiveness.

## 2020-12-02 NOTE — PROGRESS NOTES
I have personally spoke with each family member including both sons Lew Ahumada and Rosalinda Brady as well as family friend Telma Britton and updated them all on the plan of care and the patients condition. I have reassured them to call at anytime should they have any other questions. No other needs at this time. Will continue to monitor.

## 2020-12-02 NOTE — PROGRESS NOTES
Received report from [facility] On license of [facility] and patient is stable.  Will assess

## 2020-12-02 NOTE — PROGRESS NOTES
Patient pain is at a 5 and will continue to assess pain levels. Safety measures in place with bed alarm on and call light in reach. Will prepare report for oncoming nurse.

## 2020-12-02 NOTE — PROGRESS NOTES
Patient having urinary retention and this nurse bladder scanned patient and patient has 545 cc of urine in bladder at this time. MD wants francois cath insertion.

## 2020-12-02 NOTE — ROUTINE PROCESS
Bedside and Verbal shift change report to be given to Kana Angeles RN (oncoming nurse) by self (offgoing nurse). Report included the following information SBAR, Kardex and MAR.

## 2020-12-02 NOTE — PROGRESS NOTES
Assessment complete and patient in bed with abdominal pain and patient provided with morphine 4 mg IV at this time for pain management. Patient family at bedside at this time for support. Patient has pure wick on with clear yellow urine draining into canister. Safety measures in place with call light in reach. This nurse will assess for medication effectiveness.

## 2020-12-02 NOTE — PROGRESS NOTES
Reassessment complete and patient resting in bed with no pain or distress noted.  Will continue to assess

## 2020-12-03 LAB
ALBUMIN SERPL-MCNC: 2.1 G/DL (ref 3.2–4.6)
ALBUMIN/GLOB SERPL: 0.5 {RATIO} (ref 1.2–3.5)
ALP SERPL-CCNC: 93 U/L (ref 50–136)
ALT SERPL-CCNC: 48 U/L (ref 12–65)
ANION GAP SERPL CALC-SCNC: 6 MMOL/L (ref 7–16)
AST SERPL-CCNC: 30 U/L (ref 15–37)
BILIRUB SERPL-MCNC: 0.6 MG/DL (ref 0.2–1.1)
BUN SERPL-MCNC: 9 MG/DL (ref 8–23)
CALCIUM SERPL-MCNC: 8.5 MG/DL (ref 8.3–10.4)
CHLORIDE SERPL-SCNC: 107 MMOL/L (ref 98–107)
CO2 SERPL-SCNC: 27 MMOL/L (ref 21–32)
CREAT SERPL-MCNC: 0.73 MG/DL (ref 0.6–1)
ERYTHROCYTE [DISTWIDTH] IN BLOOD BY AUTOMATED COUNT: 15.2 % (ref 11.9–14.6)
GLOBULIN SER CALC-MCNC: 4.1 G/DL (ref 2.3–3.5)
GLUCOSE SERPL-MCNC: 87 MG/DL (ref 65–100)
HCT VFR BLD AUTO: 33.3 % (ref 35.8–46.3)
HGB BLD-MCNC: 10.9 G/DL (ref 11.7–15.4)
LACTATE SERPL-SCNC: 0.6 MMOL/L (ref 0.4–2)
MCH RBC QN AUTO: 26.9 PG (ref 26.1–32.9)
MCHC RBC AUTO-ENTMCNC: 32.7 G/DL (ref 31.4–35)
MCV RBC AUTO: 82.2 FL (ref 79.6–97.8)
NRBC # BLD: 0 K/UL (ref 0–0.2)
PLATELET # BLD AUTO: 313 K/UL (ref 150–450)
PMV BLD AUTO: 10.1 FL (ref 9.4–12.3)
POTASSIUM SERPL-SCNC: 3.9 MMOL/L (ref 3.5–5.1)
PROT SERPL-MCNC: 6.2 G/DL (ref 6.3–8.2)
RBC # BLD AUTO: 4.05 M/UL (ref 4.05–5.2)
SODIUM SERPL-SCNC: 140 MMOL/L (ref 136–145)
WBC # BLD AUTO: 9.4 K/UL (ref 4.3–11.1)

## 2020-12-03 PROCEDURE — 36415 COLL VENOUS BLD VENIPUNCTURE: CPT

## 2020-12-03 PROCEDURE — 85027 COMPLETE CBC AUTOMATED: CPT

## 2020-12-03 PROCEDURE — 74011000302 HC RX REV CODE- 302: Performed by: SURGERY

## 2020-12-03 PROCEDURE — 74011000258 HC RX REV CODE- 258: Performed by: SURGERY

## 2020-12-03 PROCEDURE — 65660000000 HC RM CCU STEPDOWN

## 2020-12-03 PROCEDURE — 74011000250 HC RX REV CODE- 250: Performed by: SURGERY

## 2020-12-03 PROCEDURE — 74011250637 HC RX REV CODE- 250/637: Performed by: SURGERY

## 2020-12-03 PROCEDURE — 2709999900 HC NON-CHARGEABLE SUPPLY

## 2020-12-03 PROCEDURE — 65270000029 HC RM PRIVATE

## 2020-12-03 PROCEDURE — 86580 TB INTRADERMAL TEST: CPT | Performed by: SURGERY

## 2020-12-03 PROCEDURE — 74011250636 HC RX REV CODE- 250/636: Performed by: SURGERY

## 2020-12-03 PROCEDURE — 80053 COMPREHEN METABOLIC PANEL: CPT

## 2020-12-03 PROCEDURE — 83605 ASSAY OF LACTIC ACID: CPT

## 2020-12-03 RX ADMIN — DEXTROSE MONOHYDRATE, SODIUM CHLORIDE, AND POTASSIUM CHLORIDE 100 ML/HR: 50; 4.5; 1.49 INJECTION, SOLUTION INTRAVENOUS at 05:17

## 2020-12-03 RX ADMIN — ENOXAPARIN SODIUM 40 MG: 40 INJECTION SUBCUTANEOUS at 09:21

## 2020-12-03 RX ADMIN — MORPHINE SULFATE 2 MG: 2 INJECTION, SOLUTION INTRAMUSCULAR; INTRAVENOUS at 11:33

## 2020-12-03 RX ADMIN — MORPHINE SULFATE 2 MG: 2 INJECTION, SOLUTION INTRAMUSCULAR; INTRAVENOUS at 18:01

## 2020-12-03 RX ADMIN — PIPERACILLIN SODIUM AND TAZOBACTAM SODIUM 3.38 G: 3; .375 INJECTION, POWDER, LYOPHILIZED, FOR SOLUTION INTRAVENOUS at 09:21

## 2020-12-03 RX ADMIN — PIPERACILLIN SODIUM AND TAZOBACTAM SODIUM 3.38 G: 3; .375 INJECTION, POWDER, LYOPHILIZED, FOR SOLUTION INTRAVENOUS at 17:57

## 2020-12-03 RX ADMIN — FAMOTIDINE 20 MG: 10 INJECTION INTRAVENOUS at 20:53

## 2020-12-03 RX ADMIN — TUBERCULIN PURIFIED PROTEIN DERIVATIVE 5 UNITS: 5 INJECTION, SOLUTION INTRADERMAL at 17:57

## 2020-12-03 RX ADMIN — FAMOTIDINE 20 MG: 10 INJECTION INTRAVENOUS at 09:21

## 2020-12-03 RX ADMIN — MORPHINE SULFATE 2 MG: 2 INJECTION, SOLUTION INTRAMUSCULAR; INTRAVENOUS at 04:21

## 2020-12-03 RX ADMIN — MORPHINE SULFATE 2 MG: 2 INJECTION, SOLUTION INTRAMUSCULAR; INTRAVENOUS at 09:26

## 2020-12-03 RX ADMIN — ACETAMINOPHEN ORAL SOLUTION 1000 MG: 325 SOLUTION ORAL at 20:49

## 2020-12-03 RX ADMIN — PIPERACILLIN SODIUM AND TAZOBACTAM SODIUM 3.38 G: 3; .375 INJECTION, POWDER, LYOPHILIZED, FOR SOLUTION INTRAVENOUS at 00:12

## 2020-12-03 NOTE — PROGRESS NOTES
Comprehensive Nutrition Assessment    Type and Reason for Visit: Initial, NPO/clear liquid    Nutrition Recommendations/Plan:    Advance diet as medically appropriate/tolerated   If patient to remain NPO/CLD for prolonged period (7-10 days) consider nutrition support for primary needs. Please consult RD if nutrition support pursed. Nutrition Assessment:   Nutrition Background: Patient with PMH significant for gallstones, large hiatal and paraesophegeal hernias, HTN, GERD, HLD. She presented with 2 day h/o of abdoninal pain. She is now s/p lap cholecystectomy 12/1. Daily Update:  Patient seen with PCT at bedside. She states that she is doing okay. Complains of abdominal pain. She cannot recall last BM, but PCT confirms last BM yesterday. When asked, patient states that she could eat something, but is not specifically hungry. Only complaint is dry mouth. She states she has been using mouth swabs.     Current Nutrition Therapies:  DIET NPO    Current Intake:   Average Meal Intake: NPO Average Supplement Intake: None ordered Additional Caloric Sources: IVF D5 1/2NS with KCl @ 100 ml/hr providing ~400 non-protein kcal    Anthropometric Measures:  Height: 5' 3\" (160 cm)  Current Body Wt: 83.4 kg (183 lb 13.8 oz), Weight source: Bed scale(12/3)  BMI: 32.6, Obese class 1 (BMI 30.0-34.9)     Ideal Body Wt: 115 lbs (52 kg), 159.9 %          Estimated Daily Nutrient Needs:  Energy (kcal): 0048-4407 (Kcal/kg(15-20), Weight Used: Current(83.4 kg (12/3)))  Protein (g): 63-83 Weight Used: ((20% kcal))  Fluid (ml/day):   (1 ml/kcal)    Nutrition Diagnosis:   · Inadequate oral intake related to altered GI function as evidenced by (s/p lap chol, NPO)    Nutrition Interventions:   Food and/or Nutrient Delivery: (Advance diet as medically appropriate)     Coordination of Nutrition Care: Continue to monitor while inpatient  Plan of Care discussed with Usha Orlando RN    Goals:      Meet at least 75% within 7 days    Nutrition Monitoring and Evaluation:      Food/Nutrient Intake Outcomes: Diet advancement/tolerance       Discharge Planning:     Too soon to determine    [address] [place], LD on 12/3/2020 at 11:33 AM  Contact: [phone]

## 2020-12-03 NOTE — ROUTINE PROCESS
Bedside and Verbal shift change report to be given to Abraham Hogan RN (oncoming nurse) by  Florentin Francois nurseDuncan. Report included the following information SBAR, Kardex, Intake/Output, MAR and Recent Results. RN to assume care of patient.

## 2020-12-03 NOTE — PROGRESS NOTES
Therapy is recommending pt for SNF. CM attempted to speak with the pt to discuss STR options, however, pt was sleeping. CM will revisit. Care Management Interventions  PCP Verified by CM:  Yes  Mode of Transport at Discharge: BLS  Transition of Care Consult (CM Consult): SNF, Discharge Planning  Discharge Durable Medical Equipment: No  Physical Therapy Consult: Yes  Occupational Therapy Consult: Yes  Current Support Network: Family Lives [place], Own Home  Confirm Follow Up Transport: Family  The Plan for Transition of Care is Related to the Following Treatment Goals : Return to baseline  Discharge Location  Discharge Placement: Skilled nursing facility

## 2020-12-03 NOTE — PROGRESS NOTES
H&P/Consult Note/Progress Note/Office Note:   Bryan Pelletier  MRN: [id number]  :1935  Age:85 y.o.    HPI: Bryan Pelletier is a 81 y.o. female who is s/p lap cholecystectomy for acute calculous cholecystitis on 20. She also has a large mixed hiatal and paraesophageal hernia dating back at least to 2016   from studies at [facility] where she was followed and no surgery recommended. She came to the ER on 20 complaining of a 2-day history of pain that she states was primarily involving both of her flanks radiating to her epigastric region as well as the upper chest.    She specifically says she does not think the pain started in her epigastric region or her chest.    The pain was constant and progressive. Nothing in particular made it better or worse. She had associated nausea or vomiting. She was seen in an urgent treatment center and sent to the ER for further evaluation. While in the ER she is found to be significantly hypertensive with a blood pressure 175/99. Her troponin was markedly elevated at 56.7. WBC 17.3k;  LFTs and lipase were normal.  Lactic acid was also normal at 1.0. Urinalysis  Results for Sai Dela Cruz (MRN [id number]) as of 2020 19:29   Ref. Range 2020 17:33   Epithelial cells Latest Ref Range: 0 /hpf 0   Mucus Latest Ref Range: 0 /lpf 0   WBC Latest Ref Range: 0 /hpf 0-3   RBC Latest Ref Range: 0 /hpf 0   Bacteria Latest Ref Range: 0 /hpf 0   Crystals, urine Latest Ref Range: 0 /LPF 0   Casts Latest Ref Range: 0 /lpf 0   Other observations Latest Units:   RESULTS VERIFIED MANUALLY           She has a h/o GERD and prior hysterectomy.   Her large hiatal hernia has been known since at least  and has been imaged and evaluated multiple times at [address] (now [facility])   She was evaluated at [facility] with esophagogram and CT imaging and she reports that the surgeon told her that she was not a surgical candidate for the hiatal hernia  and that her operative risks related to hiatal hernia repair were too high to proceed. She is unsure of her remote history but reports she thinks she had ovarian cancer in the 1950s   and had a hysterectomy followed by chemotherapy and radiation treatment. 10/14/16 CT chest (at [facility])  Impression:  Stable small pulmonary nodule in the right middle lobe.  Indeterminate.  Consider CT chest surveillance 6 months. No thoracic adenopathy. Moderate hiatal hernia. Gallstones. 4/23/18 CT chest (at [facility])  Impression:  Stable 5 mm nodule in the right middle lobe for 2 years. Benign in nature. No thoracic adenopathy. Large hiatal hernia. Multiple gallstones        11/27/18 esophagram (at [facility])  Large hiatal hernia. This is a mixed type hiatal hernia with the GE junction above the diaphragm, however, there is also a paraesophageal component involving the gastric fundus and body. Free GE reflux is demonstrated into the distal  2/3rds of the esophagus, however, there is no associated esophageal ulceration, erosions or mass lesion. There is no esophageal stenosis. A 13 mm diameter barium tablet passed rapidly to the esophagus to the stomach without obstruction. .    IMPRESSION:  1. Large mixed sliding and paraesophageal type hiatal hernia with a portion of the gastric fundus and gastric body in the lower chest.  2.  Extensive gastroesophageal reflux without evidence of associated esophageal ulceration or mass. .         11/27/20 portable CXR  IMPRESSION: Large hiatal hernia, grossly stable compared to prior chest x-rays.       11/27/20 EKG  NSR; Prolonged QT   Abnormal ECG   when compared with ECG of 08-DEC-2017 09:14, QT has lengthened       11/27/20 CT abd/pelvis with IV contrast  Abdomen: The lung bases show a left-sided hiatal hernia with some adjacent  compressive atelectasis.  Air-filled loop of bowel seen between the liver and the  right diaphragm, with diaphragm slips seen along the posterior lateral right  liver, the gallbladder shows multiple gallstones and is somewhat distended with  some adjacent pericholecystic fluid, the pancreas, spleen, adrenals appear  normal. Right kidney appears normal, left kidney midpole shows a 8mm 43  Hounsfield unit minimally complex cystic lesion with the midpole laterally  showing an exophytic 7 mm 15 Hounsfield unit probable simple cyst. There is no  intraperitoneal free air, nor abnormal adenopathy seen. Patient has a high  riding cecum, the appendix is not seen.     Pelvis: There are multiple diverticula within an elongated sigmoid colon, the  bladder and pelvic sidewalls appear normal, the uterus is absent. There is no  intraperitoneal free air, ascites, nor abnormal adenopathy seen.     IMPRESSION:  1. 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 2. Gallstones and some pericholecystic fluid, if cholecystitis is suspected dedicated ultrasound should be considered. 3. There does appear to be a tiny common duct stone distally all below the duct does not appear dilated with a stone measuring 3 mm. 4. Simple cyst left kidney with probable complex cyst left kidney if imaging confirmation is desired nonemergent US should be considered. 5. Multiple diverticula without evidence of active diverticulitis.       11/28/20 echo  EF estimated 60-65%. No regional wall motion abnormalities. Wall thickness was normal. Left ventricular diastolic function parameters were normal. Avg E/e': 9.28.     RIGHT VENTRICLE: The size was normal. Systolic function was normal.  Estimated peak pressure was in the range of 25-30 mmHg. LEFT ATRIUM: Size was normal.  RIGHT ATRIUM: Size was normal.    SUMMARY:  -  Left ventricle: Systolic function was normal. EF 60-65%. No regional wall motion abnormalities. -  Inferior vena cava, hepatic veins: The respirophasic change in diameter was more than 50%. -  Tricuspid valve: There was mild regurgitation.           11/28/20 US abd  FINDINGS:   LIVER: 13.7 cm. Normal echogenicity. No masses. BILE DUCTS: No intrahepatic bile duct dilatation. CBD diameter = 6 mm. GALLBLADDER: Mild gallbladder distention and multiple gallstones. No significant wall thickening. PANCREAS: Normal.  SPLEEN: Normal.     RIGHT KIDNEY: 10.1 cm. No mass or hydronephrosis. LEFT KIDNEY: 10.8 cm. 2 small renal cysts. No hydronephrosis. ABDOMINAL AORTA AND IVC: Normal in size. ASCITES: No free fluid.     IMPRESSION:   1. Distended gallbladder with multiple gallstones. ,  Concerning for early acute cholecystitis based on CT appearance. 2.  Small simple cysts in the left kidney. 11/30/20 Gastrograffin UGI  Patient swallowed barium without difficulty. Esophagus appeared normal.  Redemonstrated is a hiatal hernia. The GE junction is in normal anatomic  location with the fundus, body, antrum in a thoracic location. Enteric contrast  quickly fills the entire nondistended stomach and empties into the duodenum. After the stomach becomes progressively distended with contrast, there is  limited emptying into the duodenum. This results in moderate esophageal reflux.       Impression:   Type IV paraesophageal hernia. There is rapid transit of contrast through the intrathoracic stomach into the intra-abdominal duodenum and small bowel on initial imaging. When the stomach becomes progressively more distended with contrast, limited flow of contrast into the duodenum and small bowel is seen.          11/30/20 HIDA  Prompt and homogeneous uptake of tracer by the liver. Activity is rapidly excreted into the biliary system with no activity seen in the gallbladder by one hour. Patient was injected with 2 mg of morphine and imaged for an additional half hour without activity seen in the gallbladder.     By the end of imaging the CBD and small bowel activity are seen.       IMPRESSION:    Obstructed cystic duct which can be seen with cholecystitis. Additonal hx:  11/28/20 echo pending; IV Abx for possible cholecystitis;  WBC improving; lactic acid normal; LFTs normal; HIDA Monday to look for GB filling  11/29/20 abd pain persists; comes and goes;no SOB; no CP;  lactic acid normal; wbc coming down; UGI today; HIDA in am; cardiology thinks troponin elevation related to HTN/demand ischemia  11/30/20 Intermittent epigastric pain; WBC 9.3k; lactic acid normal   12/1/20  RUQ pain; HIDA show non-vis of GB suggestive of cholecystitis; UGI as above with no obstruction but slow emptying; OR for lap cholecystectomy today  12/2/20 POD1 LC;  complaining of inability to void and string sensation she needs to go; LFTs normal; Foster drain serosang; Wheeler today  12/3/20 POD2 LC; feeling better; AF WBC/LFTs normal start sips of clears        Past Medical History:   Diagnosis Date    Gastrointestinal disorder     acid reflux    Hypertension      Past Surgical History:   Procedure Laterality Date    HX GYN      cervical CA, hysterectomy    HX ORTHOPAEDIC      knee     Current Facility-Administered Medications   Medication Dose Route Frequency    dextrose 5% - 0.45% NaCl with KCl 20 mEq/L infusion  75 mL/hr IntraVENous CONTINUOUS    enoxaparin (LOVENOX) injection 40 mg  40 mg SubCUTAneous Q24H    morphine injection 2-4 mg  2-4 mg IntraVENous Q2H PRN    enalaprilat (VASOTEC) injection 1.25 mg  1.25 mg IntraVENous Q6H PRN    piperacillin-tazobactam (ZOSYN) 3.375 g in 0.9% sodium chloride (MBP/ADV) 100 mL MBP  3.375 g IntraVENous Q8H    ondansetron (ZOFRAN) injection 4 mg  4 mg IntraVENous Q4H PRN    famotidine (PF) (PEPCID) 20 mg in 0.9% sodium chloride 10 mL injection  20 mg IntraVENous Q12H    acetaminophen (TYLENOL) solution 1,000 mg  1,000 mg Oral Q6H PRN     Patient has no known allergies.   Social History     Socioeconomic History    Marital status:      Spouse name: Not on file    Number of children: Not on file    Years of education: Not on file    Highest education level: Not on file   Tobacco Use    Smoking status: Never Smoker    Smokeless tobacco: Never Used   Substance and Sexual Activity    Alcohol use: No    Drug use: No    Sexual activity: Never     Social History     Tobacco Use   Smoking Status Never Smoker   Smokeless Tobacco Never Used     History reviewed. No pertinent family history. ROS: The patient has no difficulty with chest pain or shortness of breath. No fever or chills. Comprehensive review of systems was otherwise unremarkable except as noted above. Physical Exam:   Visit Vitals  BP (!) 174/81   Pulse 61   Temp 97.8 °F (36.6 °C)   Resp 17   Ht 5' 3\" (1.6 m)   Wt 183 lb 12.8 oz (83.4 kg)   SpO2 95%   BMI 32.56 kg/m²     Vitals:    12/03/20 0438 12/03/20 0755 12/03/20 1119 12/03/20 1145   BP: (!) 160/79 (!) 163/76  (!) 174/81   Pulse: 63 (!) 58  61   Resp: 20 18 17   Temp: 98.5 °F (36.9 °C) 97.8 °F (36.6 °C)  97.8 °F (36.6 °C)   SpO2: 95% 98%  95%   Weight: 183 lb 12.8 oz (83.4 kg)      Height:   5' 3\" (1.6 m)      12/03 0701 - 12/03 1900  In: -   Out: 350 [Urine:350]  12/01 1901 - 12/03 0700  In: 100 [I.V.:100]  Out: 1910 [Urine:1800; Drains:110]    Constitutional: Alert, oriented, cooperative patient in no acute distress; appears stated age    Eyes:Sclera are clear. EOMs intact  ENMT: no external lesions gross hearing normal; no obvious neck masses, no ear or lip lesions, nares normal  CV: RRR. Normal perfusion  Resp: No JVD. Breathing is  non-labored; no audible wheezing. GI: dressings dry and intact over trochar sites;  abd soft and non-distended; epigastric pain much better;   now with pain around drain exit site;  [place] drain serosang. Musculoskeletal: unremarkable with normal function. No embolic signs or cyanosis.    Neuro:  Oriented; moves all 4; no focal deficits  Psychiatric: normal affect and mood, no memory impairment    Recent vitals (if inpt):  Patient Vitals for the past 24 hrs:   BP Temp Pulse Resp SpO2 Height Weight   12/03/20 1145 (!) 174/81 97.8 °F (36.6 °C) 61 17 95 %     12/03/20 1119      5' 3\" (1.6 m)    12/03/20 0755 (!) 163/76 97.8 °F (36.6 °C) (!) 58 18 98 %     12/03/20 0438 (!) 160/79 98.5 °F (36.9 °C) 63 20 95 %  183 lb 12.8 oz (83.4 kg)   12/03/20 0121 (!) 171/92 98.5 °F (36.9 °C) 63 20 92 %     12/02/20 2115 (!) 171/75 98.6 °F (37 °C) 70 20 92 %     12/02/20 1723 (!) 164/79 97.4 °F (36.3 °C) 68 18 97 %         Labs:  Recent Labs     12/03/20  0343   WBC 9.4   HGB 10.9*         K 3.9      CO2 27   BUN 9   CREA 0.73   GLU 87   TBILI 0.6   ALT 48   AP 93       Lab Results   Component Value Date/Time    WBC 9.4 12/03/2020 03:43 AM    HGB 10.9 (L) 12/03/2020 03:43 AM    PLATELET 287 34/01/3012 03:43 AM    Sodium 140 12/03/2020 03:43 AM    Potassium 3.9 12/03/2020 03:43 AM    Chloride 107 12/03/2020 03:43 AM    CO2 27 12/03/2020 03:43 AM    BUN 9 12/03/2020 03:43 AM    Creatinine 0.73 12/03/2020 03:43 AM    Glucose 87 12/03/2020 03:43 AM    Bilirubin, total 0.6 12/03/2020 03:43 AM    ALT (SGPT) 48 12/03/2020 03:43 AM    Alk. phosphatase 93 12/03/2020 03:43 AM    Lipase 72 (L) 11/27/2020 02:01 PM       CT Results  (Last 48 hours)    None        chest X-ray      I reviewed recent labs, recent radiologic studies, and pertinent records including other doctor notes if needed. I independently reviewed radiology images for studies I described above or studies I have ordered.    Admission date (for inpatients): 11/27/2020   * No surgery found *  Procedure(s):  CHOLECYSTECTOMY LAPAROSCOPIC POSSIBLE OPEN    ASSESSMENT/PLAN:  Problem List  Date Reviewed: 12/1/2020          Codes Class Noted    Large hiatal hernia (mixed sliding and paraesophageal dating back to 2016) ICD-10-CM: K44.9  ICD-9-CM: 553.3  11/27/2020        Non-intractable vomiting with nausea ICD-10-CM: R11.2  ICD-9-CM: 787.01  11/27/2020        Epigastric pain ICD-10-CM: R10.13  ICD-9-CM: 789.06  11/27/2020        Elevated troponin ICD-10-CM: R77.8  ICD-9-CM: 790.6  11/27/2020        Uncontrolled hypertension ICD-10-CM: I10  ICD-9-CM: 401.9  11/27/2020        * (Principal) Acute calculous cholecystitis ICD-10-CM: K80.00  ICD-9-CM: 574.00  11/27/2020    Overview Signed 12/1/2020  2:45 PM by Niya Combs MD     12/1/20 s/p lap cholecystectomy; Dr Shirley Marquez             Abnormal EKG ICD-10-CM: R94.31  ICD-9-CM: 794.31  11/27/2020        Chest pain ICD-10-CM: R07.9  ICD-9-CM: 786.50  11/27/2020        Hypokalemia ICD-10-CM: E87.6  ICD-9-CM: 276.8  11/27/2020            Principal Problem:    Acute calculous cholecystitis (11/27/2020)      Overview: 12/1/20 s/p lap cholecystectomy; Dr Shirley Marquez    Active Problems:    Large hiatal hernia (mixed sliding and paraesophageal dating back to 2016) (11/27/2020)      Non-intractable vomiting with nausea (11/27/2020)      Epigastric pain (11/27/2020)      Elevated troponin (11/27/2020)      Uncontrolled hypertension (11/27/2020)      Abnormal EKG (11/27/2020)      Chest pain (11/27/2020)      Hypokalemia (11/27/2020)           Acute calculous cholecystitis suggested by signs, symptoms, and HIDA scan results  She is s/p lap cholecystectomy for severe acute calculous cholecystitis on 12/1/20. IOC not possible as a result of friable ischemic cystic duct  LFTs normal post-op  Cont IV Zosyn    Start sips of clears      Suspected urinary retention  Wheeler out tomorrow      Large Mixed Hiatal and Paraesophageal Hernia/age 85  She was not felt to be a surgical candidate for this problem when evaluated at [facility] in approximately 2016 and 2017  Her risks were felt to be prohibitive. This has not changed. No chest pain  Lactic acid has remained normal throughout hospitalization   and chest pain subsided within 8 hrs of arrival in the emergency department with NPO status.     UGI shows rapid stomach emptying as long as stomach is not overdistended  She will need to take only small meals. She reports she has already figured that out on her own over the yrs and has trouble if she overeats  Will hold off on surgery for paraesophageal hernia at this time      Debility  Will need SNF placement  PPD  PT/OT consults    Uncontrolled HTN, chest pain, abnormal EKG, and elevated troponin   Telemetry  Cardiology following  Echo as above in HPI  Troponin elevated x 2; Cardiology thinks related to demand ischemia and HTN  IV BP control       Bilat flank pain  Resolved soon after admission  Etiology unclear  Possibly musculoskeletal   No source for the flank component of the pain on CT or US  Urinalysis normal  Pain meds prn for symptomatic relief    Renal cysts-->No further follow-up needed for these  US shows only small simple left renal cysts    Hypokalemia-->resolved  Replaced   Maint with 20KCL      I have personally performed a face-to-face diagnostic evaluation and management  service on this patient. I have independently seen the patient. I have independently obtained the above history from the patient/family. I have independently examined the patient with above findings. I have independently reviewed data/labs for this patient and developed the above plan of care (MDM). Signed: Mata Cage.  Eros Parr MD, FACS

## 2020-12-03 NOTE — ROUTINE PROCESS
Bedside and Verbal shift change report given to  (oncoming nurse) by OTIS Wilson (offgoing nurse). Report included the following information SBAR, Kardex, Intake/Output, MAR and Recent Results.

## 2020-12-03 NOTE — PROGRESS NOTES
Bedside and verbal report received from [facility], [facility]    Patient asleep in bed

## 2020-12-04 LAB
ALBUMIN SERPL-MCNC: 2 G/DL (ref 3.2–4.6)
ALBUMIN/GLOB SERPL: 0.5 {RATIO} (ref 1.2–3.5)
ALP SERPL-CCNC: 92 U/L (ref 50–136)
ALT SERPL-CCNC: 57 U/L (ref 12–65)
ANION GAP SERPL CALC-SCNC: 4 MMOL/L (ref 7–16)
AST SERPL-CCNC: 40 U/L (ref 15–37)
BILIRUB SERPL-MCNC: 0.4 MG/DL (ref 0.2–1.1)
BUN SERPL-MCNC: 7 MG/DL (ref 8–23)
CALCIUM SERPL-MCNC: 8.7 MG/DL (ref 8.3–10.4)
CHLORIDE SERPL-SCNC: 107 MMOL/L (ref 98–107)
CO2 SERPL-SCNC: 29 MMOL/L (ref 21–32)
CREAT SERPL-MCNC: 0.73 MG/DL (ref 0.6–1)
ERYTHROCYTE [DISTWIDTH] IN BLOOD BY AUTOMATED COUNT: 15.5 % (ref 11.9–14.6)
GLOBULIN SER CALC-MCNC: 3.9 G/DL (ref 2.3–3.5)
GLUCOSE SERPL-MCNC: 101 MG/DL (ref 65–100)
HCT VFR BLD AUTO: 31.1 % (ref 35.8–46.3)
HGB BLD-MCNC: 10.2 G/DL (ref 11.7–15.4)
LACTATE SERPL-SCNC: 0.6 MMOL/L (ref 0.4–2)
MCH RBC QN AUTO: 27.1 PG (ref 26.1–32.9)
MCHC RBC AUTO-ENTMCNC: 32.8 G/DL (ref 31.4–35)
MCV RBC AUTO: 82.7 FL (ref 79.6–97.8)
MM INDURATION POC: 0 MM (ref 0–5)
NRBC # BLD: 0 K/UL (ref 0–0.2)
PLATELET # BLD AUTO: 293 K/UL (ref 150–450)
PMV BLD AUTO: 10.2 FL (ref 9.4–12.3)
POTASSIUM SERPL-SCNC: 3.8 MMOL/L (ref 3.5–5.1)
PPD POC: NEGATIVE NEGATIVE
PROT SERPL-MCNC: 5.9 G/DL (ref 6.3–8.2)
RBC # BLD AUTO: 3.76 M/UL (ref 4.05–5.2)
SODIUM SERPL-SCNC: 140 MMOL/L (ref 138–145)
WBC # BLD AUTO: 7.1 K/UL (ref 4.3–11.1)

## 2020-12-04 PROCEDURE — 65660000000 HC RM CCU STEPDOWN

## 2020-12-04 PROCEDURE — 74011250636 HC RX REV CODE- 250/636: Performed by: SURGERY

## 2020-12-04 PROCEDURE — 36415 COLL VENOUS BLD VENIPUNCTURE: CPT

## 2020-12-04 PROCEDURE — 87635 SARS-COV-2 COVID-19 AMP PRB: CPT

## 2020-12-04 PROCEDURE — 74011000250 HC RX REV CODE- 250: Performed by: SURGERY

## 2020-12-04 PROCEDURE — 65270000029 HC RM PRIVATE

## 2020-12-04 PROCEDURE — 77030038269 HC DRN EXT URIN PURWCK BARD -A

## 2020-12-04 PROCEDURE — 2709999900 HC NON-CHARGEABLE SUPPLY

## 2020-12-04 PROCEDURE — 83605 ASSAY OF LACTIC ACID: CPT

## 2020-12-04 PROCEDURE — 97165 OT EVAL LOW COMPLEX 30 MIN: CPT

## 2020-12-04 PROCEDURE — 85027 COMPLETE CBC AUTOMATED: CPT

## 2020-12-04 PROCEDURE — 97112 NEUROMUSCULAR REEDUCATION: CPT

## 2020-12-04 PROCEDURE — 74011000258 HC RX REV CODE- 258: Performed by: SURGERY

## 2020-12-04 PROCEDURE — 97161 PT EVAL LOW COMPLEX 20 MIN: CPT

## 2020-12-04 PROCEDURE — 80053 COMPREHEN METABOLIC PANEL: CPT

## 2020-12-04 RX ORDER — AMLODIPINE AND BENAZEPRIL HYDROCHLORIDE 5; 20 MG/1; MG/1
1 CAPSULE ORAL DAILY
Status: DISCONTINUED | OUTPATIENT
Start: 2020-12-05 | End: 2020-12-05

## 2020-12-04 RX ORDER — HYDRALAZINE HYDROCHLORIDE 20 MG/ML
10 INJECTION INTRAMUSCULAR; INTRAVENOUS
Status: DISCONTINUED | OUTPATIENT
Start: 2020-12-04 | End: 2020-12-07 | Stop reason: HOSPADM

## 2020-12-04 RX ADMIN — PIPERACILLIN SODIUM AND TAZOBACTAM SODIUM 3.38 G: 3; .375 INJECTION, POWDER, LYOPHILIZED, FOR SOLUTION INTRAVENOUS at 15:59

## 2020-12-04 RX ADMIN — MORPHINE SULFATE 2 MG: 2 INJECTION, SOLUTION INTRAMUSCULAR; INTRAVENOUS at 10:35

## 2020-12-04 RX ADMIN — FAMOTIDINE 20 MG: 10 INJECTION INTRAVENOUS at 09:21

## 2020-12-04 RX ADMIN — PIPERACILLIN SODIUM AND TAZOBACTAM SODIUM 3.38 G: 3; .375 INJECTION, POWDER, LYOPHILIZED, FOR SOLUTION INTRAVENOUS at 09:21

## 2020-12-04 RX ADMIN — ENOXAPARIN SODIUM 40 MG: 40 INJECTION SUBCUTANEOUS at 09:21

## 2020-12-04 RX ADMIN — MORPHINE SULFATE 2 MG: 2 INJECTION, SOLUTION INTRAMUSCULAR; INTRAVENOUS at 04:59

## 2020-12-04 RX ADMIN — HYDRALAZINE HYDROCHLORIDE 10 MG: 20 INJECTION, SOLUTION INTRAMUSCULAR; INTRAVENOUS at 10:34

## 2020-12-04 RX ADMIN — FAMOTIDINE 20 MG: 10 INJECTION INTRAVENOUS at 22:27

## 2020-12-04 RX ADMIN — PIPERACILLIN SODIUM AND TAZOBACTAM SODIUM 3.38 G: 3; .375 INJECTION, POWDER, LYOPHILIZED, FOR SOLUTION INTRAVENOUS at 01:46

## 2020-12-04 RX ADMIN — MORPHINE SULFATE 2 MG: 2 INJECTION, SOLUTION INTRAMUSCULAR; INTRAVENOUS at 13:28

## 2020-12-04 NOTE — PROGRESS NOTES
Patient stated she wishes to go to [facility] @ d/c as PT/OT are recommending short term rehab. Referral sent to [facility] - will follow up after referral has been reviewed.

## 2020-12-04 NOTE — PROGRESS NOTES
Problem: Patient Education: Go to Patient Education Activity  Goal: Patient/Family Education  Description: 1. Patient will complete lower body bathing and dressing with MOD I and adaptive equipment as needed. 2. Patient will complete toileting with MOD I.   3. Patient will tolerate 25 minutes of OT treatment with 1-2 rest breaks to increase activity tolerance for ADLs. 4. Patient will complete functional transfers with SBA and adaptive equipment as needed. 5. Patient will complete functional activity while seated edge of bed with MOD I and adaptive equipment as needed. Timeframe: 7 visits     Outcome: Progressing Towards Goal      OCCUPATIONAL THERAPY: Initial Assessment, Daily Note, and AM 12/4/2020  INPATIENT: OT Visit Days: 1  Payor: Jo Quinonez / Plan: KAVITHA. [address] / Product Type: Managed Care Medicare /      NAME/AGE/GENDER: Wilton Westbrook is a 81 y.o. female   PRIMARY DIAGNOSIS:  Chest pain [R07.9] Acute calculous cholecystitis Acute calculous cholecystitis  Procedure(s) (LRB):  CHOLECYSTECTOMY LAPAROSCOPIC POSSIBLE OPEN (N/A)  3 Days Post-Op  ICD-10: Treatment Diagnosis:    · Generalized Muscle Weakness (M62.81)  · Other lack of cordination (R27.8)   Precautions/Allergies:     Patient has no known allergies. ASSESSMENT:     Ms. Delfino Lobato presents for the above diagnoses. Pt s/p lap cholecystectomy, 3 days post op. Upon arrival, pt supine in bed and agreeable to OT evaluation. Pt is alert and oriented x 4. Reports living alone in a 2-story home with 12 steps to living accommodations. At baseline, pt reports independence with ADLs and MOD I for functional mobility. Today, pt presents with deficits in overall strength, activity tolerance, ADL performance, and functional mobility. Pt transitioned to sitting edge of bed with min to mod A and verbal cues for facilitation. Static sitting balance is intact with close SBA for dynamic unsupported sitting.  Pt noted increased dizziness upon sitting edge of bed; elevated BP noted with 2 readings. Pt subsequently returned to supine in bed with BP improving to 193/101 (RN notified). Session limited d/t BP. At this time, Kae López is functioning below baseline for ADls and functional mobility. Pt would benefit from skilled OT services to address OT goals and plan of care. This section established at most recent assessment   PROBLEM LIST (Impairments causing functional limitations):  1. Decreased Strength  2. Decreased ADL/Functional Activities  3. Decreased Transfer Abilities  4. Decreased Ambulation Ability/Technique  5. Decreased Balance  6. Decreased Activity Tolerance   INTERVENTIONS PLANNED: (Benefits and precautions of occupational therapy have been discussed with the patient.)  1. Activities of daily living training  2. Adaptive equipment training  3. Balance training  4. Clothing management  5. Community reintergration  6. Donning&doffing training  7. Neuromuscular re-eduation  8. Re-evaluation  9. Therapeutic activity  10. Therapeutic exercise     TREATMENT PLAN: Frequency/Duration: Follow patient 3x/week to address above goals. Rehabilitation Potential For Stated Goals: Good     REHAB RECOMMENDATIONS (at time of discharge pending progress):    Placement: It is my opinion, based on this patient's performance to date, that Ms. Guru Angel may benefit from intensive therapy at a [address] [place] [address] after discharge due to the functional deficits listed above that are likely to improve with skilled rehabilitation and concerns that he/she may be unsafe to be unsupervised at home due to decline in ability to safely perform ADLs and functional mobility.  .  Equipment:    TBD   None at this time              OCCUPATIONAL PROFILE AND HISTORY:   History of Present Injury/Illness (Reason for Referral):  See H&P  Past Medical History/Comorbidities:   Ms. Guru Angel  has a past medical history of Gastrointestinal disorder and Hypertension. Ms. Ronald Messina  has a past surgical history that includes hx gyn and hx orthopaedic. Social History/Living Environment:   Home Environment: Private residence  One/Two Story Residence: Two story  # of Interior Steps: 12  Lift Chair Available: No  Living Alone: Yes  Support Systems: Child(ayleen)  Patient Expects to be Discharged to[de-identified] Private residence  Current DME Used/Available at Home: Cane, straight  Prior Level of Function/Work/Activity:  Independent with ADLs; MOD I for functional mobility. Personal Factors:          Sex:  female        Age:  81 y.o. Other factors that influence how disability is experienced by the patient:  Multiple co-morbidities    Number of Personal Factors/Comorbidities that affect the Plan of Care: Brief history (0):  LOW COMPLEXITY   ASSESSMENT OF OCCUPATIONAL PERFORMANCE[de-identified]   Activities of Daily Living:   Basic ADLs (From Assessment) Complex ADLs (From Assessment)   Feeding: Setup  Oral Facial Hygiene/Grooming: Setup  Bathing: Moderate assistance  Upper Body Dressing: Minimum assistance  Lower Body Dressing: Moderate assistance  Toileting: Moderate assistance Instrumental ADL  Meal Preparation: Maximum assistance  Homemaking: Maximum assistance   Grooming/Bathing/Dressing Activities of Daily Living     Cognitive Retraining  Safety/Judgement: Awareness of environment; Fall prevention                       Bed/Mat Mobility  Rolling: Minimum assistance; Moderate assistance  Supine to Sit: Minimum assistance; Moderate assistance  Sit to Supine: Moderate assistance;Assist x2  Scooting: Moderate assistance     Most Recent Physical Functioning:   Gross Assessment:  AROM: Generally decreased, functional  Strength: Generally decreased, functional  Coordination: Generally decreased, functional               Posture:  Posture (WDL): Exceptions to WDL  Posture Assessment:  Forward head, Rounded shoulders, Trunk flexion  Balance:  Sitting: Impaired  Sitting - Static: Fair (occasional)  Sitting - Dynamic: Fair (occasional) Bed Mobility:  Rolling: Minimum assistance; Moderate assistance  Supine to Sit: Minimum assistance; Moderate assistance  Sit to Supine: Moderate assistance;Assist x2  Scooting: Moderate assistance  Wheelchair Mobility:     Transfers:               Patient Vitals for the past 6 hrs:   BP BP Patient Position SpO2 O2 Flow Rate (L/min) Pulse   20 0801 (!) 187/75  94 % 2 l/min (!) 58   20 0830    2 l/min    20 1005 (!) 222/105 Sitting  2 l/min    20 1132 (!) 153/75  95 % 2 l/min 72   20 1201    2 l/min        Mental Status  Neurologic State: Alert  Orientation Level: Oriented X4  Cognition: Follows commands  Perception: Appears intact  Perseveration: No perseveration noted  Safety/Judgement: Awareness of environment, Fall prevention                          Physical Skills Involved:  1. Balance  2. Strength  3. Activity Tolerance  4. Gross Motor Control Cognitive Skills Affected (resulting in the inability to perform in a timely and safe manner): 1. none Psychosocial Skills Affected:  1. Habits/Routines  2. Environmental Adaptation   Number of elements that affect the Plan of Care: 5+:  HIGH COMPLEXITY   CLINICAL DECISION MAKIN[address] [place] [id number] AM-PAC 6 Clicks   Daily Activity Inpatient Short Form  How much help from another person does the patient currently need. .. Total A Lot A Little None   1. Putting on and taking off regular lower body clothing? [] 1   [x] 2   [] 3   [] 4   2. Bathing (including washing, rinsing, drying)? [] 1   [x] 2   [] 3   [] 4   3. Toileting, which includes using toilet, bedpan or urinal?   [] 1   [x] 2   [] 3   [] 4   4. Putting on and taking off regular upper body clothing? [] 1   [] 2   [x] 3   [] 4   5. Taking care of personal grooming such as brushing teeth? [] 1   [] 2   [x] 3   [] 4   6. Eating meals?    [] 1   [] 2   [x] 3   [] 4   © , Trustees of [address] [place] [address] [address] [address], under license to [employer]. All rights reserved      Score:  Initial: 15 Most Recent: X (Date: -- )    Interpretation of Tool:  Represents activities that are increasingly more difficult (i.e. Bed mobility, Transfers, Gait). Medical Necessity:     · Patient demonstrates good rehab potential due to higher previous functional level. Reason for Services/Other Comments:  · Patient continues to require skilled intervention due to medical complications and patient unable to attend/participate in therapy as expected. Use of outcome tool(s) and clinical judgement create a POC that gives a: LOW COMPLEXITY         TREATMENT:   (In addition to Assessment/Re-Assessment sessions the following treatments were rendered)     Pre-treatment Symptoms/Complaints:    Pain: Initial:   Pain Intensity 1: 0 /10 Post Session:  same     Neuromuscular Re-education: ( 10 minutes):  Exercise/activities per grid below to improve balance, coordination and posture. Required minimal verbal cues to promote static and dynamic balance in sitting. Braces/Orthotics/Lines/Etc:   · IV  · O2 Device: Nasal cannula  Treatment/Session Assessment:    · Response to Treatment:  Hypertensive today; (RN aware)  · Interdisciplinary Collaboration:   o Physical Therapist  o Occupational Therapist  o Registered Nurse  · After treatment position/precautions:   o Supine in bed  o Bed/Chair-wheels locked  o Bed in low position  o Call light within reach  o RN notified   · Compliance with Program/Exercises: Will assess as treatment progresses. · Recommendations/Intent for next treatment session: \"Next visit will focus on advancements to more challenging activities and reduction in assistance provided\".   Total Treatment Duration:  OT Patient Time In/Time Out  Time In: 0945  Time Out: [address]

## 2020-12-04 NOTE — PROGRESS NOTES
Nutrition Brief Note:    Diet advanced to [facility] today. Will add Ensure Clear. Nutrition to continue to follow.     [address] [place], LD on 12/4/2020 at 11:28 AM  Contact: [phone]

## 2020-12-04 NOTE — ROUTINE PROCESS
Bedside and Verbal shift change report given to Abby Coughlin RN (oncoming nurse) by  Adina cheney. Report included the following information SBAR, Kardex, Intake/Output, MAR and Recent Results.

## 2020-12-04 NOTE — PROGRESS NOTES
H&P/Consult Note/Progress Note/Office Note:   Mile Anaya  MRN: [id number]  :1935  Age:85 y.o.    HPI: Mile Anaya is a 81 y.o. female who is s/p lap cholecystectomy for acute calculous cholecystitis on 20. She also has a large mixed hiatal and paraesophageal hernia dating back at least to 2016   from studies at [facility] where she was followed and no surgery recommended. She came to the ER on 20 complaining of a 2-day history of pain that she states was primarily involving both of her flanks radiating to her epigastric region as well as the upper chest.    She specifically says she does not think the pain started in her epigastric region or her chest.    The pain was constant and progressive. Nothing in particular made it better or worse. She had associated nausea or vomiting. She was seen in an urgent treatment center and sent to the ER for further evaluation. While in the ER she is found to be significantly hypertensive with a blood pressure 175/99. Her troponin was markedly elevated at 56.7. WBC 17.3k;  LFTs and lipase were normal.  Lactic acid was also normal at 1.0. Urinalysis  Results for Katelyn Tapia (MRN [id number]) as of 2020 19:29   Ref. Range 2020 17:33   Epithelial cells Latest Ref Range: 0 /hpf 0   Mucus Latest Ref Range: 0 /lpf 0   WBC Latest Ref Range: 0 /hpf 0-3   RBC Latest Ref Range: 0 /hpf 0   Bacteria Latest Ref Range: 0 /hpf 0   Crystals, urine Latest Ref Range: 0 /LPF 0   Casts Latest Ref Range: 0 /lpf 0   Other observations Latest Units:   RESULTS VERIFIED MANUALLY           She has a h/o GERD and prior hysterectomy.   Her large hiatal hernia has been known since at least  and has been imaged and evaluated multiple times at [address] [place] [address] [facility] (now [facility])   She was evaluated at [facility] with esophagogram and CT imaging and she reports that the surgeon told her that she was not a surgical candidate for the hiatal hernia  and that her operative risks related to hiatal hernia repair were too high to proceed. She is unsure of her remote history but reports she thinks she had ovarian cancer in the 1950s   and had a hysterectomy followed by chemotherapy and radiation treatment. 10/14/16 CT chest (at [facility])  Impression:  Stable small pulmonary nodule in the right middle lobe.  Indeterminate.  Consider CT chest surveillance 6 months. No thoracic adenopathy. Moderate hiatal hernia. Gallstones. 4/23/18 CT chest (at [facility])  Impression:  Stable 5 mm nodule in the right middle lobe for 2 years. Benign in nature. No thoracic adenopathy. Large hiatal hernia. Multiple gallstones        11/27/18 esophagram (at [facility])  Large hiatal hernia. This is a mixed type hiatal hernia with the GE junction above the diaphragm, however, there is also a paraesophageal component involving the gastric fundus and body. Free GE reflux is demonstrated into the distal  2/3rds of the esophagus, however, there is no associated esophageal ulceration, erosions or mass lesion. There is no esophageal stenosis. A 13 mm diameter barium tablet passed rapidly to the esophagus to the stomach without obstruction. .    IMPRESSION:  1. Large mixed sliding and paraesophageal type hiatal hernia with a portion of the gastric fundus and gastric body in the lower chest.  2.  Extensive gastroesophageal reflux without evidence of associated esophageal ulceration or mass. .         11/27/20 portable CXR  IMPRESSION: Large hiatal hernia, grossly stable compared to prior chest x-rays.       11/27/20 EKG  NSR; Prolonged QT   Abnormal ECG   when compared with ECG of 08-DEC-2017 09:14, QT has lengthened       11/27/20 CT abd/pelvis with IV contrast  Abdomen: The lung bases show a left-sided hiatal hernia with some adjacent  compressive atelectasis.  Air-filled loop of bowel seen between the liver and the  right diaphragm, with diaphragm slips seen along the posterior lateral right  liver, the gallbladder shows multiple gallstones and is somewhat distended with  some adjacent pericholecystic fluid, the pancreas, spleen, adrenals appear  normal. Right kidney appears normal, left kidney midpole shows a 8mm 43  Hounsfield unit minimally complex cystic lesion with the midpole laterally  showing an exophytic 7 mm 15 Hounsfield unit probable simple cyst. There is no  intraperitoneal free air, nor abnormal adenopathy seen. Patient has a high  riding cecum, the appendix is not seen.     Pelvis: There are multiple diverticula within an elongated sigmoid colon, the  bladder and pelvic sidewalls appear normal, the uterus is absent. There is no  intraperitoneal free air, ascites, nor abnormal adenopathy seen.     IMPRESSION:  1. 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 2. Gallstones and some pericholecystic fluid, if cholecystitis is suspected dedicated ultrasound should be considered. 3. There does appear to be a tiny common duct stone distally all below the duct does not appear dilated with a stone measuring 3 mm. 4. Simple cyst left kidney with probable complex cyst left kidney if imaging confirmation is desired nonemergent US should be considered. 5. Multiple diverticula without evidence of active diverticulitis.       11/28/20 echo  EF estimated 60-65%. No regional wall motion abnormalities. Wall thickness was normal. Left ventricular diastolic function parameters were normal. Avg E/e': 9.28.     RIGHT VENTRICLE: The size was normal. Systolic function was normal.  Estimated peak pressure was in the range of 25-30 mmHg. LEFT ATRIUM: Size was normal.  RIGHT ATRIUM: Size was normal.    SUMMARY:  -  Left ventricle: Systolic function was normal. EF 60-65%. No regional wall motion abnormalities. -  Inferior vena cava, hepatic veins: The respirophasic change in diameter was more than 50%. -  Tricuspid valve: There was mild regurgitation.           11/28/20 US abd  FINDINGS:   LIVER: 13.7 cm. Normal echogenicity. No masses. BILE DUCTS: No intrahepatic bile duct dilatation. CBD diameter = 6 mm. GALLBLADDER: Mild gallbladder distention and multiple gallstones. No significant wall thickening. PANCREAS: Normal.  SPLEEN: Normal.     RIGHT KIDNEY: 10.1 cm. No mass or hydronephrosis. LEFT KIDNEY: 10.8 cm. 2 small renal cysts. No hydronephrosis. ABDOMINAL AORTA AND IVC: Normal in size. ASCITES: No free fluid.     IMPRESSION:   1. Distended gallbladder with multiple gallstones. ,  Concerning for early acute cholecystitis based on CT appearance. 2.  Small simple cysts in the left kidney. 11/30/20 Gastrograffin UGI  Patient swallowed barium without difficulty. Esophagus appeared normal.  Redemonstrated is a hiatal hernia. The GE junction is in normal anatomic  location with the fundus, body, antrum in a thoracic location. Enteric contrast  quickly fills the entire nondistended stomach and empties into the duodenum. After the stomach becomes progressively distended with contrast, there is  limited emptying into the duodenum. This results in moderate esophageal reflux.       Impression:   Type IV paraesophageal hernia. There is rapid transit of contrast through the intrathoracic stomach into the intra-abdominal duodenum and small bowel on initial imaging. When the stomach becomes progressively more distended with contrast, limited flow of contrast into the duodenum and small bowel is seen.          11/30/20 HIDA  Prompt and homogeneous uptake of tracer by the liver. Activity is rapidly excreted into the biliary system with no activity seen in the gallbladder by one hour. Patient was injected with 2 mg of morphine and imaged for an additional half hour without activity seen in the gallbladder.     By the end of imaging the CBD and small bowel activity are seen.       IMPRESSION:    Obstructed cystic duct which can be seen with cholecystitis. Additonal hx:  11/28/20 echo pending; IV Abx for possible cholecystitis;  WBC improving; lactic acid normal; LFTs normal; HIDA Monday to look for GB filling  11/29/20 abd pain persists; comes and goes;no SOB; no CP;  lactic acid normal; wbc coming down; UGI today; HIDA in am; cardiology thinks troponin elevation related to HTN/demand ischemia  11/30/20 Intermittent epigastric pain; WBC 9.3k; lactic acid normal   12/1/20  RUQ pain; HIDA show non-vis of GB suggestive of cholecystitis; UGI as above with no obstruction but slow emptying; OR for lap cholecystectomy today  12/2/20 POD1 LC;  complaining of inability to void and string sensation she needs to go; LFTs normal; Foster drain serosang;  [facility] today  12/3/20 POD2 LC; feeling better; AF WBC/LFTs normal start sips of clears  12/4/20 POD3 LC 1/10 incisional pain; LFTs normal clear liquid diet; await SNF placement         Past Medical History:   Diagnosis Date    Gastrointestinal disorder     acid reflux    Hypertension      Past Surgical History:   Procedure Laterality Date    HX GYN      cervical CA, hysterectomy    HX ORTHOPAEDIC      knee     Current Facility-Administered Medications   Medication Dose Route Frequency    hydrALAZINE (APRESOLINE) 20 mg/mL injection 10 mg  10 mg IntraVENous Q6H PRN    tuberculin injection 5 Units  5 Units IntraDERMal ONCE    dextrose 5% - 0.45% NaCl with KCl 20 mEq/L infusion  75 mL/hr IntraVENous CONTINUOUS    enoxaparin (LOVENOX) injection 40 mg  40 mg SubCUTAneous Q24H    morphine injection 2-4 mg  2-4 mg IntraVENous Q2H PRN    enalaprilat (VASOTEC) injection 1.25 mg  1.25 mg IntraVENous Q6H PRN    piperacillin-tazobactam (ZOSYN) 3.375 g in 0.9% sodium chloride (MBP/ADV) 100 mL MBP  3.375 g IntraVENous Q8H    ondansetron (ZOFRAN) injection 4 mg  4 mg IntraVENous Q4H PRN    famotidine (PF) (PEPCID) 20 mg in 0.9% sodium chloride 10 mL injection  20 mg IntraVENous Q12H    acetaminophen (TYLENOL) solution 1,000 mg  1,000 mg Oral Q6H PRN     Patient has no known allergies. Social History     Socioeconomic History    Marital status:      Spouse name: Not on file    Number of children: Not on file    Years of education: Not on file    Highest education level: Not on file   Tobacco Use    Smoking status: Never Smoker    Smokeless tobacco: Never Used   Substance and Sexual Activity    Alcohol use: No    Drug use: No    Sexual activity: Never     Social History     Tobacco Use   Smoking Status Never Smoker   Smokeless Tobacco Never Used     History reviewed. No pertinent family history. ROS: The patient has no difficulty with chest pain or shortness of breath. No fever or chills. Comprehensive review of systems was otherwise unremarkable except as noted above. Physical Exam:   Visit Vitals  BP (!) 222/105 (BP 1 Location: Left arm, BP Patient Position: Sitting)   Pulse (!) 58   Temp 97.9 °F (36.6 °C)   Resp 16   Ht 5' 3\" (1.6 m)   Wt 190 lb 11.2 oz (86.5 kg)   SpO2 94%   BMI 33.78 kg/m²     Vitals:    12/04/20 0106 12/04/20 0453 12/04/20 0801 12/04/20 1005   BP: 133/71 (!) 173/83 (!) 187/75 (!) 222/105   Pulse: (!) 52 63 (!) 58    Resp: 20 20 16    Temp: 97.7 °F (36.5 °C) 97.1 °F (36.2 °C) 97.9 °F (36.6 °C)    SpO2: 98% 98% 94%    Weight:  190 lb 11.2 oz (86.5 kg)     Height:         No intake/output data recorded. 12/02 1901 - 12/04 0700  In: 130 [P.O.:30; I.V.:100]  Out: 2485 [Urine:2400; Drains:85]    Constitutional: Alert, oriented, cooperative patient in no acute distress; appears stated age    Eyes:Sclera are clear. EOMs intact  ENMT: no external lesions gross hearing normal; no obvious neck masses, no ear or lip lesions, nares normal  CV: RRR. Normal perfusion  Resp: No JVD. Breathing is  non-labored; no audible wheezing.       GI: dressings dry and intact over trochar sites;  abd soft and non-distended; no epigastric pain;   now with pain around drain exit site;  [place] drain serosang. Musculoskeletal: unremarkable with normal function. No embolic signs or cyanosis. Neuro:  Oriented; moves all 4; no focal deficits  Psychiatric: normal affect and mood, no memory impairment    Recent vitals (if inpt):  Patient Vitals for the past 24 hrs:   BP Temp Pulse Resp SpO2 Weight   12/04/20 1005 (!) 222/105        12/04/20 0801 (!) 187/75 97.9 °F (36.6 °C) (!) 58 16 94 %    12/04/20 0453 (!) 173/83 97.1 °F (36.2 °C) 63 20 98 % 190 lb 11.2 oz (86.5 kg)   12/04/20 0106 133/71 97.7 °F (36.5 °C) (!) 52 20 98 %    12/03/20 2040 (!) 175/80 98.7 °F (37.1 °C) 60 17 100 %    12/03/20 1705 (!) 167/80 98 °F (36.7 °C) (!) 58 17 97 %    12/03/20 1145 (!) 174/81 97.8 °F (36.6 °C) 61 17 95 %        Labs:  Recent Labs     12/04/20  0411   WBC 7.1   HGB 10.2*         K 3.8      CO2 29   BUN 7*   CREA 0.73   *   TBILI 0.4   ALT 57   AP 92       Lab Results   Component Value Date/Time    WBC 7.1 12/04/2020 04:11 AM    HGB 10.2 (L) 12/04/2020 04:11 AM    PLATELET 143 24/78/4630 04:11 AM    Sodium 140 12/04/2020 04:11 AM    Potassium 3.8 12/04/2020 04:11 AM    Chloride 107 12/04/2020 04:11 AM    CO2 29 12/04/2020 04:11 AM    BUN 7 (L) 12/04/2020 04:11 AM    Creatinine 0.73 12/04/2020 04:11 AM    Glucose 101 (H) 12/04/2020 04:11 AM    Bilirubin, total 0.4 12/04/2020 04:11 AM    ALT (SGPT) 57 12/04/2020 04:11 AM    Alk. phosphatase 92 12/04/2020 04:11 AM    Lipase 72 (L) 11/27/2020 02:01 PM       CT Results  (Last 48 hours)    None        chest X-ray      I reviewed recent labs, recent radiologic studies, and pertinent records including other doctor notes if needed. I independently reviewed radiology images for studies I described above or studies I have ordered.    Admission date (for inpatients): 11/27/2020   * No surgery found *  Procedure(s):  CHOLECYSTECTOMY LAPAROSCOPIC POSSIBLE OPEN    ASSESSMENT/PLAN:  Problem List  Date Reviewed: 12/1/2020          Codes Class Noted Large hiatal hernia (mixed sliding and paraesophageal dating back to 2016) ICD-10-CM: K44.9  ICD-9-CM: 553.3  11/27/2020        Non-intractable vomiting with nausea ICD-10-CM: R11.2  ICD-9-CM: 787.01  11/27/2020        Epigastric pain ICD-10-CM: R10.13  ICD-9-CM: 789.06  11/27/2020        Elevated troponin ICD-10-CM: R77.8  ICD-9-CM: 790.6  11/27/2020        Uncontrolled hypertension ICD-10-CM: I10  ICD-9-CM: 401.9  11/27/2020        * (Principal) Acute calculous cholecystitis ICD-10-CM: K80.00  ICD-9-CM: 574.00  11/27/2020    Overview Signed 12/1/2020  2:45 PM by Krista Michelle MD     12/1/20 s/p lap cholecystectomy; Dr Roddy Paredes             Abnormal EKG ICD-10-CM: R94.31  ICD-9-CM: 794.31  11/27/2020        Chest pain ICD-10-CM: R07.9  ICD-9-CM: 786.50  11/27/2020        Hypokalemia ICD-10-CM: E87.6  ICD-9-CM: 276.8  11/27/2020            Principal Problem:    Acute calculous cholecystitis (11/27/2020)      Overview: 12/1/20 s/p lap cholecystectomy; Dr Roddy Paredes    Active Problems:    Large hiatal hernia (mixed sliding and paraesophageal dating back to 2016) (11/27/2020)      Non-intractable vomiting with nausea (11/27/2020)      Epigastric pain (11/27/2020)      Elevated troponin (11/27/2020)      Uncontrolled hypertension (11/27/2020)      Abnormal EKG (11/27/2020)      Chest pain (11/27/2020)      Hypokalemia (11/27/2020)           Acute calculous cholecystitis suggested by signs, symptoms, and HIDA scan results  She is s/p lap cholecystectomy for severe acute calculous cholecystitis on 12/1/20. IOC not possible as a result of friable ischemic cystic duct  LFTs normal post-op  Cont IV Zosyn    Start clears  Await SNF placement    Suspected urinary retention  [facility] out tomorrow      Large Mixed Hiatal and Paraesophageal Hernia/age 85  She was not felt to be a surgical candidate for this problem when evaluated at [facility] in approximately 2016 and 2017  Her risks were felt to be prohibitive.   This has not changed. No chest pain  Lactic acid has remained normal throughout hospitalization   and chest pain subsided within 8 hrs of arrival in the emergency department with NPO status. UGI shows rapid stomach emptying as long as stomach is not overdistended  She will need to take only small meals. She reports she has already figured that out on her own over the yrs and has trouble if she overeats        Debility  Will need SNF placement  PPD  PT/OT consults    Uncontrolled HTN, chest pain, abnormal EKG, and elevated troponin   Telemetry  Cardiology following  Echo as above in HPI  Troponin elevated x 2; Cardiology thinks related to demand ischemia and HTN  IV BP control       Bilat flank pain  Resolved soon after admission  Etiology unclear  Possibly musculoskeletal   No source for the flank component of the pain on CT or US  Urinalysis normal  Pain meds prn for symptomatic relief    Renal cysts-->No further follow-up needed for these  US shows only small simple left renal cysts    Hypokalemia-->resolved  Replaced   Maint with 20KCL      I have personally performed a face-to-face diagnostic evaluation and management  service on this patient. I have independently seen the patient. I have independently obtained the above history from the patient/family. I have independently examined the patient with above findings. I have independently reviewed data/labs for this patient and developed the above plan of care (MDM). Signed: Nkiki Oneil.  Sherwin Marinelli MD, FACS

## 2020-12-04 NOTE — PROGRESS NOTES
Problem: Mobility Impaired (Adult and Pediatric)  Goal: *Acute Goals and Plan of Care (Insert Text)  Description: LTG:  (1.)Ms. Grant Alvarez will move from supine to sit and sit to supine, scoot up and down, and roll side to side INDEPENDENTLY with bed flat within 7 treatment day(s). (2.)Ms. Grant Alvarez will transfer from bed to chair and chair to bed with MODIFIED INDEPENDENCE using the least restrictive device within 7 treatment day(s). (3.)Ms. Grant Alvarez will ambulate with SUPERVISION for 100+ feet with the least restrictive device within 7 treatment day(s). ________________________________________________________________________________________________     Outcome: Progressing Towards Goal     PHYSICAL THERAPY: Initial Assessment and AM 12/4/2020  INPATIENT: PT Visit Days : 1  Payor: [facility] MEDICARE COMPLETE / Plan: [address] / Product Type: Virtual[employer] Medicare /       NAME/AGE/GENDER: Glynn White is a 81 y.o. female   PRIMARY DIAGNOSIS: Chest pain [R07.9] Acute calculous cholecystitis Acute calculous cholecystitis  Procedure(s) (LRB):  CHOLECYSTECTOMY LAPAROSCOPIC POSSIBLE OPEN (N/A)  3 Days Post-Op  ICD-10: Treatment Diagnosis:    · Generalized Muscle Weakness (M62.81)  · Difficulty in walking, Not elsewhere classified (R26.2)  · Other abnormalities of gait and mobility (R26.89)   Precaution/Allergies:  Patient has no known allergies. ASSESSMENT:     Ms. Grant Alvarez is an 81year old female admitted from home with chest pain. She is now s/p laparoscopic cholecystectomy. At baseline she lives alone and reports independence to modified independent with mobility, ambulation, and ADLs. Pt presents in supine without complaints and is agreeable to therapy assessment, mobility. Worked on log roll technique during bed mobility to improve independence/decrease pain. Min-mod A to transfer to sitting with additional time and cueing. Worked on seated balance, posture, and coordination.  Seated mobility to address posture and LE weight bearing. Pt c/o dizziness, BP checked and 222/105. Re-check shows 230/100. Returned to supine with BP a little better, still elevated. RN notified. Felipe Forbes is functioning below baseline with mobility, balance, transfers, and activity tolerance and was limited by HTN today. Will benefit from continued therapy during hospital stay to maximize safety/independnce with mobility. Recommending rehab at [facility]. At this time, patient is appropriate for Co-treatment with physical therapy due to patient's decreased overall endurance/tolerance levels, as well as need for high level skilled assistance to complete functional transfers/mobility and functional tasks. Felipe Forbes is appropriate for a multidisciplinary co-treatment of PT and OT to address goals of both disciplines. This section established at most recent assessment   PROBLEM LIST (Impairments causing functional limitations):  1. Decreased Strength  2. Decreased Transfer Abilities  3. Decreased Ambulation Ability/Technique  4. Decreased Balance  5. Decreased Activity Tolerance   INTERVENTIONS PLANNED: (Benefits and precautions of physical therapy have been discussed with the patient.)  1. Balance Exercise  2. Bed Mobility  3. Gait Training  4. Home Exercise Program (HEP)  5. Therapeutic Activites  6. Therapeutic Exercise/Strengthening  7. Transfer Training     TREATMENT PLAN: Frequency/Duration: 3 times a week for duration of hospital stay  Rehabilitation Potential For Stated Goals: Good     REHAB RECOMMENDATIONS (at time of discharge pending progress):    Placement: It is my opinion, based on this patient's performance to date, that Ms. Verna Solitario may benefit from intensive therapy at [address] after discharge due to the functional deficits listed above that are likely to improve with skilled rehabilitation and concerns that he/she may be unsafe to be unsupervised at home due to weakness, fall risk, need for assistance with mobility. Equipment:    tbd at rehab              HISTORY:   History of Present Injury/Illness (Reason for Referral):  Per H&P, \"Olu Echavarria is a 81 y.o. 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 She came to the ER on 11/27/20 complaining of a 2-day history of pain that she states was primarily involving both of her flanks radiating to her epigastric region as well as the upper chest.    She specifically says she does not think the pain started in her epigastric region or her chest.    The pain was constant and progressive. Nothing in particular made it better or worse. She had associated nausea or vomiting. She was seen in an urgent treatment center and sent to the ER for further evaluation. While in the ER she is found to be significantly hypertensive with a blood pressure 175/99. Her troponin was markedly elevated at 56.7. WBC 17.3k;  LFTs and lipase were normal.  Lactic acid was also normal at 1.0.\"  Past Medical History/Comorbidities:   Ms. Jama Nelson  has a past medical history of Gastrointestinal disorder and Hypertension. Ms. Jama Nelson  has a past surgical history that includes hx gyn and hx orthopaedic. Social History/Living Environment:   Home Environment: Private residence  One/Two Story Residence: Two story  # of Interior Steps: 12  Lift Chair Available: No  Living Alone: Yes  Support Systems: Child(ayleen), Family member(s)  Patient Expects to be Discharged to[de-identified] Private residence  Current DME Used/Available at Home: Leonel Parish, straight, Crutches  Prior Level of Function/Work/Activity:  Lives alone. Cane or \"crutch\" at times.       Number of Personal Factors/Comorbidities that affect the Plan of Care: 1-2: MODERATE COMPLEXITY   EXAMINATION:   Most Recent Physical Functioning:   Gross Assessment:  AROM: Within functional limits  Strength: Generally decreased, functional  Coordination: Within functional limits               Posture:  Posture (WDL): Exceptions to WDL  Posture Assessment: Forward head, Rounded shoulders, Trunk flexion  Balance:  Sitting: Impaired  Sitting - Static: Fair (occasional); Good (unsupported)  Sitting - Dynamic: Fair (occasional) Bed Mobility:  Rolling: Minimum assistance; Moderate assistance  Supine to Sit: Minimum assistance; Moderate assistance  Sit to Supine: Moderate assistance  Scooting: Moderate assistance  Wheelchair Mobility:     Transfers:     Gait:            Body Structures Involved:  1. Muscles Body Functions Affected:  1. Sensory/Pain  2. Movement Related  3. Skin Related Activities and Participation Affected:  1. General Tasks and Demands  2. Mobility  3. Domestic Life  4. Community, Social and [place]   Number of elements that affect the Plan of Care: 4+: HIGH COMPLEXITY   CLINICAL PRESENTATION:   Presentation: Evolving clinical presentation with changing clinical characteristics: MODERATE COMPLEXITY   CLINICAL DECISION MAKIN [facility] Short Form  How much difficulty does the patient currently have. .. Unable A Lot A Little None   1. Turning over in bed (including adjusting bedclothes, sheets and blankets)? [] 1   [] 2   [x] 3   [] 4   2. Sitting down on and standing up from a chair with arms ( e.g., wheelchair, bedside commode, etc.)   [] 1   [x] 2   [] 3   [] 4   3. Moving from lying on back to sitting on the side of the bed? [] 1   [x] 2   [] 3   [] 4   How much help from another person does the patient currently need. .. Total A Lot A Little None   4. Moving to and from a bed to a chair (including a wheelchair)? [] 1   [x] 2   [] 3   [] 4   5. Need to walk in hospital room? [] 1   [x] 2   [] 3   [] 4   6. Climbing 3-5 steps with a railing?    [x] 1   [] 2   [] 3   [] 4   © , Trustees of [address] [place] [address] [address] [id number], under license to [employer]. All rights reserved      Score:  Initial: 12 Most Recent: X (Date: -- )    Interpretation of Tool:  Represents activities that are increasingly more difficult (i.e. Bed mobility, Transfers, Gait). Medical Necessity:     · Patient demonstrates good rehab potential due to higher previous functional level. Reason for Services/Other Comments:  · Patient continues to demonstrate capacity to improve strength, balance, and activity tolerance which will increase independence, decrease amount of assistance required from caregiver and increase safety. Use of outcome tool(s) and clinical judgement create a POC that gives a: Questionable prediction of patient's progress: MODERATE COMPLEXITY            TREATMENT:   (In addition to Assessment/Re-Assessment sessions the following treatments were rendered)   Pre-treatment Symptoms/Complaints:  \"thank you\"  Pain: Initial:   Pain Intensity 1: 0  Post Session:  0/10         Neuromuscular Re-education: ( 8 Minutes):  Exercise/activities per grid below to improve balance, coordination and posture. Required minimal to moderate assistance and verbal, manual and tactile cues to promote static and dynamic balance in standing and promote motor control of bilateral, upper extremity(s), lower extremity(s) and trunk. Today's treatment session addressed Decreased Strength, Decreased Transfer Abilities, Decreased Ambulation Ability/Technique, Decreased Balance and Decreased Activity Tolerance to progress towards achieving goal(s) 1, 2 and 3. During this session, Occupational Therapy addressed ADLs to progress towards their discipline specific goal(s). Co-treatment was necessary to improve patient's ability to follow higher level commands, ability to increase activity demands and ability to return to normal functional activity.           Braces/Orthotics/Lines/Etc:   · drain elin  · O2 Device: Nasal cannula  Treatment/Session Assessment:    · Response to Treatment:  Pt performs mobility with min-mod A  · Interdisciplinary Collaboration:   o Physical Therapist  o Occupational Therapist  o Registered Nurse  · After treatment position/precautions:   o Supine in bed  o Bed/Chair-wheels locked  o Bed in low position  o Call light within reach  o RN notified   · Compliance with Program/Exercises: Will assess as treatment progresses  · Recommendations/Intent for next treatment session: \"Next visit will focus on advancements to more challenging activities and reduction in assistance provided\".   Total Treatment Duration:  PT Patient Time In/Time Out  Time In: 0926  [facility]: [facility] [place] [place]

## 2020-12-04 NOTE — ROUTINE PROCESS
Bedside and Verbal shift change report given to [address], RN (oncoming nurse) by  Arturo cheney. Report included the following information SBAR, Kardex, Intake/Output, MAR and Recent Results.

## 2020-12-05 LAB
ANION GAP SERPL CALC-SCNC: 6 MMOL/L (ref 7–16)
BUN SERPL-MCNC: 6 MG/DL (ref 8–23)
CALCIUM SERPL-MCNC: 8.6 MG/DL (ref 8.3–10.4)
CHLORIDE SERPL-SCNC: 108 MMOL/L (ref 98–107)
CO2 SERPL-SCNC: 27 MMOL/L (ref 21–32)
CREAT SERPL-MCNC: 0.73 MG/DL (ref 0.6–1)
ERYTHROCYTE [DISTWIDTH] IN BLOOD BY AUTOMATED COUNT: 15.5 % (ref 11.9–14.6)
GLUCOSE SERPL-MCNC: 104 MG/DL (ref 65–100)
HCT VFR BLD AUTO: 33 % (ref 35.8–46.3)
HGB BLD-MCNC: 10.5 G/DL (ref 11.7–15.4)
MCH RBC QN AUTO: 26.4 PG (ref 26.1–32.9)
MCHC RBC AUTO-ENTMCNC: 31.8 G/DL (ref 31.4–35)
MCV RBC AUTO: 82.9 FL (ref 79.6–97.8)
MM INDURATION POC: 0 MM (ref 0–5)
NRBC # BLD: 0 K/UL (ref 0–0.2)
PLATELET # BLD AUTO: 352 K/UL (ref 150–450)
PMV BLD AUTO: 10.3 FL (ref 9.4–12.3)
POTASSIUM SERPL-SCNC: 3.6 MMOL/L (ref 3.5–5.1)
PPD POC: NEGATIVE NEGATIVE
RBC # BLD AUTO: 3.98 M/UL (ref 4.05–5.2)
SARS COV-2, XPGCVT: NEGATIVE
SODIUM SERPL-SCNC: 141 MMOL/L (ref 136–145)
SOURCE, COVRS: NORMAL
WBC # BLD AUTO: 7.3 K/UL (ref 4.3–11.1)

## 2020-12-05 PROCEDURE — 74011000250 HC RX REV CODE- 250: Performed by: SURGERY

## 2020-12-05 PROCEDURE — 65270000029 HC RM PRIVATE

## 2020-12-05 PROCEDURE — 80048 BASIC METABOLIC PNL TOTAL CA: CPT

## 2020-12-05 PROCEDURE — 85027 COMPLETE CBC AUTOMATED: CPT

## 2020-12-05 PROCEDURE — 74011250637 HC RX REV CODE- 250/637: Performed by: NURSE PRACTITIONER

## 2020-12-05 PROCEDURE — 74011000258 HC RX REV CODE- 258: Performed by: SURGERY

## 2020-12-05 PROCEDURE — 2709999900 HC NON-CHARGEABLE SUPPLY

## 2020-12-05 PROCEDURE — 36415 COLL VENOUS BLD VENIPUNCTURE: CPT

## 2020-12-05 PROCEDURE — 74011250636 HC RX REV CODE- 250/636: Performed by: SURGERY

## 2020-12-05 RX ORDER — OLMESARTAN MEDOXOMIL 20 MG/1
40 TABLET ORAL DAILY
COMMUNITY
End: 2021-02-25

## 2020-12-05 RX ORDER — OLMESARTAN MEDOXOMIL 20 MG/1
20 TABLET ORAL DAILY
Status: DISCONTINUED | OUTPATIENT
Start: 2020-12-05 | End: 2020-12-07 | Stop reason: HOSPADM

## 2020-12-05 RX ORDER — AMLODIPINE BESYLATE 5 MG/1
5 TABLET ORAL DAILY
Status: DISCONTINUED | OUTPATIENT
Start: 2020-12-05 | End: 2020-12-07 | Stop reason: HOSPADM

## 2020-12-05 RX ORDER — AMLODIPINE BESYLATE 5 MG/1
5 TABLET ORAL DAILY
COMMUNITY

## 2020-12-05 RX ADMIN — DEXTROSE MONOHYDRATE, SODIUM CHLORIDE, AND POTASSIUM CHLORIDE 30 ML/HR: 50; 4.5; 1.49 INJECTION, SOLUTION INTRAVENOUS at 05:52

## 2020-12-05 RX ADMIN — HYDRALAZINE HYDROCHLORIDE 10 MG: 20 INJECTION, SOLUTION INTRAMUSCULAR; INTRAVENOUS at 05:01

## 2020-12-05 RX ADMIN — MORPHINE SULFATE 2 MG: 2 INJECTION, SOLUTION INTRAMUSCULAR; INTRAVENOUS at 12:51

## 2020-12-05 RX ADMIN — OLMESARTAN MEDOXOMIL 20 MG: 5 TABLET, FILM COATED ORAL at 12:30

## 2020-12-05 RX ADMIN — MORPHINE SULFATE 2 MG: 2 INJECTION, SOLUTION INTRAMUSCULAR; INTRAVENOUS at 17:12

## 2020-12-05 RX ADMIN — PIPERACILLIN SODIUM AND TAZOBACTAM SODIUM 3.38 G: 3; .375 INJECTION, POWDER, LYOPHILIZED, FOR SOLUTION INTRAVENOUS at 16:07

## 2020-12-05 RX ADMIN — ENOXAPARIN SODIUM 40 MG: 40 INJECTION SUBCUTANEOUS at 09:05

## 2020-12-05 RX ADMIN — PIPERACILLIN SODIUM AND TAZOBACTAM SODIUM 3.38 G: 3; .375 INJECTION, POWDER, LYOPHILIZED, FOR SOLUTION INTRAVENOUS at 09:05

## 2020-12-05 RX ADMIN — PIPERACILLIN SODIUM AND TAZOBACTAM SODIUM 3.38 G: 3; .375 INJECTION, POWDER, LYOPHILIZED, FOR SOLUTION INTRAVENOUS at 00:02

## 2020-12-05 RX ADMIN — AMLODIPINE BESYLATE 5 MG: 5 TABLET ORAL at 12:30

## 2020-12-05 RX ADMIN — MORPHINE SULFATE 2 MG: 2 INJECTION, SOLUTION INTRAMUSCULAR; INTRAVENOUS at 04:09

## 2020-12-05 RX ADMIN — FAMOTIDINE 20 MG: 10 INJECTION INTRAVENOUS at 09:05

## 2020-12-05 RX ADMIN — FAMOTIDINE 20 MG: 10 INJECTION INTRAVENOUS at 21:00

## 2020-12-05 NOTE — PROGRESS NOTES
H&P/Consult Note/Progress Note/Office Note:   Sugar Mcdaniels  MRN: [id number]  :1935  Age:85 y.o.    HPI: Sugar Mcdaniels is a 81 y.o. female who is s/p lap cholecystectomy for acute calculous cholecystitis on 20. She also has a large mixed hiatal and paraesophageal hernia dating back at least to 2016   from studies at [facility] where she was followed and no surgery recommended. She came to the ER on 20 complaining of a 2-day history of pain that she states was primarily involving both of her flanks radiating to her epigastric region as well as the upper chest.    She specifically says she does not think the pain started in her epigastric region or her chest.    The pain was constant and progressive. Nothing in particular made it better or worse. She had associated nausea or vomiting. She was seen in an urgent treatment center and sent to the ER for further evaluation. While in the ER she is found to be significantly hypertensive with a blood pressure 175/99. Her troponin was markedly elevated at 56.7. WBC 17.3k;  LFTs and lipase were normal.  Lactic acid was also normal at 1.0. Urinalysis  Results for Cong Caceres (MRN [id number]) as of 2020 19:29   Ref. Range 2020 17:33   Epithelial cells Latest Ref Range: 0 /hpf 0   Mucus Latest Ref Range: 0 /lpf 0   WBC Latest Ref Range: 0 /hpf 0-3   RBC Latest Ref Range: 0 /hpf 0   Bacteria Latest Ref Range: 0 /hpf 0   Crystals, urine Latest Ref Range: 0 /LPF 0   Casts Latest Ref Range: 0 /lpf 0   Other observations Latest Units:   RESULTS VERIFIED MANUALLY           She has a h/o GERD and prior hysterectomy.   Her large hiatal hernia has been known since at least  and has been imaged and evaluated multiple times at [facility] (now [facility])   She was evaluated at [facility] with esophagogram and CT imaging and she reports that the surgeon told her that she was not a surgical candidate for the hiatal hernia  and that her operative risks related to hiatal hernia repair were too high to proceed. She is unsure of her remote history but reports she thinks she had ovarian cancer in the 1950s   and had a hysterectomy followed by chemotherapy and radiation treatment. 10/14/16 CT chest (at [facility])  Impression:  Stable small pulmonary nodule in the right middle lobe.  Indeterminate.  Consider CT chest surveillance 6 months. No thoracic adenopathy. Moderate hiatal hernia. Gallstones. 4/23/18 CT chest (at [facility])  Impression:  Stable 5 mm nodule in the right middle lobe for 2 years. Benign in nature. No thoracic adenopathy. Large hiatal hernia. Multiple gallstones        11/27/18 esophagram (at [facility])  Large hiatal hernia. This is a mixed type hiatal hernia with the GE junction above the diaphragm, however, there is also a paraesophageal component involving the gastric fundus and body. Free GE reflux is demonstrated into the distal  2/3rds of the esophagus, however, there is no associated esophageal ulceration, erosions or mass lesion. There is no esophageal stenosis. A 13 mm diameter barium tablet passed rapidly to the esophagus to the stomach without obstruction. .    IMPRESSION:  1. Large mixed sliding and paraesophageal type hiatal hernia with a portion of the gastric fundus and gastric body in the lower chest.  2.  Extensive gastroesophageal reflux without evidence of associated esophageal ulceration or mass. .         11/27/20 portable CXR  IMPRESSION: Large hiatal hernia, grossly stable compared to prior chest x-rays.       11/27/20 EKG  NSR; Prolonged QT   Abnormal ECG   when compared with ECG of 08-DEC-2017 09:14, QT has lengthened       11/27/20 CT abd/pelvis with IV contrast  Abdomen: The lung bases show a left-sided hiatal hernia with some adjacent  compressive atelectasis.  Air-filled loop of bowel seen between the liver and the  right diaphragm, with diaphragm slips seen along the posterior lateral right  liver, the gallbladder shows multiple gallstones and is somewhat distended with  some adjacent pericholecystic fluid, the pancreas, spleen, adrenals appear  normal. Right kidney appears normal, left kidney midpole shows a 8mm 43  Hounsfield unit minimally complex cystic lesion with the midpole laterally  showing an exophytic 7 mm 15 Hounsfield unit probable simple cyst. There is no  intraperitoneal free air, nor abnormal adenopathy seen. Patient has a high  riding cecum, the appendix is not seen.     Pelvis: There are multiple diverticula within an elongated sigmoid colon, the  bladder and pelvic sidewalls appear normal, the uterus is absent. There is no  intraperitoneal free air, ascites, nor abnormal adenopathy seen.     IMPRESSION:  1. 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 2. Gallstones and some pericholecystic fluid, if cholecystitis is suspected dedicated ultrasound should be considered. 3. There does appear to be a tiny common duct stone distally all below the duct does not appear dilated with a stone measuring 3 mm. 4. Simple cyst left kidney with probable complex cyst left kidney if imaging confirmation is desired nonemergent US should be considered. 5. Multiple diverticula without evidence of active diverticulitis.       11/28/20 echo  EF estimated 60-65%. No regional wall motion abnormalities. Wall thickness was normal. Left ventricular diastolic function parameters were normal. Avg E/e': 9.28.     RIGHT VENTRICLE: The size was normal. Systolic function was normal.  Estimated peak pressure was in the range of 25-30 mmHg. LEFT ATRIUM: Size was normal.  RIGHT ATRIUM: Size was normal.    SUMMARY:  -  Left ventricle: Systolic function was normal. EF 60-65%. No regional wall motion abnormalities. -  Inferior vena cava, hepatic veins: The respirophasic change in diameter was more than 50%. -  Tricuspid valve: There was mild regurgitation.           11/28/20 US abd  FINDINGS:   LIVER: 13.7 cm. Normal echogenicity. No masses. BILE DUCTS: No intrahepatic bile duct dilatation. CBD diameter = 6 mm. GALLBLADDER: Mild gallbladder distention and multiple gallstones. No significant wall thickening. PANCREAS: Normal.  SPLEEN: Normal.     RIGHT KIDNEY: 10.1 cm. No mass or hydronephrosis. LEFT KIDNEY: 10.8 cm. 2 small renal cysts. No hydronephrosis. ABDOMINAL AORTA AND IVC: Normal in size. ASCITES: No free fluid.     IMPRESSION:   1. Distended gallbladder with multiple gallstones. ,  Concerning for early acute cholecystitis based on CT appearance. 2.  Small simple cysts in the left kidney. 11/30/20 Gastrograffin UGI  Patient swallowed barium without difficulty. Esophagus appeared normal.  Redemonstrated is a hiatal hernia. The GE junction is in normal anatomic  location with the fundus, body, antrum in a thoracic location. Enteric contrast  quickly fills the entire nondistended stomach and empties into the duodenum. After the stomach becomes progressively distended with contrast, there is  limited emptying into the duodenum. This results in moderate esophageal reflux.       Impression:   Type IV paraesophageal hernia. There is rapid transit of contrast through the intrathoracic stomach into the intra-abdominal duodenum and small bowel on initial imaging. When the stomach becomes progressively more distended with contrast, limited flow of contrast into the duodenum and small bowel is seen.          11/30/20 HIDA  Prompt and homogeneous uptake of tracer by the liver. Activity is rapidly excreted into the biliary system with no activity seen in the gallbladder by one hour. Patient was injected with 2 mg of morphine and imaged for an additional half hour without activity seen in the gallbladder.     By the end of imaging the CBD and small bowel activity are seen.       IMPRESSION:    Obstructed cystic duct which can be seen with cholecystitis. Additonal hx:  11/28/20 echo pending; IV Abx for possible cholecystitis;  WBC improving; lactic acid normal; LFTs normal; HIDA Monday to look for GB filling  11/29/20 abd pain persists; comes and goes;no SOB; no CP;  lactic acid normal; wbc coming down; UGI today; HIDA in am; cardiology thinks troponin elevation related to HTN/demand ischemia  11/30/20 Intermittent epigastric pain; WBC 9.3k; lactic acid normal   12/1/20  RUQ pain; HIDA show non-vis of GB suggestive of cholecystitis; UGI as above with no obstruction but slow emptying; OR for lap cholecystectomy today  12/2/20 POD1 LC;  complaining of inability to void and string sensation she needs to go; LFTs normal; Foster drain serosang;  Wheeler today  12/3/20 POD2 LC; feeling better; AF WBC/LFTs normal start sips of clears  12/4/20 POD3 LC 1/10 incisional pain; LFTs normal clear liquid diet; await SNF placement  12/5/20 POD4 LC 1/10 incisional pain; LFTs normal clear liquid diet; await SNF placement         Past Medical History:   Diagnosis Date    Gastrointestinal disorder     acid reflux    Hypertension      Past Surgical History:   Procedure Laterality Date    HX GYN      cervical CA, hysterectomy    HX ORTHOPAEDIC      knee     Current Facility-Administered Medications   Medication Dose Route Frequency    amLODIPine (NORVASC) tablet 5 mg  5 mg Oral DAILY    olmesartan (BENICAR) tablet 20 mg  20 mg Oral DAILY    hydrALAZINE (APRESOLINE) 20 mg/mL injection 10 mg  10 mg IntraVENous Q6H PRN    dextrose 5% - 0.45% NaCl with KCl 20 mEq/L infusion  30 mL/hr IntraVENous CONTINUOUS    enoxaparin (LOVENOX) injection 40 mg  40 mg SubCUTAneous Q24H    morphine injection 2-4 mg  2-4 mg IntraVENous Q2H PRN    enalaprilat (VASOTEC) injection 1.25 mg  1.25 mg IntraVENous Q6H PRN    piperacillin-tazobactam (ZOSYN) 3.375 g in 0.9% sodium chloride (MBP/ADV) 100 mL MBP  3.375 g IntraVENous Q8H    ondansetron (ZOFRAN) injection 4 mg  4 mg IntraVENous Q4H PRN    famotidine (PF) (PEPCID) 20 mg in 0.9% sodium chloride 10 mL injection  20 mg IntraVENous Q12H    acetaminophen (TYLENOL) solution 1,000 mg  1,000 mg Oral Q6H PRN     Patient has no known allergies. Social History     Socioeconomic History    Marital status:      Spouse name: Not on file    Number of children: Not on file    Years of education: Not on file    Highest education level: Not on file   Tobacco Use    Smoking status: Never Smoker    Smokeless tobacco: Never Used   Substance and Sexual Activity    Alcohol use: No    Drug use: No    Sexual activity: Never     Social History     Tobacco Use   Smoking Status Never Smoker   Smokeless Tobacco Never Used     History reviewed. No pertinent family history. ROS: The patient has no difficulty with chest pain or shortness of breath. No fever or chills. Comprehensive review of systems was otherwise unremarkable except as noted above. Physical Exam:   Visit Vitals  BP (!) 170/81 (BP 1 Location: Left arm, BP Patient Position: At rest)   Pulse 70   Temp 99 °F (37.2 °C)   Resp 18   Ht 5' 3\" (1.6 m)   Wt 189 lb 9.6 oz (86 kg)   SpO2 96%   BMI 33.59 kg/m²     Vitals:    12/05/20 0500 12/05/20 0501 12/05/20 0530 12/05/20 0808   BP: (!) 186/81 (!) 186/81 (!) 154/71 (!) 170/81   Pulse:  67  70   Resp:    18   Temp:    99 °F (37.2 °C)   SpO2:    96%   Weight:       Height:         12/05 0701 - 12/05 1900  In: -   Out: 500 [Urine:475; Drains:25]  12/03 1901 - 12/05 0700  In: 390 [P.O.:30; I.V.:360]  Out: 2470 [Urine:2400; Drains:70]    Constitutional: Alert, oriented, cooperative patient in no acute distress; appears stated age    Eyes: Sclera are clear. EOMs intact  ENMT: no external lesions gross hearing normal; no obvious neck masses, no ear or lip lesions, nares normal  CV: RRR. Normal perfusion  Resp: No JVD. Breathing is  non-labored; no audible wheezing.       GI: dressings dry and intact over trochar sites;  abd soft and non-distended; no epigastric pain;   now with pain around drain exit site;  Margy Guankao drain serosang. Musculoskeletal: unremarkable with normal function. No embolic signs or cyanosis. Neuro:  Oriented; moves all 4; no focal deficits  Psychiatric: normal affect and mood, no memory impairment    Recent vitals (if inpt):  Patient Vitals for the past 24 hrs:   BP Temp Pulse Resp SpO2 Weight   12/05/20 0808 (!) 170/81 99 °F (37.2 °C) 70 18 96 %    12/05/20 0530 (!) 154/71        12/05/20 0501 (!) 186/81  67      12/05/20 0500 (!) 186/81        12/05/20 0353 (!) 179/88 98.3 °F (36.8 °C) 65 22 95 % 189 lb 9.6 oz (86 kg)   12/05/20 0015 (!) 154/74 98.9 °F (37.2 °C) 64 18 96 %    12/04/20 2050 (!) 150/79 98.2 °F (36.8 °C) 67 16 94 %    12/04/20 1656 (!) 174/92 99.3 °F (37.4 °C) 78 18 99 %    12/04/20 1132 (!) 153/75 99.1 °F (37.3 °C) 72 17 95 %        Labs:  Recent Labs     12/05/20  0438 12/04/20  0411   WBC 7.3 7.1   HGB 10.5* 10.2*    293    140   K 3.6 3.8   * 107   CO2 27 29   BUN 6* 7*   CREA 0.73 0.73   * 101*   TBILI  --  0.4   ALT  --  57   AP  --  92       Lab Results   Component Value Date/Time    WBC 7.3 12/05/2020 04:38 AM    HGB 10.5 (L) 12/05/2020 04:38 AM    PLATELET 006 16/08/7775 04:38 AM    Sodium 141 12/05/2020 04:38 AM    Potassium 3.6 12/05/2020 04:38 AM    Chloride 108 (H) 12/05/2020 04:38 AM    CO2 27 12/05/2020 04:38 AM    BUN 6 (L) 12/05/2020 04:38 AM    Creatinine 0.73 12/05/2020 04:38 AM    Glucose 104 (H) 12/05/2020 04:38 AM    Bilirubin, total 0.4 12/04/2020 04:11 AM    ALT (SGPT) 57 12/04/2020 04:11 AM    Alk. phosphatase 92 12/04/2020 04:11 AM    Lipase 72 (L) 11/27/2020 02:01 PM       CT Results  (Last 48 hours)    None        chest X-ray      I reviewed recent labs, recent radiologic studies, and pertinent records including other doctor notes if needed.     I independently reviewed radiology images for studies I described above or studies I have ordered. Admission date (for inpatients): 11/27/2020   * No surgery found *  Procedure(s):  CHOLECYSTECTOMY LAPAROSCOPIC POSSIBLE OPEN    ASSESSMENT/PLAN:  Problem List  Date Reviewed: 12/1/2020          Codes Class Noted    Large hiatal hernia (mixed sliding and paraesophageal dating back to 2016) ICD-10-CM: K44.9  ICD-9-CM: 553.3  11/27/2020        Non-intractable vomiting with nausea ICD-10-CM: R11.2  ICD-9-CM: 787.01  11/27/2020        Epigastric pain ICD-10-CM: R10.13  ICD-9-CM: 789.06  11/27/2020        Elevated troponin ICD-10-CM: R77.8  ICD-9-CM: 790.6  11/27/2020        Uncontrolled hypertension ICD-10-CM: I10  ICD-9-CM: 401.9  11/27/2020        * (Principal) Acute calculous cholecystitis ICD-10-CM: K80.00  ICD-9-CM: 574.00  11/27/2020    Overview Signed 12/1/2020  2:45 PM by Silvio Agee MD     12/1/20 s/p lap cholecystectomy; Dr Felicity Durant             Abnormal EKG ICD-10-CM: R94.31  ICD-9-CM: 794.31  11/27/2020        Chest pain ICD-10-CM: R07.9  ICD-9-CM: 786.50  11/27/2020        Hypokalemia ICD-10-CM: E87.6  ICD-9-CM: 276.8  11/27/2020            Principal Problem:    Acute calculous cholecystitis (11/27/2020)      Overview: 12/1/20 s/p lap cholecystectomy; Dr Felicity Durant    Active Problems:    Large hiatal hernia (mixed sliding and paraesophageal dating back to 2016) (11/27/2020)      Non-intractable vomiting with nausea (11/27/2020)      Epigastric pain (11/27/2020)      Elevated troponin (11/27/2020)      Uncontrolled hypertension (11/27/2020)      Abnormal EKG (11/27/2020)      Chest pain (11/27/2020)      Hypokalemia (11/27/2020)           Acute calculous cholecystitis suggested by signs, symptoms, and HIDA scan results  She is s/p lap cholecystectomy for severe acute calculous cholecystitis on 12/1/20.   IOC not possible as a result of friable ischemic cystic duct  LFTs normal post-op  Cont IV Zosyn    Start clears  Await SNF placement    Suspected urinary retention  Wheeler out tomorrow      Large Mixed Hiatal and Paraesophageal Hernia/age 85  She was not felt to be a surgical candidate for this problem when evaluated at [facility] in approximately 2016 and 2017  Her risks were felt to be prohibitive. This has not changed. No chest pain  Lactic acid has remained normal throughout hospitalization   and chest pain subsided within 8 hrs of arrival in the emergency department with NPO status. UGI shows rapid stomach emptying as long as stomach is not overdistended  She will need to take only small meals.   She reports she has already figured that out on her own over the yrs and has trouble if she overeats        Debility  Will need SNF placement  PPD  PT/OT consults    Uncontrolled HTN, chest pain, abnormal EKG, and elevated troponin   Telemetry  Cardiology following  Echo as above in HPI  Troponin elevated x 2; Cardiology thinks related to demand ischemia and HTN  IV BP control       Bilat flank pain  Resolved soon after admission  Etiology unclear  Possibly musculoskeletal   No source for the flank component of the pain on CT or US  Urinalysis normal  Pain meds prn for symptomatic relief    Renal cysts-->No further follow-up needed for these  US shows only small simple left renal cysts    Hypokalemia-->resolved  Replaced   Maint with 20KCL        Jewels Bahena NP

## 2020-12-05 NOTE — ROUTINE PROCESS
Bedside and Verbal shift change report given to self (oncoming nurse) by Macrina Mac RN (offgoing nurse).  Report included the following information SBAR, Kardex, ED Summary, Procedure Summary, Intake/Output, MAR, Recent Results and Cardiac Rhythm SR.

## 2020-12-05 NOTE — PROGRESS NOTES
Patient resting in bed, alert and oriented, cooperative with care. GABRIEL drain in place. Patient on 2 liters of Oxygen via NC. IV patent and infusing. patient denies pain or distress, safety measures in place, call light within reach.

## 2020-12-06 LAB
ANION GAP SERPL CALC-SCNC: 6 MMOL/L (ref 7–16)
BUN SERPL-MCNC: 6 MG/DL (ref 8–23)
CALCIUM SERPL-MCNC: 8.7 MG/DL (ref 8.3–10.4)
CHLORIDE SERPL-SCNC: 109 MMOL/L (ref 98–107)
CO2 SERPL-SCNC: 26 MMOL/L (ref 21–32)
CREAT SERPL-MCNC: 0.75 MG/DL (ref 0.6–1)
ERYTHROCYTE [DISTWIDTH] IN BLOOD BY AUTOMATED COUNT: 15.5 % (ref 11.9–14.6)
GLUCOSE SERPL-MCNC: 105 MG/DL (ref 65–100)
HCT VFR BLD AUTO: 33.1 % (ref 35.8–46.3)
HGB BLD-MCNC: 10.8 G/DL (ref 11.7–15.4)
MCH RBC QN AUTO: 26.8 PG (ref 26.1–32.9)
MCHC RBC AUTO-ENTMCNC: 32.6 G/DL (ref 31.4–35)
MCV RBC AUTO: 82.1 FL (ref 79.6–97.8)
NRBC # BLD: 0 K/UL (ref 0–0.2)
PLATELET # BLD AUTO: 374 K/UL (ref 150–450)
PMV BLD AUTO: 10.1 FL (ref 9.4–12.3)
POTASSIUM SERPL-SCNC: 3.4 MMOL/L (ref 3.5–5.1)
RBC # BLD AUTO: 4.03 M/UL (ref 4.05–5.2)
SODIUM SERPL-SCNC: 141 MMOL/L (ref 136–145)
WBC # BLD AUTO: 8.9 K/UL (ref 4.3–11.1)

## 2020-12-06 PROCEDURE — 77030038269 HC DRN EXT URIN PURWCK BARD -A

## 2020-12-06 PROCEDURE — 74011250636 HC RX REV CODE- 250/636: Performed by: SURGERY

## 2020-12-06 PROCEDURE — 2709999900 HC NON-CHARGEABLE SUPPLY

## 2020-12-06 PROCEDURE — 80048 BASIC METABOLIC PNL TOTAL CA: CPT

## 2020-12-06 PROCEDURE — 74011250637 HC RX REV CODE- 250/637: Performed by: NURSE PRACTITIONER

## 2020-12-06 PROCEDURE — 74011000258 HC RX REV CODE- 258: Performed by: SURGERY

## 2020-12-06 PROCEDURE — 74011250637 HC RX REV CODE- 250/637: Performed by: SURGERY

## 2020-12-06 PROCEDURE — 36415 COLL VENOUS BLD VENIPUNCTURE: CPT

## 2020-12-06 PROCEDURE — 85027 COMPLETE CBC AUTOMATED: CPT

## 2020-12-06 PROCEDURE — 74011000250 HC RX REV CODE- 250: Performed by: SURGERY

## 2020-12-06 PROCEDURE — 65270000029 HC RM PRIVATE

## 2020-12-06 RX ORDER — MAG HYDROX/ALUMINUM HYD/SIMETH 200-200-20
30 SUSPENSION, ORAL (FINAL DOSE FORM) ORAL
Status: DISCONTINUED | OUTPATIENT
Start: 2020-12-06 | End: 2020-12-07 | Stop reason: HOSPADM

## 2020-12-06 RX ORDER — POTASSIUM CHLORIDE 20 MEQ/1
20 TABLET, EXTENDED RELEASE ORAL
Status: COMPLETED | OUTPATIENT
Start: 2020-12-06 | End: 2020-12-06

## 2020-12-06 RX ADMIN — MORPHINE SULFATE 2 MG: 2 INJECTION, SOLUTION INTRAMUSCULAR; INTRAVENOUS at 01:09

## 2020-12-06 RX ADMIN — PIPERACILLIN SODIUM AND TAZOBACTAM SODIUM 3.38 G: 3; .375 INJECTION, POWDER, LYOPHILIZED, FOR SOLUTION INTRAVENOUS at 16:47

## 2020-12-06 RX ADMIN — DEXTROSE MONOHYDRATE, SODIUM CHLORIDE, AND POTASSIUM CHLORIDE 30 ML/HR: 50; 4.5; 1.49 INJECTION, SOLUTION INTRAVENOUS at 21:10

## 2020-12-06 RX ADMIN — PIPERACILLIN SODIUM AND TAZOBACTAM SODIUM 3.38 G: 3; .375 INJECTION, POWDER, LYOPHILIZED, FOR SOLUTION INTRAVENOUS at 00:51

## 2020-12-06 RX ADMIN — PIPERACILLIN SODIUM AND TAZOBACTAM SODIUM 3.38 G: 3; .375 INJECTION, POWDER, LYOPHILIZED, FOR SOLUTION INTRAVENOUS at 09:03

## 2020-12-06 RX ADMIN — AMLODIPINE BESYLATE 5 MG: 5 TABLET ORAL at 09:03

## 2020-12-06 RX ADMIN — OLMESARTAN MEDOXOMIL 20 MG: 5 TABLET, FILM COATED ORAL at 09:22

## 2020-12-06 RX ADMIN — FAMOTIDINE 20 MG: 10 INJECTION INTRAVENOUS at 09:03

## 2020-12-06 RX ADMIN — PIPERACILLIN SODIUM AND TAZOBACTAM SODIUM 3.38 G: 3; .375 INJECTION, POWDER, LYOPHILIZED, FOR SOLUTION INTRAVENOUS at 23:54

## 2020-12-06 RX ADMIN — ENOXAPARIN SODIUM 40 MG: 40 INJECTION SUBCUTANEOUS at 09:03

## 2020-12-06 RX ADMIN — FAMOTIDINE 20 MG: 10 INJECTION INTRAVENOUS at 21:09

## 2020-12-06 RX ADMIN — POTASSIUM CHLORIDE 20 MEQ: 20 TABLET, EXTENDED RELEASE ORAL at 11:27

## 2020-12-06 RX ADMIN — ACETAMINOPHEN ORAL SOLUTION 1000 MG: 325 SOLUTION ORAL at 21:09

## 2020-12-06 NOTE — PROGRESS NOTES
Report received from [facility]. Care assumed and pt identified by name and . Pt resting in bed, a&ox 3 respirations even and unlabored. No distress observed or voiced at this time and no s/sx of agitation, anxiety, pain, dyspnea, sob/cp/ n/v or seizures. Numeric pain score of 0/10. Bed low and locked, siderails x2, call light and possessions within pt's reach. Non-monitored. Monitoring. 2240:  No acute changes at this time. Pt watching tv and in good spirits with unlabored breathing. Call light remains in reach. Tab alarm remains connected to patient. No s/sx of agitation, anxiety, pain, dyspnea, sob, n/v or seizures. Bed in lowest locked position with siderails x2.    0109  Morphine 2mg IV for abdominal tenderness post coughing (5/10 per numeric pain scle). Sites remain covered with clean dry gauze and tegaderm. IV abx infusing. Pt denies any further needs at this time. Call light in reach. Monitoring. 0335:  Pt resting in bed with eyes closed. No distress visualized. No s/sx of agitation, anxiety, pain, dyspnea, sob, n/v or seizures. Respirations present. Call light in reach. Tab alert on. Bed remains lowered and locked. 0343: Watching tv, smiling and talking with respirations unlabored. Purewick changed. New 22g IV placed in right wrist with brisk blood return. Call light in reach. No s/sx of distress observed or voiced. FLACC score 0/10. Report given to Sandra Falcon RN.

## 2020-12-06 NOTE — ROUTINE PROCESS
Shift change report received from Yonatan Bear, [address] [facility] (off going nurse). Plan of care reviewed with patient at bedside. Opportunity to ask questions provided. Denies needs at this time. Bed low and locked with call light within reach. Patient instructed to call for assistance. Patient verbalized understanding.

## 2020-12-06 NOTE — PROGRESS NOTES
H&P/Consult Note/Progress Note/Office Note:   Annalee Scanlon  MRN: [id number]  :1935  Age:85 y.o.    HPI: Annalee Scanlon is a 81 y.o. female who is s/p lap cholecystectomy for acute calculous cholecystitis on 20. She also has a large mixed hiatal and paraesophageal hernia dating back at least to 2016   from studies at [facility] where she was followed and no surgery recommended. She came to the ER on 20 complaining of a 2-day history of pain that she states was primarily involving both of her flanks radiating to her epigastric region as well as the upper chest.    She specifically says she does not think the pain started in her epigastric region or her chest.    The pain was constant and progressive. Nothing in particular made it better or worse. She had associated nausea or vomiting. She was seen in an urgent treatment center and sent to the ER for further evaluation. While in the ER she is found to be significantly hypertensive with a blood pressure 175/99. Her troponin was markedly elevated at 56.7. WBC 17.3k;  LFTs and lipase were normal.  Lactic acid was also normal at 1.0. Urinalysis  Results for Mica Moncada (MRN [id number]) as of 2020 19:29   Ref. Range 2020 17:33   Epithelial cells Latest Ref Range: 0 /hpf 0   Mucus Latest Ref Range: 0 /lpf 0   WBC Latest Ref Range: 0 /hpf 0-3   RBC Latest Ref Range: 0 /hpf 0   Bacteria Latest Ref Range: 0 /hpf 0   Crystals, urine Latest Ref Range: 0 /LPF 0   Casts Latest Ref Range: 0 /lpf 0   Other observations Latest Units:   RESULTS VERIFIED MANUALLY           She has a h/o GERD and prior hysterectomy.   Her large hiatal hernia has been known since at least  and has been imaged and evaluated multiple times at [address] (now [facility])   She was evaluated at [facility] with esophagogram and CT imaging and she reports that the surgeon told her that she was not a surgical candidate for the hiatal hernia  and that her operative risks related to hiatal hernia repair were too high to proceed. She is unsure of her remote history but reports she thinks she had ovarian cancer in the 1950s   and had a hysterectomy followed by chemotherapy and radiation treatment. 10/14/16 CT chest (at [facility])  Impression:  Stable small pulmonary nodule in the right middle lobe.  Indeterminate.  Consider CT chest surveillance 6 months. No thoracic adenopathy. Moderate hiatal hernia. Gallstones. 4/23/18 CT chest (at [facility])  Impression:  Stable 5 mm nodule in the right middle lobe for 2 years. Benign in nature. No thoracic adenopathy. Large hiatal hernia. Multiple gallstones        11/27/18 esophagram (at [facility])  Large hiatal hernia. This is a mixed type hiatal hernia with the GE junction above the diaphragm, however, there is also a paraesophageal component involving the gastric fundus and body. Free GE reflux is demonstrated into the distal  2/3rds of the esophagus, however, there is no associated esophageal ulceration, erosions or mass lesion. There is no esophageal stenosis. A 13 mm diameter barium tablet passed rapidly to the esophagus to the stomach without obstruction. .    IMPRESSION:  1. Large mixed sliding and paraesophageal type hiatal hernia with a portion of the gastric fundus and gastric body in the lower chest.  2.  Extensive gastroesophageal reflux without evidence of associated esophageal ulceration or mass. .         11/27/20 portable CXR  IMPRESSION: Large hiatal hernia, grossly stable compared to prior chest x-rays.       11/27/20 EKG  NSR; Prolonged QT   Abnormal ECG   when compared with ECG of 08-DEC-2017 09:14, QT has lengthened       11/27/20 CT abd/pelvis with IV contrast  Abdomen: The lung bases show a left-sided hiatal hernia with some adjacent  compressive atelectasis.  Air-filled loop of bowel seen between the liver and the  right diaphragm, with diaphragm slips seen along the posterior lateral right  liver, the gallbladder shows multiple gallstones and is somewhat distended with  some adjacent pericholecystic fluid, the pancreas, spleen, adrenals appear  normal. Right kidney appears normal, left kidney midpole shows a 8mm 43  Hounsfield unit minimally complex cystic lesion with the midpole laterally  showing an exophytic 7 mm 15 Hounsfield unit probable simple cyst. There is no  intraperitoneal free air, nor abnormal adenopathy seen. Patient has a high  riding cecum, the appendix is not seen.     Pelvis: There are multiple diverticula within an elongated sigmoid colon, the  bladder and pelvic sidewalls appear normal, the uterus is absent. There is no  intraperitoneal free air, ascites, nor abnormal adenopathy seen.     IMPRESSION:  1. 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 2. Gallstones and some pericholecystic fluid, if cholecystitis is suspected dedicated ultrasound should be considered. 3. There does appear to be a tiny common duct stone distally all below the duct does not appear dilated with a stone measuring 3 mm. 4. Simple cyst left kidney with probable complex cyst left kidney if imaging confirmation is desired nonemergent US should be considered. 5. Multiple diverticula without evidence of active diverticulitis.       11/28/20 echo  EF estimated 60-65%. No regional wall motion abnormalities. Wall thickness was normal. Left ventricular diastolic function parameters were normal. Avg E/e': 9.28.     RIGHT VENTRICLE: The size was normal. Systolic function was normal.  Estimated peak pressure was in the range of 25-30 mmHg. LEFT ATRIUM: Size was normal.  RIGHT ATRIUM: Size was normal.    SUMMARY:  -  Left ventricle: Systolic function was normal. EF 60-65%. No regional wall motion abnormalities. -  Inferior vena cava, hepatic veins: The respirophasic change in diameter was more than 50%. -  Tricuspid valve: There was mild regurgitation.           11/28/20 US abd  FINDINGS:   LIVER: 13.7 cm. Normal echogenicity. No masses. BILE DUCTS: No intrahepatic bile duct dilatation. CBD diameter = 6 mm. GALLBLADDER: Mild gallbladder distention and multiple gallstones. No significant wall thickening. PANCREAS: Normal.  SPLEEN: Normal.     RIGHT KIDNEY: 10.1 cm. No mass or hydronephrosis. LEFT KIDNEY: 10.8 cm. 2 small renal cysts. No hydronephrosis. ABDOMINAL AORTA AND IVC: Normal in size. ASCITES: No free fluid.     IMPRESSION:   1. Distended gallbladder with multiple gallstones. ,  Concerning for early acute cholecystitis based on CT appearance. 2.  Small simple cysts in the left kidney. 11/30/20 Gastrograffin UGI  Patient swallowed barium without difficulty. Esophagus appeared normal.  Redemonstrated is a hiatal hernia. The GE junction is in normal anatomic  location with the fundus, body, antrum in a thoracic location. Enteric contrast  quickly fills the entire nondistended stomach and empties into the duodenum. After the stomach becomes progressively distended with contrast, there is  limited emptying into the duodenum. This results in moderate esophageal reflux.       Impression:   Type IV paraesophageal hernia. There is rapid transit of contrast through the intrathoracic stomach into the intra-abdominal duodenum and small bowel on initial imaging. When the stomach becomes progressively more distended with contrast, limited flow of contrast into the duodenum and small bowel is seen.          11/30/20 HIDA  Prompt and homogeneous uptake of tracer by the liver. Activity is rapidly excreted into the biliary system with no activity seen in the gallbladder by one hour. Patient was injected with 2 mg of morphine and imaged for an additional half hour without activity seen in the gallbladder.     By the end of imaging the CBD and small bowel activity are seen.       IMPRESSION:    Obstructed cystic duct which can be seen with cholecystitis. Additonal hx:  11/28/20 echo pending; IV Abx for possible cholecystitis;  WBC improving; lactic acid normal; LFTs normal; HIDA Monday to look for GB filling  11/29/20 abd pain persists; comes and goes;no SOB; no CP;  lactic acid normal; wbc coming down; UGI today; HIDA in am; cardiology thinks troponin elevation related to HTN/demand ischemia  11/30/20 Intermittent epigastric pain; WBC 9.3k; lactic acid normal   12/1/20  RUQ pain; HIDA show non-vis of GB suggestive of cholecystitis; UGI as above with no obstruction but slow emptying; OR for lap cholecystectomy today  12/2/20 POD1 LC;  complaining of inability to void and string sensation she needs to go; LFTs normal; Foster drain serosang; Wheeler today  12/3/20 POD2 LC; feeling better; AF WBC/LFTs normal start sips of clears  12/4/20 POD3 LC 1/10 incisional pain; LFTs normal clear liquid diet; await SNF placement  12/5/20 POD4 LC 1/10 incisional pain; LFTs normal clear liquid diet; await SNF placement  12/6/20 POD5 LC 1/10 incisional pain; LFTs normal Full liquid diet; await SNF placement.  K+3.4      Past Medical History:   Diagnosis Date    Gastrointestinal disorder     acid reflux    Hypertension      Past Surgical History:   Procedure Laterality Date    HX GYN      cervical CA, hysterectomy    HX ORTHOPAEDIC      knee     Current Facility-Administered Medications   Medication Dose Route Frequency    amLODIPine (NORVASC) tablet 5 mg  5 mg Oral DAILY    olmesartan (BENICAR) tablet 20 mg  20 mg Oral DAILY    hydrALAZINE (APRESOLINE) 20 mg/mL injection 10 mg  10 mg IntraVENous Q6H PRN    dextrose 5% - 0.45% NaCl with KCl 20 mEq/L infusion  30 mL/hr IntraVENous CONTINUOUS    enoxaparin (LOVENOX) injection 40 mg  40 mg SubCUTAneous Q24H    morphine injection 2-4 mg  2-4 mg IntraVENous Q2H PRN    enalaprilat (VASOTEC) injection 1.25 mg  1.25 mg IntraVENous Q6H PRN    piperacillin-tazobactam (ZOSYN) 3.375 g in 0.9% sodium chloride (MBP/ADV) 100 mL MBP  3.375 g IntraVENous Q8H    ondansetron (ZOFRAN) injection 4 mg  4 mg IntraVENous Q4H PRN    famotidine (PF) (PEPCID) 20 mg in 0.9% sodium chloride 10 mL injection  20 mg IntraVENous Q12H    acetaminophen (TYLENOL) solution 1,000 mg  1,000 mg Oral Q6H PRN     Patient has no known allergies. Social History     Socioeconomic History    Marital status:      Spouse name: Not on file    Number of children: Not on file    Years of education: Not on file    Highest education level: Not on file   Tobacco Use    Smoking status: Never Smoker    Smokeless tobacco: Never Used   Substance and Sexual Activity    Alcohol use: No    Drug use: No    Sexual activity: Never     Social History     Tobacco Use   Smoking Status Never Smoker   Smokeless Tobacco Never Used     History reviewed. No pertinent family history. ROS: The patient has no difficulty with chest pain or shortness of breath. No fever or chills. Comprehensive review of systems was otherwise unremarkable except as noted above. Physical Exam:   Visit Vitals  BP (!) 165/75 (BP 1 Location: Right arm, BP Patient Position: At rest)   Pulse 62   Temp 99.3 °F (37.4 °C)   Resp 16   Ht 5' 3\" (1.6 m)   Wt 189 lb 8 oz (86 kg)   SpO2 94%   BMI 33.57 kg/m²     Vitals:    12/05/20 2018 12/06/20 0055 12/06/20 0357 12/06/20 0750   BP: 136/70 (!) 163/80 (!) 150/75 (!) 165/75   Pulse: 84 65 72 62   Resp: 16 20 14 16   Temp: 98.9 °F (37.2 °C) 98.9 °F (37.2 °C) 99 °F (37.2 °C) 99.3 °F (37.4 °C)   SpO2: 94% 95% 95% 94%   Weight:   189 lb 8 oz (86 kg)    Height:         12/06 0701 - 12/06 1900  In: -   Out: 300 [Urine:300]  12/04 1901 - 12/06 0700  In: 1 [P.O.:50; I.V.:920]  Out: 765 [Urine:675; Drains:90]    Constitutional: Alert, oriented, cooperative patient in no acute distress; appears stated age    Eyes: Sclera are clear.  EOMs intact  ENMT: no external lesions gross hearing normal; no obvious neck masses, no ear or lip lesions, nares normal  CV: RRR. Normal perfusion  Resp: No JVD. Breathing is  non-labored; no audible wheezing. GI: dressings dry and intact over trochar sites;  abd soft and non-distended; no epigastric pain;   now with pain around drain exit site;  [place] drain serosang. Musculoskeletal: unremarkable with normal function. No embolic signs or cyanosis. Neuro:  Oriented; moves all 4; no focal deficits  Psychiatric: normal affect and mood, no memory impairment    Recent vitals (if inpt):  Patient Vitals for the past 24 hrs:   BP Temp Pulse Resp SpO2 Weight   12/06/20 0750 (!) 165/75 99.3 °F (37.4 °C) 62 16 94 %    12/06/20 0357 (!) 150/75 99 °F (37.2 °C) 72 14 95 % 189 lb 8 oz (86 kg)   12/06/20 0055 (!) 163/80 98.9 °F (37.2 °C) 65 20 95 %    12/05/20 2018 136/70 98.9 °F (37.2 °C) 84 16 94 %    12/05/20 1703 (!) 147/88 98.7 °F (37.1 °C) 76 18 92 %    12/05/20 1146 (!) 179/77 98.9 °F (37.2 °C) 81 18 97 %        Labs:  Recent Labs     12/06/20  0435  12/04/20  0411   WBC 8.9   < > 7.1   HGB 10.8*   < > 10.2*      < > 293      < > 140   K 3.4*   < > 3.8   *   < > 107   CO2 26   < > 29   BUN 6*   < > 7*   CREA 0.75   < > 0.73   *   < > 101*   TBILI  --   --  0.4   ALT  --   --  57   AP  --   --  92    < > = values in this interval not displayed. Lab Results   Component Value Date/Time    WBC 8.9 12/06/2020 04:35 AM    HGB 10.8 (L) 12/06/2020 04:35 AM    PLATELET 115 00/58/8130 04:35 AM    Sodium 141 12/06/2020 04:35 AM    Potassium 3.4 (L) 12/06/2020 04:35 AM    Chloride 109 (H) 12/06/2020 04:35 AM    CO2 26 12/06/2020 04:35 AM    BUN 6 (L) 12/06/2020 04:35 AM    Creatinine 0.75 12/06/2020 04:35 AM    Glucose 105 (H) 12/06/2020 04:35 AM    Bilirubin, total 0.4 12/04/2020 04:11 AM    ALT (SGPT) 57 12/04/2020 04:11 AM    Alk.  phosphatase 92 12/04/2020 04:11 AM    Lipase 72 (L) 11/27/2020 02:01 PM       CT Results  (Last 48 hours)    None        chest X-ray      I reviewed recent labs, recent radiologic studies, and pertinent records including other doctor notes if needed. I independently reviewed radiology images for studies I described above or studies I have ordered. Admission date (for inpatients): 11/27/2020   * No surgery found *  Procedure(s):  CHOLECYSTECTOMY LAPAROSCOPIC POSSIBLE OPEN    ASSESSMENT/PLAN:  Problem List  Date Reviewed: 12/1/2020          Codes Class Noted    Large hiatal hernia (mixed sliding and paraesophageal dating back to 2016) ICD-10-CM: K44.9  ICD-9-CM: 553.3  11/27/2020        Non-intractable vomiting with nausea ICD-10-CM: R11.2  ICD-9-CM: 787.01  11/27/2020        Epigastric pain ICD-10-CM: R10.13  ICD-9-CM: 789.06  11/27/2020        Elevated troponin ICD-10-CM: R77.8  ICD-9-CM: 790.6  11/27/2020        Uncontrolled hypertension ICD-10-CM: I10  ICD-9-CM: 401.9  11/27/2020        * (Principal) Acute calculous cholecystitis ICD-10-CM: K80.00  ICD-9-CM: 574.00  11/27/2020    Overview Signed 12/1/2020  2:45 PM by Costa Bentley MD     12/1/20 s/p lap cholecystectomy; Dr Harlan Mckeon             Abnormal EKG ICD-10-CM: R94.31  ICD-9-CM: 794.31  11/27/2020        Chest pain ICD-10-CM: R07.9  ICD-9-CM: 786.50  11/27/2020        Hypokalemia ICD-10-CM: E87.6  ICD-9-CM: 276.8  11/27/2020            Principal Problem:    Acute calculous cholecystitis (11/27/2020)      Overview: 12/1/20 s/p lap cholecystectomy; Dr Harlan Mckeon    Active Problems:    Large hiatal hernia (mixed sliding and paraesophageal dating back to 2016) (11/27/2020)      Non-intractable vomiting with nausea (11/27/2020)      Epigastric pain (11/27/2020)      Elevated troponin (11/27/2020)      Uncontrolled hypertension (11/27/2020)      Abnormal EKG (11/27/2020)      Chest pain (11/27/2020)      Hypokalemia (11/27/2020)           Acute calculous cholecystitis suggested by signs, symptoms, and HIDA scan results  She is s/p lap cholecystectomy for severe acute calculous cholecystitis on 12/1/20.   IOC not possible as a result of friable ischemic cystic duct  LFTs normal post-op  Cont IV Zosyn    Start clears  Await SNF placement    Suspected urinary retention  Wheeler out tomorrow      Large Mixed Hiatal and Paraesophageal Hernia/age 85  She was not felt to be a surgical candidate for this problem when evaluated at [facility] in approximately 2016 and 2017  Her risks were felt to be prohibitive. This has not changed. No chest pain  Lactic acid has remained normal throughout hospitalization   and chest pain subsided within 8 hrs of arrival in the emergency department with NPO status. UGI shows rapid stomach emptying as long as stomach is not overdistended  She will need to take only small meals.   She reports she has already figured that out on her own over the yrs and has trouble if she overeats        Debility  Will need SNF placement  PPD  PT/OT consults    Uncontrolled HTN, chest pain, abnormal EKG, and elevated troponin   Telemetry  Cardiology following  Echo as above in HPI  Troponin elevated x 2; Cardiology thinks related to demand ischemia and HTN  IV BP control       Bilat flank pain  Resolved soon after admission  Etiology unclear  Possibly musculoskeletal   No source for the flank component of the pain on CT or US  Urinalysis normal  Pain meds prn for symptomatic relief    Renal cysts-->No further follow-up needed for these  US shows only small simple left renal cysts    Hypokalemia-->resolved  Replaced   Maint with 20KCL        Sumaya Fuentes NP

## 2020-12-07 VITALS
BODY MASS INDEX: 32.9 KG/M2 | DIASTOLIC BLOOD PRESSURE: 95 MMHG | OXYGEN SATURATION: 94 % | SYSTOLIC BLOOD PRESSURE: 138 MMHG | TEMPERATURE: 99.3 F | HEIGHT: 63 IN | WEIGHT: 185.7 LBS | HEART RATE: 84 BPM | RESPIRATION RATE: 20 BRPM

## 2020-12-07 LAB
ANION GAP SERPL CALC-SCNC: 5 MMOL/L (ref 7–16)
BUN SERPL-MCNC: 4 MG/DL (ref 8–23)
CALCIUM SERPL-MCNC: 8.5 MG/DL (ref 8.3–10.4)
CHLORIDE SERPL-SCNC: 112 MMOL/L (ref 98–107)
CO2 SERPL-SCNC: 26 MMOL/L (ref 21–32)
CREAT SERPL-MCNC: 0.75 MG/DL (ref 0.6–1)
ERYTHROCYTE [DISTWIDTH] IN BLOOD BY AUTOMATED COUNT: 15.5 % (ref 11.9–14.6)
GLUCOSE SERPL-MCNC: 108 MG/DL (ref 65–100)
HCT VFR BLD AUTO: 32.8 % (ref 35.8–46.3)
HGB BLD-MCNC: 10.7 G/DL (ref 11.7–15.4)
MCH RBC QN AUTO: 26.8 PG (ref 26.1–32.9)
MCHC RBC AUTO-ENTMCNC: 32.6 G/DL (ref 31.4–35)
MCV RBC AUTO: 82.2 FL (ref 79.6–97.8)
MM INDURATION POC: 0 MM (ref 0–5)
NRBC # BLD: 0 K/UL (ref 0–0.2)
PLATELET # BLD AUTO: 381 K/UL (ref 150–450)
PMV BLD AUTO: 10.3 FL (ref 9.4–12.3)
POTASSIUM SERPL-SCNC: 3.4 MMOL/L (ref 3.5–5.1)
PPD POC: NEGATIVE NEGATIVE
RBC # BLD AUTO: 3.99 M/UL (ref 4.05–5.2)
SODIUM SERPL-SCNC: 143 MMOL/L (ref 136–145)
WBC # BLD AUTO: 7.3 K/UL (ref 4.3–11.1)

## 2020-12-07 PROCEDURE — 85027 COMPLETE CBC AUTOMATED: CPT

## 2020-12-07 PROCEDURE — 74011000250 HC RX REV CODE- 250: Performed by: SURGERY

## 2020-12-07 PROCEDURE — 74011000258 HC RX REV CODE- 258: Performed by: SURGERY

## 2020-12-07 PROCEDURE — 74011250636 HC RX REV CODE- 250/636: Performed by: SURGERY

## 2020-12-07 PROCEDURE — 80048 BASIC METABOLIC PNL TOTAL CA: CPT

## 2020-12-07 PROCEDURE — 74011250637 HC RX REV CODE- 250/637: Performed by: NURSE PRACTITIONER

## 2020-12-07 PROCEDURE — 36415 COLL VENOUS BLD VENIPUNCTURE: CPT

## 2020-12-07 PROCEDURE — 97530 THERAPEUTIC ACTIVITIES: CPT

## 2020-12-07 RX ADMIN — ENOXAPARIN SODIUM 40 MG: 40 INJECTION SUBCUTANEOUS at 08:09

## 2020-12-07 RX ADMIN — PIPERACILLIN SODIUM AND TAZOBACTAM SODIUM 3.38 G: 3; .375 INJECTION, POWDER, LYOPHILIZED, FOR SOLUTION INTRAVENOUS at 08:09

## 2020-12-07 RX ADMIN — FAMOTIDINE 20 MG: 10 INJECTION INTRAVENOUS at 08:09

## 2020-12-07 RX ADMIN — OLMESARTAN MEDOXOMIL 20 MG: 5 TABLET, FILM COATED ORAL at 08:09

## 2020-12-07 RX ADMIN — AMLODIPINE BESYLATE 5 MG: 5 TABLET ORAL at 08:09

## 2020-12-07 NOTE — DISCHARGE INSTRUCTIONS
Empty the drain as often as needed to keep it suctioning (compressed) at all times. Change the dry gauze and tape around the drain exit site as needed for drainage  Leave your incisional dressings alone until follow-up visit. Try to keep incisions as dry as possible to lower risk of infection. No heavy lifting (>5lbs) for 6 weeks to reduce risk of developing a hernia in the incisions. No driving. Pain prescription (Norco) on chart for patient to use as needed for pain  Follow-up with Dr Colton Clemons nurse practitioner Kacie Salomon, NP or Reza Loza NP) in 2 weeks on a Monday. Then see Dr Marissa Guevara as needed after that visit. [address], [address]  (Call for an appt time unless one is already made for you by discharge nurse which is preferred -->[phone]-->option 1)    Diet:  Large hiatal hernia precautions. Soups, Juices, ad Liquids for 2 days. Then soft foods only until follow-up. Avoid large meals      Patient Education        Learning About Gallbladder Removal Surgery  What is it? This surgery removes the gallbladder and gallstones. The gallbladder stores bile made by your liver. The bile helps you digest fats. Gallstones are made of cholesterol and other things found in bile. The surgery is also known as cholecystectomy (xe-utu-amu-CLAUDETTE-tuh-mary). Your body will work fine without a gallbladder. Bile will go straight from the liver to the intestine. There may be small changes in how you digest food. But you probably won't notice them. How is the surgery done? This is usually a laparoscopic surgery. To do this type of surgery, a doctor puts a lighted tube, or scope, and other surgical tools through small cuts (incisions) in your belly. The doctor is able to see your organs with the scope. After your gallbladder is removed, you will no longer have gallstones. The cuts leave scars that usually fade with time.   Open surgery may be done if problems are found during laparoscopic surgery. With open surgery, the gallbladder is removed through one larger cut in your belly. And the hospital stay is longer. What can you expect after surgery? You will probably feel weak and tired for several days after you return home. Your belly may be swollen. If you had laparoscopic surgery, you may also have pain in your shoulder for about 24 hours. You may have gas or need to burp a lot at first.  A few people get diarrhea. It usually goes away in 2 to 4 weeks. But it may last longer. How quickly you get better depends on which kind of surgery you had. For laparoscopic surgery, most people can go back to work or their normal routine in 1 to 2 weeks. It depends on the type of work you do and how you feel. If you have open surgery, it will probably take 4 to 6 weeks before you get back to your normal routine. Follow-up care is a key part of your treatment and safety. Be sure to make and go to all appointments, and call your doctor if you are having problems. It's also a good idea to know your test results and keep a list of the medicines you take. Where can you learn more? Go to http://www.gray.com/  Enter R572 in the search box to learn more about \"Learning About Gallbladder Removal Surgery. \"  Current as of: April 15, 2020               Content Version: 12.6  © 2006-2020 [employer]. Care instructions adapted under license by [employer] (which disclaims liability or warranty for this information). If you have questions about a medical condition or this instruction, always ask your healthcare professional. [place] [address] any warranty or liability for your use of this information. Patient Education        Gallbladder Removal Surgery: What to Expect at Home  Your Recovery  After your surgery, you will likely feel weak and tired for several days after you return home. Your belly may be swollen.  If you had laparoscopic surgery, you may also have pain in your shoulder for about 24 hours. You may have gas or need to burp a lot at first. A few people get diarrhea. The diarrhea usually goes away in 2 to 4 weeks, but it may last longer. How quickly you recover depends on whether you had a laparoscopic or open surgery. · For a laparoscopic surgery, most people can go back to work or their normal routine in 1 to 2 weeks. But it may take longer, depending on the type of work you do. · For an open surgery, it will probably take 4 to 6 weeks before you get back to your normal routine. This care sheet gives you a general idea about how long it will take for you to recover. However, each person recovers at a different pace. Follow the steps below to get better as quickly as possible. How can you care for yourself at home? Activity    · Rest when you feel tired. Getting enough sleep will help you recover.     · Try to walk each day. Start out by walking a little more than you did the day before. Gradually increase the amount you walk. Walking boosts blood flow and helps prevent pneumonia and constipation.     · For about 2 to 4 weeks, avoid lifting anything that would make you strain. This may include a child, heavy grocery bags and milk containers, a heavy briefcase or backpack, cat litter or dog food bags, or a vacuum .     · Avoid strenuous activities, such as biking, jogging, weightlifting, and aerobic exercise, until your doctor says it is okay.     · You may shower 24 to 48 hours after surgery, if your doctor okays it. Pat the cut (incision) dry. Do not take a bath for the first 2 weeks, or until your doctor tells you it is okay.     · You may drive when you are no longer taking pain medicine and can quickly move your foot from the gas pedal to the brake. You must also be able to sit comfortably for a long period of time, even if you do not plan to go far.  You might get caught in traffic.     · For a laparoscopic surgery, most people can go back to work or their normal routine in 1 to 2 weeks, but it may take longer. For an open surgery, it will probably take 4 to 6 weeks before you get back to your normal routine.     · Your doctor will tell you when you can have sex again. Diet    · Eat smaller meals more often instead of fewer larger meals. You can eat a normal diet, but avoid eating fatty foods for about 1 month. Fatty foods include hamburger, whole milk, cheese, and many snack foods. If your stomach is upset, try bland, low-fat foods like plain rice, broiled chicken, toast, and yogurt.     · Drink plenty of fluids (unless your doctor tells you not to).   · If you have diarrhea, try avoiding spicy foods, dairy products, fatty foods, and alcohol. You can also watch to see if specific foods cause it, and stop eating them. If the diarrhea continues for more than 2 weeks, talk to your doctor.     · You may notice that your bowel movements are not regular right after your surgery. This is common. Try to avoid constipation and straining with bowel movements. You may want to take a fiber supplement every day. If you have not had a bowel movement after a couple of days, ask your doctor about taking a mild laxative. Medicines    · Your doctor will tell you if and when you can restart your medicines. He or she will also give you instructions about taking any new medicines.     · If you take aspirin or some other blood thinner, ask your doctor if and when to start taking it again. Make sure that you understand exactly what your doctor wants you to do.     · Take pain medicines exactly as directed. ? If the doctor gave you a prescription medicine for pain, take it as prescribed. ? If you are not taking a prescription pain medicine, take an over-the-counter medicine such as acetaminophen (Tylenol), ibuprofen (Advil, Motrin), or naproxen (Aleve). Read and follow all instructions on the label.   ? Do not take two or more pain medicines at the same time unless the doctor told you to. Many pain medicines contain acetaminophen, which is Tylenol. Too much Tylenol can be harmful.     · If you think your pain medicine is making you sick to your stomach:  ? Take your medicine after meals (unless your doctor tells you not to). ? Ask your doctor for a different pain medicine.     · If your doctor prescribed antibiotics, take them as directed. Do not stop taking them just because you feel better. You need to take the full course of antibiotics. Incision care    · If you have strips of tape on the incision, or cut, leave the tape on for a week or until it falls off.     · After 24 to 48 hours, wash the area daily with warm, soapy water, and pat it dry.     · You may have staples to hold the cut together. Keep them dry until your doctor takes them out. This is usually in 7 to 10 days.     · Keep the area clean and dry. You may cover it with a gauze bandage if it weeps or rubs against clothing. Change the bandage every day. Ice    · To reduce swelling and pain, put ice or a cold pack on your belly for 10 to 20 minutes at a time. Do this every 1 to 2 hours. Put a thin cloth between the ice and your skin. Follow-up care is a key part of your treatment and safety. Be sure to make and go to all appointments, and call your doctor if you are having problems. It's also a good idea to know your test results and keep a list of the medicines you take. When should you call for help? Call 911 anytime you think you may need emergency care. For example, call if:    · You passed out (lost consciousness).     · You are short of breath. .   Call your doctor now or seek immediate medical care if:    · You are sick to your stomach and cannot drink fluids.     · You have pain that does not get better when you take your pain medicine.     · You cannot pass stools or gas.     · You have signs of infection, such as:  ? Increased pain, swelling, warmth, or redness. ? Red streaks leading from the incision. ? Pus draining from the incision. ? A fever.     · Bright red blood has soaked through the bandage over your incision.     · You have loose stitches, or your incision comes open.     · You have signs of a blood clot in your leg (called a deep vein thrombosis), such as:  ? Pain in your calf, back of knee, thigh, or groin. ? Redness and swelling in your leg or groin. Watch closely for any changes in your health, and be sure to contact your doctor if you have any problems. Where can you learn more? Go to http://www.gray.com/  Enter F357 in the search box to learn more about \"Gallbladder Removal Surgery: What to Expect at Home. \"  Current as of: April 15, 2020               Content Version: 12.6  © 5319-4838 [employer]. Care instructions adapted under license by [employer] (which disclaims liability or warranty for this information). If you have questions about a medical condition or this instruction, always ask your healthcare professional. [place] [address] any warranty or liability for your use of this information.

## 2020-12-07 NOTE — ROUTINE PROCESS
Verbal bedside report given to Roseline Cavanaugh oncoming RN. Patient's situation, background, assessment and recommendations provided. Opportunity for questions provided. Oncoming RN assumed care of patient.

## 2020-12-07 NOTE — PROGRESS NOTES
Pt was accepted to [facility]. Transport arranged for 1600. CM attempted to contact the pts dtr, Sabrina Getachew to inform her that [facility] has paperwork for her to sign; left  for a return call. CM informed the pt of her transfer and let her know of contacting her dtr, she stated that she would contact her and let her know. CM contacted Dr. Sujata Lr office ([phone]) about signing the transfer form. Rep stated that he was headed over to [facility] and would have him contact this writer. Will continue to follow. [phone] asked to writer to message Juaquin HUGHES to sign. Message sent. Care Management Interventions  PCP Verified by CM:  Yes  Mode of Transport at Discharge: [address]  [address] Time of Discharge: 1600  Transition of Care Consult (CM Consult): Discharge Planning, SNF  Discharge Durable Medical Equipment: No  Physical Therapy Consult: Yes  Occupational Therapy Consult: Yes  Speech Therapy Consult: No  Current Support Network: Family Lives [place], Own Home  Confirm Follow Up Transport: Family  The Plan for Transition of Care is Related to the Following Treatment Goals : Retun to baseline  The Patient and/or Patient Representative was Provided with a Choice of Provider and Agrees with the Discharge Plan?: Yes  Name of the Patient Representative Who was Provided with a Choice of Provider and Agrees with the Discharge Plan: Patient  Freedom of Choice List was Provided with Basic Dialogue that Supports the Patient's Individualized Plan of Care/Goals, Treatment Preferences and Shares the Quality Data Associated with the Providers?: Yes  The [employer] Information Provided?: No  Discharge Location  Discharge Placement: Skilled nursing facility(Accepted at [facility])

## 2020-12-07 NOTE — PROGRESS NOTES
Verbal report given to Jennifer Womack at [facility]. Receiving nurse aware patient is being transported with her home narcotic kept at the nurses station. All belongings packed. IVs removed. Tramadol counted in front of patient with second RN, Sunni Dove. Narc sheet signed and returned to chart. Prescription bottle with 20 pills placed in shoe box per request of patient.

## 2020-12-07 NOTE — PROGRESS NOTES
Problem: Mobility Impaired (Adult and Pediatric)  Goal: *Acute Goals and Plan of Care (Insert Text)  Description: LTG:  (1.)Ms. Juliane Zamora will move from supine to sit and sit to supine, scoot up and down, and roll side to side INDEPENDENTLY with bed flat within 7 treatment day(s). (2.)Ms. Juliane Zamora will transfer from bed to chair and chair to bed with MODIFIED INDEPENDENCE using the least restrictive device within 7 treatment day(s). (3.)Ms. Juliane Zamora will ambulate with SUPERVISION for 100+ feet with the least restrictive device within 7 treatment day(s). ________________________________________________________________________________________________     Outcome: Progressing Towards Goal     PHYSICAL THERAPY: Daily Note and AM 12/7/2020  INPATIENT: PT Visit Days : 2  Payor: Dimitris Saliva / Plan: [address] / Product Type: [email or web address] Care Medicare /       NAME/AGE/GENDER: Alba Lo is a 81 y.o. female   PRIMARY DIAGNOSIS: Chest pain [R07.9] Acute calculous cholecystitis Acute calculous cholecystitis  Procedure(s) (LRB):  CHOLECYSTECTOMY LAPAROSCOPIC POSSIBLE OPEN (N/A)  6 Days Post-Op  ICD-10: Treatment Diagnosis:    · Generalized Muscle Weakness (M62.81)  · Difficulty in walking, Not elsewhere classified (R26.2)  · Other abnormalities of gait and mobility (R26.89)   Precaution/Allergies:  Patient has no known allergies. ASSESSMENT:     Patient presents sitting up in he bed agreeable to have therapy. She reports she is being discharged to a rehab facility today. She states she would like to get up and have therapy before she goes. She likes to direct her care and she needs additional time to allow her to move herself as she wants. She was SBA for supine to sit with use of the bed controls. Sitting balance was good. Sit to stand was CGA with use of slight bed elevation.   Static standing balance with the RW is fair with the need for manual/verbal cues for upright standing posture. She managed 12 steps with the RW with CGA from the bed to the bedside chair. Her steps were short and she stayed flexed at the trunk, but she was steady with her transition. She elected to sit up in the bedside chair and was positioned to allow for AROM exercises with her legs extended. Ms. Ava Wheeler likes to talk and likes to be very independent with her mobility. She was kind and cooperative with therapy today. She is making steady progress towards her goals. This section established at most recent assessment   PROBLEM LIST (Impairments causing functional limitations):  1. Decreased Strength  2. Decreased Transfer Abilities  3. Decreased Ambulation Ability/Technique  4. Decreased Balance  5. Decreased Activity Tolerance   INTERVENTIONS PLANNED: (Benefits and precautions of physical therapy have been discussed with the patient.)  1. Balance Exercise  2. Bed Mobility  3. Gait Training  4. Home Exercise Program (HEP)  5. Therapeutic Activites  6. Therapeutic Exercise/Strengthening  7. Transfer Training     TREATMENT PLAN: Frequency/Duration: 3 times a week for duration of hospital stay  Rehabilitation Potential For Stated Goals: Good     REHAB RECOMMENDATIONS (at time of discharge pending progress):    Placement: It is my opinion, based on this patient's performance to date, that Ms. Ava Wheeler may benefit from intensive therapy at a [address] [place] [address] after discharge due to the functional deficits listed above that are likely to improve with skilled rehabilitation and concerns that he/she may be unsafe to be unsupervised at home due to weakness, fall risk, need for assistance with mobility.   Equipment:    tbd at rehab              HISTORY:   History of Present Injury/Illness (Reason for Referral):  Per H&P, \"Olu Polo is a 81 y.o. 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 She came to the ER on 11/27/20 complaining of a 2-day history of pain that she states was primarily involving both of her flanks radiating to her epigastric region as well as the upper chest.    She specifically says she does not think the pain started in her epigastric region or her chest.    The pain was constant and progressive. Nothing in particular made it better or worse. She had associated nausea or vomiting. She was seen in an urgent treatment center and sent to the ER for further evaluation. While in the ER she is found to be significantly hypertensive with a blood pressure 175/99. Her troponin was markedly elevated at 56.7. WBC 17.3k;  LFTs and lipase were normal.  Lactic acid was also normal at 1.0.\"  Past Medical History/Comorbidities:   Ms. Ovi Burns  has a past medical history of Gastrointestinal disorder and Hypertension. Ms. Ovi Burns  has a past surgical history that includes hx gyn and hx orthopaedic. Social History/Living Environment:   Home Environment: Private residence  One/Two Story Residence: Two story  # of Interior Steps: 12  Lift Chair Available: No  Living Alone: Yes  Support Systems: Child(ayleen)  Patient Expects to be Discharged to[de-identified] Private residence  Current DME Used/Available at Home: Cane, straight  Prior Level of Function/Work/Activity:  Lives alone. Cane or \"crutch\" at times.       Number of Personal Factors/Comorbidities that affect the Plan of Care: 1-2: MODERATE COMPLEXITY   EXAMINATION:   Most Recent Physical Functioning:   Gross Assessment:                  Posture:     Balance:  Sitting: Intact  Sitting - Static: Good (unsupported)  Sitting - Dynamic: Good (unsupported)  Standing: Impaired  Standing - Static: Fair;Good;Constant support  Standing - Dynamic : Fair;Constant support Bed Mobility:  Rolling: Stand-by assistance  Supine to Sit: Stand-by assistance  Scooting: Contact guard assistance  Wheelchair Mobility:     Transfers:  Sit to Stand: Contact guard assistance  Stand to Sit: Contact guard assistance  Bed to Chair: Contact guard assistance  Gait:     Speed/Orin: Slow;Shuffled  Step Length: Left shortened;Right shortened  Gait Abnormalities: Decreased step clearance  Distance (ft): 12 Feet (ft)  Assistive Device: Walker, rolling  Ambulation - Level of Assistance: Contact guard assistance;Minimal assistance  Interventions: Safety awareness training;Manual cues; Verbal cues(specifically for upright standing posture)      Body Structures Involved:  1. Muscles Body Functions Affected:  1. Sensory/Pain  2. Movement Related  3. Skin Related Activities and Participation Affected:  1. General Tasks and Demands  2. Mobility  3. Domestic Life  4. Community, Social and [place]   Number of elements that affect the Plan of Care: 4+: HIGH COMPLEXITY   CLINICAL PRESENTATION:   Presentation: Evolving clinical presentation with changing clinical characteristics: MODERATE COMPLEXITY   CLINICAL DECISION MAKIN [facility] Mobility Inpatient Short Form  How much difficulty does the patient currently have. .. Unable A Lot A Little None   1. Turning over in bed (including adjusting bedclothes, sheets and blankets)? [] 1   [] 2   [x] 3   [] 4   2. Sitting down on and standing up from a chair with arms ( e.g., wheelchair, bedside commode, etc.)   [] 1   [x] 2   [] 3   [] 4   3. Moving from lying on back to sitting on the side of the bed? [] 1   [x] 2   [] 3   [] 4   How much help from another person does the patient currently need. .. Total A Lot A Little None   4. Moving to and from a bed to a chair (including a wheelchair)? [] 1   [x] 2   [] 3   [] 4   5. Need to walk in hospital room? [] 1   [x] 2   [] 3   [] 4   6. Climbing 3-5 steps with a railing? [x] 1   [] 2   [] 3   [] 4   © , Trustees of [address] [place] [address] [address] [address], under license to [employer].  All rights reserved      Score:  Initial: 12 Most Recent: X (Date: -- )    Interpretation of Tool:  Represents activities that are increasingly more difficult (i.e. Bed mobility, Transfers, Gait). Medical Necessity:     · Patient demonstrates good rehab potential due to higher previous functional level. Reason for Services/Other Comments:  · Patient continues to demonstrate capacity to improve strength, balance, and activity tolerance which will increase independence, decrease amount of assistance required from caregiver and increase safety. Use of outcome tool(s) and clinical judgement create a POC that gives a: Questionable prediction of patient's progress: MODERATE COMPLEXITY            TREATMENT:   (In addition to Assessment/Re-Assessment sessions the following treatments were rendered)   Pre-treatment Symptoms/Complaints:  Patient complains of stomach discomfort with increased trunk flexion in sitting so she guards her movements. Pain: Initial:   Pain Intensity 1: 0  Post Session:  0/10     Therapeutic Activity: (  ):  Therapeutic activities including Bed transfers, Chair transfers, Ambulation on level ground and sit to stand and standing to improve mobility and strength. Required minimal Safety awareness training;Manual cues; Verbal cues(specifically for upright standing posture) to promote static and dynamic balance in standing. Date:  12/7 Date:   Date:     Activity/Exercise Parameters Parameters Parameters   Ankle pumps 10 B     Heel slides 10 B     HIp abd/add in supine with legs extended 10 B     Seated knee extension 10 B                         Braces/Orthotics/Lines/Etc:   · drain elin  Treatment/Session Assessment:    · Response to Treatment: Patient participated and tolerated therapy well today.   She likes to be very independent with her activities and she needs additional time   · Interdisciplinary Collaboration:   o Physical Therapist  o Registered Nurse  · After treatment position/precautions:   o Up in chair  o Bed/Chair-wheels locked  o Call light within reach  o RN notified   · Compliance with Program/Exercises: Will assess as treatment progresses  · Recommendations/Intent for next treatment session: \"Next visit will focus on advancements to more challenging activities and reduction in assistance provided\".   Total Treatment Duration:  PT Patient Time In/Time Out  Time In: 8483  Time Out: Rose Chopra

## 2020-12-07 NOTE — DISCHARGE SUMMARY
GIANNIebonyallyn [address] [facility]  [phone]   Discharge Summary     Annalee Scanlon  MRN: [id number]     : 1935     Age: 81 y. o. Admit date: 2020     Discharge date:  20  Attending Physician: Lucía Kim MD, MD, TAHIR  Primary Discharge Diagnosis:   Principal Problem:    Acute calculous cholecystitis (2020)      Overview: 20 s/p lap cholecystectomy; Dr Sandhya Valle    Active Problems:    Large hiatal hernia (mixed sliding and paraesophageal dating back to ) (2020)      Non-intractable vomiting with nausea (2020)      Epigastric pain (2020)      Elevated troponin (2020)      Uncontrolled hypertension (2020)      Abnormal EKG (2020)      Chest pain (2020)      Hypokalemia (2020)      Primary Operations or Procedures Performed :  Procedure(s):  CHOLECYSTECTOMY LAPAROSCOPIC POSSIBLE OPEN     Brief History and Reason for Admission: Annalee Scanlon was admitted with the following history of present illness. HPI: Annalee Scanlon is a 81 y.o. female who is s/p lap cholecystectomy for acute calculous cholecystitis on 20.        She also has a large mixed hiatal and paraesophageal hernia dating back at least to    from studies at [facility] where she was followed and no surgery recommended.     She came to the ER on 20 complaining of a 2-day history of pain that she states was primarily involving both of her flanks radiating to her epigastric region as well as the upper chest.    She specifically says she does not think the pain started in her epigastric region or her chest.    The pain was constant and progressive. Nothing in particular made it better or worse. She had associated nausea or vomiting. She was seen in an urgent treatment center and sent to the ER for further evaluation.   While in the ER she is found to be significantly hypertensive with a blood pressure 175/99. Her troponin was markedly elevated at 56.7. WBC 17.3k;  LFTs and lipase were normal.  Lactic acid was also normal at 1.0.     Urinalysis  Results for Desirae Dumont (MRN [id number]) as of 11/27/2020 19:29    Ref. Range 11/27/2020 17:33   Epithelial cells Latest Ref Range: 0 /hpf 0   Mucus Latest Ref Range: 0 /lpf 0   WBC Latest Ref Range: 0 /hpf 0-3   RBC Latest Ref Range: 0 /hpf 0   Bacteria Latest Ref Range: 0 /hpf 0   Crystals, urine Latest Ref Range: 0 /LPF 0   Casts Latest Ref Range: 0 /lpf 0   Other observations Latest Units:   RESULTS VERIFIED MANUALLY               She has a h/o GERD and prior hysterectomy. Her large hiatal hernia has been known since at least 2016 and has been imaged and evaluated multiple times at [facility]   She was evaluated at [facility] with esophagogram and CT imaging and she reports that the surgeon told her that she was not a surgical candidate for the hiatal hernia  and that her operative risks related to hiatal hernia repair were too high to proceed.       She is unsure of her remote history but reports she thinks she had ovarian cancer in the 1950s   and had a hysterectomy followed by chemotherapy and radiation treatment.           10/14/16 CT chest (at [facility])  Impression:  Stable small pulmonary nodule in the right middle lobe.  Indeterminate.  Consider CT chest surveillance 6 months. No thoracic adenopathy. Moderate hiatal hernia. Gallstones.        4/23/18 CT chest (at [facility])  Impression:  Stable 5 mm nodule in the right middle lobe for 2 years. Benign in nature. No thoracic adenopathy. Large hiatal hernia. Multiple gallstones           11/27/18 esophagram (at [facility])  Large hiatal hernia. This is a mixed type hiatal hernia with the GE junction above the diaphragm, however, there is also a paraesophageal component involving the gastric fundus and body.   Free GE reflux is demonstrated into the distal  2/3rds of the esophagus, however, there is no associated esophageal ulceration, erosions or mass lesion. There is no esophageal stenosis. A 13 mm diameter barium tablet passed rapidly to the esophagus to the stomach without obstruction. .    IMPRESSION:  1. Large mixed sliding and paraesophageal type hiatal hernia with a portion of the gastric fundus and gastric body in the lower chest.  2.  Extensive gastroesophageal reflux without evidence of associated esophageal ulceration or mass. .            11/27/20 portable CXR  IMPRESSION: Large hiatal hernia, grossly stable compared to prior chest x-rays.        11/27/20 EKG  NSR; Prolonged QT   Abnormal ECG   when compared with ECG of 08-DEC-2017 09:14, QT has lengthened         11/27/20 CT abd/pelvis with IV contrast  Abdomen: The lung bases show a left-sided hiatal hernia with some adjacent  compressive atelectasis. Air-filled loop of bowel seen between the liver and the  right diaphragm, with diaphragm slips seen along the posterior lateral right  liver, the gallbladder shows multiple gallstones and is somewhat distended with  some adjacent pericholecystic fluid, the pancreas, spleen, adrenals appear  normal. Right kidney appears normal, left kidney midpole shows a 8mm 43  Hounsfield unit minimally complex cystic lesion with the midpole laterally  showing an exophytic 7 mm 15 Hounsfield unit probable simple cyst. There is no  intraperitoneal free air, nor abnormal adenopathy seen. Patient has a high  riding cecum, the appendix is not seen.     Pelvis: There are multiple diverticula within an elongated sigmoid colon, the  bladder and pelvic sidewalls appear normal, the uterus is absent. There is no  intraperitoneal free air, ascites, nor abnormal adenopathy seen.     IMPRESSION:  1. 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 2. Gallstones and some pericholecystic fluid, if cholecystitis is suspected dedicated ultrasound should be considered.   3. There does appear to be a tiny common duct stone distally all below the duct does not appear dilated with a stone measuring 3 mm. 4. Simple cyst left kidney with probable complex cyst left kidney if imaging confirmation is desired nonemergent US should be considered. 5. Multiple diverticula without evidence of active diverticulitis.        11/28/20 echo  EF estimated 60-65%. No regional wall motion abnormalities. Wall thickness was normal. Left ventricular diastolic function parameters were normal. Avg E/e': 9.28.     RIGHT VENTRICLE: The size was normal. Systolic function was normal.  Estimated peak pressure was in the range of 25-30 mmHg. LEFT ATRIUM: Size was normal.  RIGHT ATRIUM: Size was normal.     SUMMARY:  -  Left ventricle: Systolic function was normal. EF 60-65%. No regional wall motion abnormalities. -  Inferior vena cava, hepatic veins: The respirophasic change in diameter was more than 50%. -  Tricuspid valve: There was mild regurgitation.              11/28/20 US abd  FINDINGS:   LIVER: 13.7 cm.  Normal echogenicity.  No masses. BILE DUCTS: No intrahepatic bile duct dilatation.  CBD diameter = 6 mm. GALLBLADDER: Mild gallbladder distention and multiple gallstones.    No significant wall thickening. PANCREAS: Normal.  SPLEEN: Normal.     RIGHT KIDNEY: 10.1 cm.  No mass or hydronephrosis. LEFT KIDNEY: 10.8 cm.  2 small renal cysts.  No hydronephrosis. ABDOMINAL AORTA AND IVC: Normal in size. ASCITES: No free fluid.     IMPRESSION:   1.  Distended gallbladder with multiple gallstones. ,  Concerning for early acute cholecystitis based on CT appearance. 2.  Small simple cysts in the left kidney.              11/30/20 Gastrograffin UGI  Patient swallowed barium without difficulty. Esophagus appeared normal.  Redemonstrated is a hiatal hernia. The GE junction is in normal anatomic  location with the fundus, body, antrum in a thoracic location.  Enteric contrast  quickly fills the entire nondistended stomach and empties into the duodenum. After the stomach becomes progressively distended with contrast, there is  limited emptying into the duodenum. This results in moderate esophageal reflux.       Impression:   Type IV paraesophageal hernia. There is rapid transit of contrast through the intrathoracic stomach into the intra-abdominal duodenum and small bowel on initial imaging. When the stomach becomes progressively more distended with contrast, limited flow of contrast into the duodenum and small bowel is seen.           11/30/20 HIDA  Prompt and homogeneous uptake of tracer by the liver.    Activity is rapidly excreted into the biliary system with no activity seen in the gallbladder by one hour. Patient was injected with 2 mg of morphine and imaged for an additional half hour without activity seen in the gallbladder.    By the end of imaging the CBD and small bowel activity are seen.       IMPRESSION:    Obstructed cystic duct which can be seen with cholecystitis.    [facility] Course: Additonal hx:  11/28/20 echo pending; IV Abx for possible cholecystitis;  WBC improving; lactic acid normal; LFTs normal; HIDA Monday to look for GB filling  11/29/20 abd pain persists; comes and goes;no SOB; no CP;  lactic acid normal; wbc coming down; UGI today; HIDA in am; cardiology thinks troponin elevation related to HTN/demand ischemia  11/30/20 Intermittent epigastric pain; WBC 9.3k; lactic acid normal   12/1/20  RUQ pain; HIDA show non-vis of GB suggestive of cholecystitis; UGI as above with no obstruction but slow emptying; OR for lap cholecystectomy today  12/2/20 POD1 LC;  complaining of inability to void and string sensation she needs to go; LFTs normal; Foster drain serosang;  [place] today  12/3/20 POD2 LC; feeling better; AF WBC/LFTs normal start sips of clears  12/4/20 POD3 LC 1/10 incisional pain; LFTs normal clear liquid diet; await SNF placement  12/5/20 POD4 LC 1/10 incisional pain; LFTs normal clear liquid diet; await SNF placement  12/6/20 POD5 LC 1/10 incisional pain; LFTs normal Full liquid diet; await SNF placement. K+3.4  12/7/20 POD6 LC; doing well; Tx to SNF today with abdominal drain to bulb suction        Problem list on day of discharge  Acute calculous cholecystitis suggested by signs, symptoms, and HIDA scan results  She is s/p lap cholecystectomy for severe acute calculous cholecystitis on 12/1/20. IOC not possible as a result of friable ischemic cystic duct  LFTs normal post-op  She was treated with IV Zosyn while inpt  No Abx planned as outpt       Tolerating liquid diet  Short-term SNF placement as she is frail and debilitated and lives alone       Suspected urinary retention-->treated while inpt with IV Zosyn        Large Mixed Hiatal and Paraesophageal Hernia/age 85  She was not felt to be a surgical candidate for this problem when evaluated at [address] [place] [address] in approximately 2016 and 2017  Her risks were felt to be prohibitive. This has not changed.      No chest pain  Lactic acid has remained normal throughout hospitalization   and chest pain subsided within 8 hrs of arrival in the emergency department with NPO status.     UGI shows rapid stomach emptying as long as stomach is not overdistended  She will need to take only small meals.   She reports she has already figured that out on her own over the yrs and has trouble if she overeats           Debility  Will need SNF placement  PPD  PT/OT consulted while inpt     Uncontrolled HTN, chest pain, abnormal EKG, and elevated troponin -->resolved  Telemetry initially  Cardiology followed  Echo as above in HPI  Troponin elevated x 2; Cardiology thinks related to demand ischemia and HTN  BP control         Bilat flank pain  Resolved soon after admission  Etiology unclear  Possibly musculoskeletal   No source for the flank component of the pain on CT or US  Urinalysis normal  Pain meds prn for symptomatic relief     Renal cysts-->No further follow-up needed for these  US shows only small simple left renal cysts     Hypokalemia-->resolved  Replaced   Maint with 20KCL               Condition at Discharge: good    Discharge Medications:   Current Discharge Medication List      CONTINUE these medications which have NOT CHANGED    Details   omeprazole (PRILOSEC) 20 mg capsule Take 20 mg by mouth two (2) times a day. Indications: gastroesophageal reflux disease      traMADoL (ULTRAM) 50 mg tablet Take 50 mg by mouth every eight (8) hours as needed for Pain. Indications: pain      lidocaine (LIDODERM) 5 % Apply patch to the affected area for 12 hours a day and remove for 12 hours a day. Qty: 20 Each, Refills: 0      clidinium-chlordiazepoxide (LIBRAX) 5-2.5 mg per capsule Take 1 Cap by mouth 4 times daily (before meals and nightly). olmesartan (BENICAR) 20 mg tablet Take 20 mg by mouth daily. amLODIPine (NORVASC) 5 mg tablet Take 5 mg by mouth daily.                Disposition/Discharge Instructions/Follow-up Care:          Signed:  Kami Joseph MD, FACS   12/7/2020  7:31 AM

## 2020-12-07 NOTE — ROUTINE PROCESS
Shift change report received from Marck Figueroa, [facility] (off going nurse). Plan of care reviewed with patient at bedside. Opportunity to ask questions provided. Denies needs at this time. Bed low and locked with call light within reach. Patient instructed to call for assistance. Patient verbalized understanding.

## 2020-12-18 ENCOUNTER — HOSPITAL ENCOUNTER (INPATIENT)
Age: 85
LOS: 5 days | Discharge: NURSING, REHAB OR LONG-TERM CARE | DRG: 189 | End: 2020-12-23
Attending: STUDENT IN AN ORGANIZED HEALTH CARE EDUCATION/TRAINING PROGRAM | Admitting: FAMILY MEDICINE
Payer: MEDICARE

## 2020-12-18 ENCOUNTER — APPOINTMENT (OUTPATIENT)
Dept: GENERAL RADIOLOGY | Age: 85
DRG: 189 | End: 2020-12-18
Attending: STUDENT IN AN ORGANIZED HEALTH CARE EDUCATION/TRAINING PROGRAM
Payer: MEDICARE

## 2020-12-18 DIAGNOSIS — R10.13 EPIGASTRIC PAIN: ICD-10-CM

## 2020-12-18 DIAGNOSIS — K80.00 ACUTE CALCULOUS CHOLECYSTITIS: ICD-10-CM

## 2020-12-18 DIAGNOSIS — R09.02 HYPOXIA: Primary | ICD-10-CM

## 2020-12-18 DIAGNOSIS — R06.02 SOB (SHORTNESS OF BREATH): ICD-10-CM

## 2020-12-18 PROBLEM — E11.9 TYPE 2 DIABETES MELLITUS WITHOUT COMPLICATION, WITHOUT LONG-TERM CURRENT USE OF INSULIN (HCC): Status: ACTIVE | Noted: 2020-11-11

## 2020-12-18 PROBLEM — J96.01 ACUTE RESPIRATORY FAILURE WITH HYPOXIA (HCC): Status: ACTIVE | Noted: 2020-12-18

## 2020-12-18 LAB
ALBUMIN SERPL-MCNC: 2.6 G/DL (ref 3.2–4.6)
ALBUMIN/GLOB SERPL: 0.6 {RATIO} (ref 1.2–3.5)
ALP SERPL-CCNC: 94 U/L (ref 50–136)
ALT SERPL-CCNC: 22 U/L (ref 12–65)
ANION GAP SERPL CALC-SCNC: 6 MMOL/L (ref 7–16)
APTT PPP: 35.3 SEC (ref 24.1–35.1)
AST SERPL-CCNC: 16 U/L (ref 15–37)
BASOPHILS # BLD: 0 K/UL (ref 0–0.2)
BASOPHILS NFR BLD: 0 % (ref 0–2)
BILIRUB SERPL-MCNC: 0.5 MG/DL (ref 0.2–1.1)
BNP SERPL-MCNC: 2566 PG/ML
BUN SERPL-MCNC: 6 MG/DL (ref 8–23)
CALCIUM SERPL-MCNC: 8.7 MG/DL (ref 8.3–10.4)
CHLORIDE SERPL-SCNC: 104 MMOL/L (ref 98–107)
CO2 SERPL-SCNC: 31 MMOL/L (ref 21–32)
CREAT SERPL-MCNC: 0.72 MG/DL (ref 0.6–1)
CRP SERPL-MCNC: 17.3 MG/DL (ref 0–0.9)
D DIMER PPP FEU-MCNC: 17.91 UG/ML(FEU)
DIFFERENTIAL METHOD BLD: ABNORMAL
EOSINOPHIL # BLD: 0.2 K/UL (ref 0–0.8)
EOSINOPHIL NFR BLD: 2 % (ref 0.5–7.8)
ERYTHROCYTE [DISTWIDTH] IN BLOOD BY AUTOMATED COUNT: 15.3 % (ref 11.9–14.6)
GLOBULIN SER CALC-MCNC: 4.4 G/DL (ref 2.3–3.5)
GLUCOSE BLD STRIP.AUTO-MCNC: 110 MG/DL (ref 65–100)
GLUCOSE BLD STRIP.AUTO-MCNC: 112 MG/DL (ref 65–100)
GLUCOSE BLD STRIP.AUTO-MCNC: 95 MG/DL (ref 65–100)
GLUCOSE SERPL-MCNC: 114 MG/DL (ref 65–100)
HCT VFR BLD AUTO: 36.6 % (ref 35.8–46.3)
HGB BLD-MCNC: 12.2 G/DL (ref 11.7–15.4)
IMM GRANULOCYTES # BLD AUTO: 0.1 K/UL (ref 0–0.5)
IMM GRANULOCYTES NFR BLD AUTO: 1 % (ref 0–5)
INR PPP: 1.1
LACTATE SERPL-SCNC: 1 MMOL/L (ref 0.4–2)
LYMPHOCYTES # BLD: 1.9 K/UL (ref 0.5–4.6)
LYMPHOCYTES NFR BLD: 18 % (ref 13–44)
MAGNESIUM SERPL-MCNC: 2.8 MG/DL (ref 1.8–2.4)
MCH RBC QN AUTO: 27 PG (ref 26.1–32.9)
MCHC RBC AUTO-ENTMCNC: 33.3 G/DL (ref 31.4–35)
MCV RBC AUTO: 81 FL (ref 79.6–97.8)
MONOCYTES # BLD: 0.7 K/UL (ref 0.1–1.3)
MONOCYTES NFR BLD: 6 % (ref 4–12)
NEUTS SEG # BLD: 7.9 K/UL (ref 1.7–8.2)
NEUTS SEG NFR BLD: 73 % (ref 43–78)
NRBC # BLD: 0 K/UL (ref 0–0.2)
PHOSPHATE SERPL-MCNC: 3.4 MG/DL (ref 2.3–3.7)
PLATELET # BLD AUTO: 222 K/UL (ref 150–450)
PMV BLD AUTO: 10.2 FL (ref 9.4–12.3)
POTASSIUM SERPL-SCNC: 3.3 MMOL/L (ref 3.5–5.1)
PROCALCITONIN SERPL-MCNC: <0.05 NG/ML
PROT SERPL-MCNC: 7 G/DL (ref 6.3–8.2)
PROTHROMBIN TIME: 14.8 SEC (ref 12.5–14.7)
RBC # BLD AUTO: 4.52 M/UL (ref 4.05–5.2)
SARS COV-2, XPGCVT: NEGATIVE
SODIUM SERPL-SCNC: 141 MMOL/L (ref 136–145)
SOURCE, COVRS: NORMAL
WBC # BLD AUTO: 10.7 K/UL (ref 4.3–11.1)

## 2020-12-18 PROCEDURE — 85610 PROTHROMBIN TIME: CPT

## 2020-12-18 PROCEDURE — 36415 COLL VENOUS BLD VENIPUNCTURE: CPT

## 2020-12-18 PROCEDURE — 87635 SARS-COV-2 COVID-19 AMP PRB: CPT

## 2020-12-18 PROCEDURE — 86140 C-REACTIVE PROTEIN: CPT

## 2020-12-18 PROCEDURE — 99285 EMERGENCY DEPT VISIT HI MDM: CPT

## 2020-12-18 PROCEDURE — 84145 PROCALCITONIN (PCT): CPT

## 2020-12-18 PROCEDURE — 85730 THROMBOPLASTIN TIME PARTIAL: CPT

## 2020-12-18 PROCEDURE — 83880 ASSAY OF NATRIURETIC PEPTIDE: CPT

## 2020-12-18 PROCEDURE — 83735 ASSAY OF MAGNESIUM: CPT

## 2020-12-18 PROCEDURE — 85379 FIBRIN DEGRADATION QUANT: CPT

## 2020-12-18 PROCEDURE — 80053 COMPREHEN METABOLIC PANEL: CPT

## 2020-12-18 PROCEDURE — 82962 GLUCOSE BLOOD TEST: CPT

## 2020-12-18 PROCEDURE — 74011000250 HC RX REV CODE- 250: Performed by: INTERNAL MEDICINE

## 2020-12-18 PROCEDURE — 74011250637 HC RX REV CODE- 250/637: Performed by: FAMILY MEDICINE

## 2020-12-18 PROCEDURE — 84100 ASSAY OF PHOSPHORUS: CPT

## 2020-12-18 PROCEDURE — 83605 ASSAY OF LACTIC ACID: CPT

## 2020-12-18 PROCEDURE — 65270000029 HC RM PRIVATE

## 2020-12-18 PROCEDURE — 71045 X-RAY EXAM CHEST 1 VIEW: CPT

## 2020-12-18 PROCEDURE — 85025 COMPLETE CBC W/AUTO DIFF WBC: CPT

## 2020-12-18 RX ORDER — METOPROLOL TARTRATE 50 MG/1
50 TABLET ORAL
Status: DISCONTINUED | OUTPATIENT
Start: 2020-12-18 | End: 2020-12-23 | Stop reason: HOSPADM

## 2020-12-18 RX ORDER — OLMESARTAN MEDOXOMIL 20 MG/1
20 TABLET ORAL DAILY
Status: DISCONTINUED | OUTPATIENT
Start: 2020-12-19 | End: 2020-12-23 | Stop reason: HOSPADM

## 2020-12-18 RX ORDER — HYDROCODONE BITARTRATE AND HOMATROPINE METHYLBROMIDE 1.5; 5 MG/5ML; MG/5ML
5 SYRUP ORAL
Status: DISCONTINUED | OUTPATIENT
Start: 2020-12-18 | End: 2020-12-23 | Stop reason: HOSPADM

## 2020-12-18 RX ORDER — INSULIN LISPRO 100 [IU]/ML
INJECTION, SOLUTION INTRAVENOUS; SUBCUTANEOUS
Status: DISCONTINUED | OUTPATIENT
Start: 2020-12-18 | End: 2020-12-23 | Stop reason: HOSPADM

## 2020-12-18 RX ORDER — BRIMONIDINE TARTRATE, TIMOLOL MALEATE 2; 5 MG/ML; MG/ML
1 SOLUTION/ DROPS OPHTHALMIC 2 TIMES DAILY
COMMUNITY

## 2020-12-18 RX ORDER — SODIUM CHLORIDE 0.9 % (FLUSH) 0.9 %
5-40 SYRINGE (ML) INJECTION EVERY 8 HOURS
Status: DISCONTINUED | OUTPATIENT
Start: 2020-12-18 | End: 2020-12-23 | Stop reason: HOSPADM

## 2020-12-18 RX ORDER — ACETAMINOPHEN 325 MG/1
650 TABLET ORAL
COMMUNITY

## 2020-12-18 RX ORDER — LIDOCAINE HYDROCHLORIDE 40 MG/ML
SOLUTION TOPICAL
Status: DISCONTINUED | OUTPATIENT
Start: 2020-12-18 | End: 2020-12-23 | Stop reason: HOSPADM

## 2020-12-18 RX ORDER — HALOPERIDOL 5 MG/ML
2 INJECTION INTRAMUSCULAR ONCE
Status: DISCONTINUED | OUTPATIENT
Start: 2020-12-18 | End: 2020-12-18

## 2020-12-18 RX ORDER — SODIUM CHLORIDE 0.9 % (FLUSH) 0.9 %
5-40 SYRINGE (ML) INJECTION AS NEEDED
Status: DISCONTINUED | OUTPATIENT
Start: 2020-12-18 | End: 2020-12-23 | Stop reason: HOSPADM

## 2020-12-18 RX ORDER — MAG HYDROX/ALUMINUM HYD/SIMETH 200-200-20
30 SUSPENSION, ORAL (FINAL DOSE FORM) ORAL
Status: DISCONTINUED | OUTPATIENT
Start: 2020-12-18 | End: 2020-12-23 | Stop reason: HOSPADM

## 2020-12-18 RX ORDER — CHLORDIAZEPOXIDE HYDROCHLORIDE AND CLIDINIUM BROMIDE 5; 2.5 MG/1; MG/1
1 CAPSULE ORAL
Status: DISCONTINUED | OUTPATIENT
Start: 2020-12-18 | End: 2020-12-23 | Stop reason: HOSPADM

## 2020-12-18 RX ORDER — GUAIFENESIN 600 MG/1
1200 TABLET, EXTENDED RELEASE ORAL 2 TIMES DAILY
Status: DISCONTINUED | OUTPATIENT
Start: 2020-12-18 | End: 2020-12-19

## 2020-12-18 RX ORDER — ACETAMINOPHEN 650 MG/1
650 SUPPOSITORY RECTAL
Status: DISCONTINUED | OUTPATIENT
Start: 2020-12-18 | End: 2020-12-23 | Stop reason: HOSPADM

## 2020-12-18 RX ORDER — AMLODIPINE BESYLATE 5 MG/1
5 TABLET ORAL DAILY
Status: DISCONTINUED | OUTPATIENT
Start: 2020-12-19 | End: 2020-12-23 | Stop reason: HOSPADM

## 2020-12-18 RX ORDER — ONDANSETRON 2 MG/ML
4 INJECTION INTRAMUSCULAR; INTRAVENOUS
Status: DISCONTINUED | OUTPATIENT
Start: 2020-12-18 | End: 2020-12-23 | Stop reason: HOSPADM

## 2020-12-18 RX ORDER — SENNOSIDES 8.6 MG/1
2 TABLET ORAL
COMMUNITY

## 2020-12-18 RX ORDER — ENOXAPARIN SODIUM 100 MG/ML
40 INJECTION SUBCUTANEOUS DAILY
Status: DISCONTINUED | OUTPATIENT
Start: 2020-12-19 | End: 2020-12-23 | Stop reason: HOSPADM

## 2020-12-18 RX ORDER — HYDRALAZINE HYDROCHLORIDE 50 MG/1
50 TABLET, FILM COATED ORAL
Status: DISCONTINUED | OUTPATIENT
Start: 2020-12-18 | End: 2020-12-23 | Stop reason: HOSPADM

## 2020-12-18 RX ORDER — TIMOLOL MALEATE 5 MG/ML
1 SOLUTION/ DROPS OPHTHALMIC EVERY 12 HOURS
Status: DISCONTINUED | OUTPATIENT
Start: 2020-12-18 | End: 2020-12-23 | Stop reason: HOSPADM

## 2020-12-18 RX ORDER — DEXAMETHASONE 4 MG/1
6 TABLET ORAL DAILY
Status: DISCONTINUED | OUTPATIENT
Start: 2020-12-19 | End: 2020-12-19

## 2020-12-18 RX ORDER — POLYETHYLENE GLYCOL 3350 17 G/17G
17 POWDER, FOR SOLUTION ORAL DAILY PRN
Status: DISCONTINUED | OUTPATIENT
Start: 2020-12-18 | End: 2020-12-23 | Stop reason: HOSPADM

## 2020-12-18 RX ORDER — CARBOXYMETHYLCELLULOSE SODIUM 10 MG/ML
1 GEL OPHTHALMIC
Status: DISCONTINUED | OUTPATIENT
Start: 2020-12-18 | End: 2020-12-23 | Stop reason: HOSPADM

## 2020-12-18 RX ORDER — LOPERAMIDE HYDROCHLORIDE 2 MG/1
2 CAPSULE ORAL
Status: DISCONTINUED | OUTPATIENT
Start: 2020-12-18 | End: 2020-12-23 | Stop reason: HOSPADM

## 2020-12-18 RX ORDER — GUAIFENESIN/DEXTROMETHORPHAN 100-10MG/5
10 SYRUP ORAL
Status: DISCONTINUED | OUTPATIENT
Start: 2020-12-18 | End: 2020-12-23 | Stop reason: HOSPADM

## 2020-12-18 RX ORDER — ONDANSETRON 4 MG/1
4 TABLET, FILM COATED ORAL
COMMUNITY

## 2020-12-18 RX ORDER — PROMETHAZINE HYDROCHLORIDE 25 MG/1
12.5 TABLET ORAL
Status: DISCONTINUED | OUTPATIENT
Start: 2020-12-18 | End: 2020-12-23 | Stop reason: HOSPADM

## 2020-12-18 RX ORDER — BRIMONIDINE TARTRATE 2 MG/ML
1 SOLUTION/ DROPS OPHTHALMIC EVERY 12 HOURS
Status: DISCONTINUED | OUTPATIENT
Start: 2020-12-18 | End: 2020-12-23 | Stop reason: HOSPADM

## 2020-12-18 RX ORDER — PANTOPRAZOLE SODIUM 40 MG/1
40 TABLET, DELAYED RELEASE ORAL
Status: DISCONTINUED | OUTPATIENT
Start: 2020-12-19 | End: 2020-12-23 | Stop reason: HOSPADM

## 2020-12-18 RX ORDER — ACETAMINOPHEN 325 MG/1
650 TABLET ORAL
Status: DISCONTINUED | OUTPATIENT
Start: 2020-12-18 | End: 2020-12-23 | Stop reason: HOSPADM

## 2020-12-18 RX ORDER — TRAMADOL HYDROCHLORIDE 50 MG/1
50 TABLET ORAL
Status: DISCONTINUED | OUTPATIENT
Start: 2020-12-18 | End: 2020-12-23 | Stop reason: HOSPADM

## 2020-12-18 RX ADMIN — BRIMONIDINE TARTRATE 1 DROP: 2 SOLUTION OPHTHALMIC at 17:25

## 2020-12-18 RX ADMIN — Medication 5 ML: at 22:06

## 2020-12-18 RX ADMIN — TIMOLOL MALEATE 1 DROP: 5 SOLUTION/ DROPS OPHTHALMIC at 21:00

## 2020-12-18 RX ADMIN — TIMOLOL MALEATE 1 DROP: 5 SOLUTION/ DROPS OPHTHALMIC at 17:25

## 2020-12-18 RX ADMIN — Medication 5 ML: at 17:27

## 2020-12-18 RX ADMIN — BRIMONIDINE TARTRATE 1 DROP: 2 SOLUTION OPHTHALMIC at 21:00

## 2020-12-18 RX ADMIN — GUAIFENESIN 1200 MG: 600 TABLET ORAL at 17:25

## 2020-12-18 NOTE — H&P
HOSPITALIST H&P/CONSULT  NAME:  Alba Lo   Age:  81 y.o.  :   1935   MRN:   [id number]  PCP: Awa Berumen MD  Consulting MD:  Treatment Team: Attending Provider: Idania Noonan MD; Primary Nurse: Marcia Ott RN  HPI:   Patient is an 81yoF with PMH significant for HTN and recent allison who presents from [facility] with SOB and cough. Patient states that it has been going on for about a week. Yesterday, at [facility], she was noted to have a fever and became hypoxic, so EMS was called and brought her here. Patient is requiring 5L/m supplemental O2 currently. She denies known fevers, chills, but does have cough productive of thick sputum. Complete ROS done and is as stated in HPI or otherwise negative. Past Medical History:   Diagnosis Date    Gastrointestinal disorder     acid reflux    Hypertension     Past Medical History reviewed. Past Surgical History:   Procedure Laterality Date    HX GYN      cervical CA, hysterectomy    HX ORTHOPAEDIC      knee      Prior to Admission Medications   Prescriptions Last Dose Informant Patient Reported? Taking? amLODIPine (NORVASC) 5 mg tablet   Yes No   Sig: Take 5 mg by mouth daily. clidinium-chlordiazepoxide (LIBRAX) 5-2.5 mg per capsule   Yes No   Sig: Take 1 Cap by mouth 4 times daily (before meals and nightly). lidocaine (LIDODERM) 5 %   No No   Sig: Apply patch to the affected area for 12 hours a day and remove for 12 hours a day. olmesartan (BENICAR) 20 mg tablet   Yes No   Sig: Take 20 mg by mouth daily. omeprazole (PRILOSEC) 20 mg capsule   Yes No   Sig: Take 20 mg by mouth two (2) times a day. Indications: gastroesophageal reflux disease   traMADoL (ULTRAM) 50 mg tablet   Yes No   Sig: Take 50 mg by mouth every eight (8) hours as needed for Pain.  Indications: pain      Facility-Administered Medications: None     No Known Allergies   Social History     Tobacco Use    Smoking status: Never Smoker    Smokeless tobacco: Never Used   Substance Use Topics    Alcohol use: No       Family History reviewed and is non-contributory to current presentation. Objective:     Visit Vitals  BP (!) 161/78   Pulse 85   Temp 99.1 °F (37.3 °C)   Resp 27   Ht 5' 3\" (1.6 m)   Wt 83.9 kg (185 lb)   SpO2 94%   BMI 32.77 kg/m²      Temp (24hrs), Av.1 °F (37.3 °C), Min:99.1 °F (37.3 °C), Max:99.1 °F (37.3 °C)    Oxygen Therapy  O2 Sat (%): 94 % (20)  Pulse via Oximetry: 85 beats per minute (20)  O2 Device: Nasal cannula (20)  O2 Flow Rate (L/min): 4 l/min (20)  Physical Exam:  General:    Alert, cooperative, no distress, appears stated age. Head:   Normocephalic, without obvious abnormality, atraumatic. Nose:  Nares normal. No drainage or sinus tenderness. Lungs:   Diminished. No Wheezing or Rhonchi. No rales. Heart:   Regular rate and rhythm, no murmur, rub or gallop. Abdomen:   Soft, non-tender. Not distended. Bowel sounds normal.   Extremities: No cyanosis. No edema. No clubbing. Skin:     Texture, turgor normal.  Not Jaundiced. Neurologic: Alert and oriented x 3, no focal deficits. Data Review:   Recent Results (from the past 24 hour(s))   LACTIC ACID    Collection Time: 20  2:08 AM   Result Value Ref Range    Lactic acid 1.0 0.4 - 2.0 MMOL/L   METABOLIC PANEL, COMPREHENSIVE    Collection Time: 20  2:08 AM   Result Value Ref Range    Sodium 141 136 - 145 mmol/L    Potassium 3.3 (L) 3.5 - 5.1 mmol/L    Chloride 104 98 - 107 mmol/L    CO2 31 21 - 32 mmol/L    Anion gap 6 (L) 7 - 16 mmol/L    Glucose 114 (H) 65 - 100 mg/dL    BUN 6 (L) 8 - 23 MG/DL    Creatinine 0.72 0.6 - 1.0 MG/DL    GFR est AA >60 >60 ml/min/1.73m2    GFR est non-AA >60 >60 ml/min/1.73m2    Calcium 8.7 8.3 - 10.4 MG/DL    Bilirubin, total 0.5 0.2 - 1.1 MG/DL    ALT (SGPT) 22 12 - 65 U/L    AST (SGOT) 16 15 - 37 U/L    Alk.  phosphatase 94 50 - 136 U/L    Protein, total 7.0 6.3 - 8.2 g/dL    Albumin 2.6 (L) 3.2 - 4.6 g/dL    Globulin 4.4 (H) 2.3 - 3.5 g/dL    A-G Ratio 0.6 (L) 1.2 - 3.5     SARS-COV-2    Collection Time: 12/18/20  2:36 AM   Result Value Ref Range    Specimen source Nasopharyngeal      SARS CoV-2 PENDING    CBC WITH AUTOMATED DIFF    Collection Time: 12/18/20  2:36 AM   Result Value Ref Range    WBC 10.7 4.3 - 11.1 K/uL    RBC 4.52 4.05 - 5.2 M/uL    HGB 12.2 11.7 - 15.4 g/dL    HCT 36.6 35.8 - 46.3 %    MCV 81.0 79.6 - 97.8 FL    MCH 27.0 26.1 - 32.9 PG    MCHC 33.3 31.4 - 35.0 g/dL    RDW 15.3 (H) 11.9 - 14.6 %    PLATELET 149 071 - 113 K/uL    MPV 10.2 9.4 - 12.3 FL    ABSOLUTE NRBC 0.00 0.0 - 0.2 K/uL    DF AUTOMATED      NEUTROPHILS 73 43 - 78 %    LYMPHOCYTES 18 13 - 44 %    MONOCYTES 6 4.0 - 12.0 %    EOSINOPHILS 2 0.5 - 7.8 %    BASOPHILS 0 0.0 - 2.0 %    IMMATURE GRANULOCYTES 1 0.0 - 5.0 %    ABS. NEUTROPHILS 7.9 1.7 - 8.2 K/UL    ABS. LYMPHOCYTES 1.9 0.5 - 4.6 K/UL    ABS. MONOCYTES 0.7 0.1 - 1.3 K/UL    ABS. EOSINOPHILS 0.2 0.0 - 0.8 K/UL    ABS. BASOPHILS 0.0 0.0 - 0.2 K/UL    ABS. IMM. GRANS. 0.1 0.0 - 0.5 K/UL     Imaging /Procedures /Studies     Assessment and Plan: Active Hospital Problems    Diagnosis Date Noted    Acute respiratory failure with hypoxia ([facility] 12/18/2020    Type 2 diabetes mellitus without complication, [facility] long-term current [facility] 11/11/2020    Essential (primary) hypertension 01/24/2014     Last Assessment & Plan:   Recent labs reviewed. The patient blood pressure is stable. The patient will continue current dose of amlodipine-benazepril.          PLAN  Acute Respiratory Failure with Hypoxia  - Patient is requiring supplemental O2, does not typically use O2  - COVID-19 swabbed, result is pending  - Will start decadron, hold off on additional treatment until COVID test results  - Wean O2 as tolerated    HTN  - Stable  - Home meds    DM2  - SSI       Anticipated discharge: 2-5 days, pending clinical course    Signed By: Santa Gaytan MD December 18, 2020

## 2020-12-18 NOTE — Clinical Note
Status[de-identified] INPATIENT [101]   Type of Bed: Medical [8]   Inpatient Hospitalization Certified Necessary for the Following Reasons: 3.  Patient receiving treatment that can only be provided in an inpatient setting (further clarification in H&P documentation)   Admitting Diagnosis: Acute respiratory failure with hypoxia [facility]) [[id number]]   Admitting Physician: Tao Hoyt   Attending Physician: Tao Hoyt   Estimated Length of Stay: 3-4 Midnights   Discharge Plan[de-identified] Home with Office Follow-up

## 2020-12-18 NOTE — PROGRESS NOTES
12/18/20 1155   Dual Skin Pressure Injury Assessment   Dual Skin Pressure Injury Assessment WDL   Second Care Provider (Based on [address] [place] [address]) Laura Allison RN   No pressure sores. Patient admitted to room 609. Patient had recent Cholecystectomy. Staples remain intact to Abd. GABRIEL intact, charged and draining serous fluid. Patient resting in bed, denies any needs at this time.

## 2020-12-18 NOTE — ED PROVIDER NOTES
Patient is a 58-year-old female presents to down to the emergency department from local rehab facility. Patient with increased shortness of breath, cough which is productive of clear sputum, upper respiratory congestion and intermittent headaches. Patient denies any known fever, EMS reported the facility documented patient had fever earlier today greater than 100.5, she was given Tylenol for it. Patient with increased oxygen requirement, sent to the emerge department for evaluation. Patient was on 5 L via nasal cannula via EMS, EMS gave patient DuoNeb in route. Patient denies chest pain, nausea, vomiting, diarrhea. Past Medical History:   Diagnosis Date    Gastrointestinal disorder     acid reflux    Hypertension        Past Surgical History:   Procedure Laterality Date    HX GYN      cervical CA, hysterectomy    HX ORTHOPAEDIC      knee         No family history on file.     Social History     Socioeconomic History    Marital status:      Spouse name: Not on file    Number of children: Not on file    Years of education: Not on file    Highest education level: Not on file   Occupational History    Not on file   Social Needs    Financial resource strain: Not on file    Food insecurity     Worry: Not on file     Inability: Not on file    Transportation needs     Medical: Not on file     Non-medical: Not on file   Tobacco Use    Smoking status: Never Smoker    Smokeless tobacco: Never Used   Substance and Sexual Activity    Alcohol use: No    Drug use: No    Sexual activity: Never   Lifestyle    Physical activity     Days per week: Not on file     Minutes per session: Not on file    Stress: Not on file   Relationships    Social connections     Talks on phone: Not on file     Gets together: Not on file     Attends Rastafarian service: Not on file     Active member of club or organization: Not on file     Attends meetings of clubs or organizations: Not on file     Relationship status: Not on file    Intimate partner violence     Fear of current or ex partner: Not on file     Emotionally abused: Not on file     Physically abused: Not on file     Forced sexual activity: Not on file   Other Topics Concern    Not on file   Social History Narrative    Not on file         ALLERGIES: Patient has no known allergies. Review of Systems   Constitutional: Positive for chills and fatigue. Negative for fever. HENT: Negative for facial swelling and sore throat. Eyes: Negative for visual disturbance. Respiratory: Positive for cough. Negative for chest tightness and shortness of breath. Cardiovascular: Negative for chest pain and palpitations. Gastrointestinal: Negative for abdominal pain, diarrhea, nausea and vomiting. Genitourinary: Negative for difficulty urinating, dysuria and flank pain. Musculoskeletal: Negative for myalgias, neck pain and neck stiffness. Skin: Negative for color change. Neurological: Negative for dizziness, speech difficulty, weakness, numbness and headaches. Psychiatric/Behavioral: Negative for confusion. Vitals:    12/18/20 0131 12/18/20 0136   BP:  (!) 161/85   Pulse: 90 92   Resp:  18   Temp:  99.1 °F (37.3 °C)   SpO2: (!) 83% 94%   Weight:  83.9 kg (185 lb)   Height:  5' 3\" (1.6 m)            Physical Exam  Vitals signs and nursing note reviewed. Constitutional:       Appearance: Normal appearance. She is not ill-appearing or toxic-appearing. HENT:      Head: Normocephalic and atraumatic. Nose: Nose normal.      Mouth/Throat:      Mouth: Mucous membranes are moist.   Eyes:      Extraocular Movements: Extraocular movements intact. Neck:      Musculoskeletal: Normal range of motion. No neck rigidity. Cardiovascular:      Rate and Rhythm: Normal rate and regular rhythm. Pulses: Normal pulses. Heart sounds: Normal heart sounds. Pulmonary:      Effort: Pulmonary effort is normal. No respiratory distress.       Breath sounds: Normal breath sounds. Comments: Coarse breath sounds throughout with scattered wheezing. Abdominal:      General: Abdomen is flat. There is no distension. Palpations: Abdomen is soft. Tenderness: There is no abdominal tenderness. Comments: Drain in place to right lower quadrant, producing serosanguineous discharge. No significant tenderness to palpation. Multiple well-healing incisions, no evidence of infection, no significant tenderness. Musculoskeletal: Normal range of motion. Skin:     General: Skin is warm and dry. Neurological:      General: No focal deficit present. Mental Status: She is alert and oriented to person, place, and time. Psychiatric:         Mood and Affect: Mood normal.          MDM  Number of Diagnoses or Management Options  Hypoxia  SOB (shortness of breath)  Diagnosis management comments: Patient seen wearing appropriate PPE. Patient currently requiring 3 L via nasal cannula to maintain normal oxygenation. Patient had a documented O2 saturation of 83% on room air here in the emergency department. EKG obtained shows sinus rhythm, rate 88, , QRS 72, QTc 419, normal axis, no significant ST elevation or depression. Wandering baseline V6, limits interpretation of that lead. Repeat EKG shows no evidence of ST elevation or depression. Labs normal white count, stable H&H, normal lactic acid, Grossly normal electrolytes, normal kidney function, chest ray shows mild bibasilar atelectasis no focal consolidation or edema.        Amount and/or Complexity of Data Reviewed  Clinical lab tests: ordered and reviewed  Tests in the radiology section of CPT®: ordered and reviewed  Discussion of test results with the performing providers: yes  Discuss the patient with other providers: yes  Independent visualization of images, tracings, or specimens: yes    Risk of Complications, Morbidity, and/or Mortality  Presenting problems: moderate  Diagnostic procedures: moderate  Management options: moderate    Patient Progress  Patient progress: stable         Procedures

## 2020-12-18 NOTE — ED TRIAGE NOTES
Patient arrives via EMS from [facility] [place] with shortness of breath, wheezing. States this has been on going for a few days. States today they found her O2 sat was 88%. EMS gave combivent with some improvement in work of breathing. Patient does not wear oxygen normally. Patient is alert and oriented. Patient at facility for rehab after a procedure, patient has GABRIEL drains in place.

## 2020-12-18 NOTE — PROGRESS NOTES
Problem: Falls - Risk of  Goal: *Absence of Falls  Description: Document Po Arrieta Fall Risk and appropriate interventions in the flowsheet.   Outcome: Progressing Towards Goal  Note: Fall Risk Interventions:  Mobility Interventions: Bed/chair exit alarm    Mentation Interventions: Adequate sleep, hydration, pain control    Medication Interventions: Patient to call before getting OOB    Elimination Interventions: Call light in reach    History of Falls Interventions: Bed/chair exit alarm

## 2020-12-18 NOTE — ED NOTES
TRANSFER - OUT REPORT:    Verbal report given to Keith Litten, RN(name) on Zafar Curry  being transferred to [phone]  (unit) for routine progression of care       Report consisted of patients Situation, Background, Assessment and   Recommendations(SBAR). Information from the following report(s) SBAR, Kardex and ED Summary was reviewed with the receiving nurse. Lines:   Peripheral IV 12/18/20 Right Hand (Active)        Opportunity for questions and clarification was provided.       Patient transported with:   [email or web address]

## 2020-12-18 NOTE — PROGRESS NOTES
Med list reconciled according to paperwork received from rehab facility. Med list had additions for MD to review.  Notified MD.

## 2020-12-18 NOTE — PROGRESS NOTES
PROGRESS NOTE    I saw and examined the patient in full PPE. She was admitted after midnight with acute resp failure. COVID rule out. She feels better this evening. Still on 3. 5LNC  SOB improved. Denies cough. Plan:  Continue Decadron  Wait for COVID results. BNP slightly elevated but does not appear fluid overloaded. Wean oxygen as appropriate.     Janae Bay, DO

## 2020-12-19 LAB
ANION GAP SERPL CALC-SCNC: 8 MMOL/L (ref 7–16)
APPEARANCE UR: ABNORMAL
BILIRUB UR QL: NEGATIVE
BUN SERPL-MCNC: 9 MG/DL (ref 8–23)
CALCIUM SERPL-MCNC: 8.7 MG/DL (ref 8.3–10.4)
CHLORIDE SERPL-SCNC: 104 MMOL/L (ref 98–107)
CO2 SERPL-SCNC: 28 MMOL/L (ref 21–32)
COLOR UR: YELLOW
CREAT SERPL-MCNC: 0.67 MG/DL (ref 0.6–1)
ERYTHROCYTE [DISTWIDTH] IN BLOOD BY AUTOMATED COUNT: 15.1 % (ref 11.9–14.6)
GLUCOSE BLD STRIP.AUTO-MCNC: 110 MG/DL (ref 65–100)
GLUCOSE BLD STRIP.AUTO-MCNC: 117 MG/DL (ref 65–100)
GLUCOSE BLD STRIP.AUTO-MCNC: 141 MG/DL (ref 65–100)
GLUCOSE BLD STRIP.AUTO-MCNC: 204 MG/DL (ref 65–100)
GLUCOSE SERPL-MCNC: 111 MG/DL (ref 65–100)
GLUCOSE UR STRIP.AUTO-MCNC: NEGATIVE MG/DL
HCT VFR BLD AUTO: 34.2 % (ref 35.8–46.3)
HGB BLD-MCNC: 11.2 G/DL (ref 11.7–15.4)
HGB UR QL STRIP: NEGATIVE
KETONES UR QL STRIP.AUTO: ABNORMAL MG/DL
LEUKOCYTE ESTERASE UR QL STRIP.AUTO: NEGATIVE
MAGNESIUM SERPL-MCNC: 2.6 MG/DL (ref 1.8–2.4)
MCH RBC QN AUTO: 26.5 PG (ref 26.1–32.9)
MCHC RBC AUTO-ENTMCNC: 32.7 G/DL (ref 31.4–35)
MCV RBC AUTO: 80.9 FL (ref 79.6–97.8)
NITRITE UR QL STRIP.AUTO: NEGATIVE
NRBC # BLD: 0 K/UL (ref 0–0.2)
PH UR STRIP: 7.5 [PH] (ref 5–9)
PHOSPHATE SERPL-MCNC: 3.1 MG/DL (ref 2.3–3.7)
PLATELET # BLD AUTO: 186 K/UL (ref 150–450)
PMV BLD AUTO: 10.4 FL (ref 9.4–12.3)
POTASSIUM SERPL-SCNC: 3.4 MMOL/L (ref 3.5–5.1)
PROT UR STRIP-MCNC: NEGATIVE MG/DL
RBC # BLD AUTO: 4.23 M/UL (ref 4.05–5.2)
SODIUM SERPL-SCNC: 140 MMOL/L (ref 136–145)
SP GR UR REFRACTOMETRY: 1.02 (ref 1–1.02)
UROBILINOGEN UR QL STRIP.AUTO: 0.2 EU/DL (ref 0.2–1)
WBC # BLD AUTO: 10.6 K/UL (ref 4.3–11.1)

## 2020-12-19 PROCEDURE — 74011250637 HC RX REV CODE- 250/637: Performed by: INTERNAL MEDICINE

## 2020-12-19 PROCEDURE — 81003 URINALYSIS AUTO W/O SCOPE: CPT

## 2020-12-19 PROCEDURE — 74011250637 HC RX REV CODE- 250/637: Performed by: FAMILY MEDICINE

## 2020-12-19 PROCEDURE — 82962 GLUCOSE BLOOD TEST: CPT

## 2020-12-19 PROCEDURE — 77030038269 HC DRN EXT URIN PURWCK BARD -A

## 2020-12-19 PROCEDURE — 65270000029 HC RM PRIVATE

## 2020-12-19 PROCEDURE — 85027 COMPLETE CBC AUTOMATED: CPT

## 2020-12-19 PROCEDURE — 83735 ASSAY OF MAGNESIUM: CPT

## 2020-12-19 PROCEDURE — 84100 ASSAY OF PHOSPHORUS: CPT

## 2020-12-19 PROCEDURE — 2709999900 HC NON-CHARGEABLE SUPPLY

## 2020-12-19 PROCEDURE — 80048 BASIC METABOLIC PNL TOTAL CA: CPT

## 2020-12-19 PROCEDURE — 74011250636 HC RX REV CODE- 250/636: Performed by: FAMILY MEDICINE

## 2020-12-19 PROCEDURE — 36415 COLL VENOUS BLD VENIPUNCTURE: CPT

## 2020-12-19 PROCEDURE — 74011636637 HC RX REV CODE- 636/637: Performed by: FAMILY MEDICINE

## 2020-12-19 RX ORDER — POTASSIUM CHLORIDE 20 MEQ/1
40 TABLET, EXTENDED RELEASE ORAL
Status: COMPLETED | OUTPATIENT
Start: 2020-12-19 | End: 2020-12-19

## 2020-12-19 RX ADMIN — TIMOLOL MALEATE 1 DROP: 5 SOLUTION/ DROPS OPHTHALMIC at 09:21

## 2020-12-19 RX ADMIN — OLMESARTAN MEDOXOMIL 20 MG: 20 TABLET, FILM COATED ORAL at 09:07

## 2020-12-19 RX ADMIN — DEXAMETHASONE 6 MG: 4 TABLET ORAL at 09:07

## 2020-12-19 RX ADMIN — ENOXAPARIN SODIUM 40 MG: 40 INJECTION SUBCUTANEOUS at 09:09

## 2020-12-19 RX ADMIN — Medication 10 ML: at 05:48

## 2020-12-19 RX ADMIN — Medication 10 ML: at 21:31

## 2020-12-19 RX ADMIN — AMLODIPINE BESYLATE 5 MG: 5 TABLET ORAL at 09:08

## 2020-12-19 RX ADMIN — GUAIFENESIN 1200 MG: 600 TABLET ORAL at 09:08

## 2020-12-19 RX ADMIN — HYDRALAZINE HYDROCHLORIDE 50 MG: 50 TABLET, FILM COATED ORAL at 00:59

## 2020-12-19 RX ADMIN — GUAIFENESIN AND DEXTROMETHORPHAN 10 ML: 100; 10 SYRUP ORAL at 21:26

## 2020-12-19 RX ADMIN — TIMOLOL MALEATE 1 DROP: 5 SOLUTION/ DROPS OPHTHALMIC at 21:27

## 2020-12-19 RX ADMIN — Medication 10 ML: at 14:55

## 2020-12-19 RX ADMIN — INSULIN LISPRO 4 UNITS: 100 INJECTION, SOLUTION INTRAVENOUS; SUBCUTANEOUS at 21:30

## 2020-12-19 RX ADMIN — PANTOPRAZOLE SODIUM 40 MG: 40 TABLET, DELAYED RELEASE ORAL at 05:48

## 2020-12-19 RX ADMIN — POTASSIUM CHLORIDE 40 MEQ: 20 TABLET, EXTENDED RELEASE ORAL at 12:56

## 2020-12-19 RX ADMIN — BRIMONIDINE TARTRATE 1 DROP: 2 SOLUTION OPHTHALMIC at 21:27

## 2020-12-19 RX ADMIN — BRIMONIDINE TARTRATE 1 DROP: 2 SOLUTION OPHTHALMIC at 09:21

## 2020-12-19 NOTE — PROGRESS NOTES
CM viewed patient's chart. Patient presents with cough and increased SOB. Patient came from [facility]. CM will speak with patient to inquire about d/c plan.      Care Management Interventions  Transition of Care Consult (CM Consult): Discharge Planning  Current Support Network: Nursing Facility([facility])  Discharge Location  Discharge Placement: Unable to determine at this time

## 2020-12-19 NOTE — PROGRESS NOTES
TRANSFER - OUT REPORT:    Verbal report given to OTIS Guaman(name) on Glynn Render  being transferred to 2 floor(unit) for routine progression of care       Report consisted of patients Situation, Background, Assessment and   Recommendations(SBAR). Information from the following report(s) SBAR was reviewed with the receiving nurse. Lines:   Peripheral IV 12/18/20 Right Hand (Active)   Site Assessment Clean, dry, & intact 12/18/20 1910   Phlebitis Assessment 0 12/18/20 1910   Infiltration Assessment 0 12/18/20 1910   Dressing Status Clean, dry, & intact 12/18/20 1910   Dressing Type Tape;Transparent 12/18/20 1910   Hub Color/Line Status Flushed 12/18/20 1910        Opportunity for questions and clarification was provided.       Patient transported with:   [employer] 2 L

## 2020-12-19 NOTE — PROGRESS NOTES
12/19/20 0152   Dual Skin Pressure Injury Assessment   Dual Skin Pressure Injury Assessment WDL   Second Care Provider (Based on [address] [place] [address]) Shante Patrick, RN     Abd incision LIZETH w/staples from recent cholecystectomy, lap site dry drsg. R GABRIEL drain with old drainage, dressing changed. Pt assisted to [facility] x 2 assistance with difficulty, purewick cath placed. No other skin breakdown noted.

## 2020-12-19 NOTE — PROGRESS NOTES
TRANSFER - IN REPORT:    Verbal report received from Lani Roth RN(name) on Rob Longoria  being received from 6th floor(unit) for routine progression of care      Report consisted of patients Situation, Background, Assessment and   Recommendations(SBAR). Information from the following report(s) SBAR was reviewed with the receiving nurse. Opportunity for questions and clarification was provided. Assessment to be completed upon patients arrival to unit and care assumed.

## 2020-12-19 NOTE — PROGRESS NOTES
Hospitalist Progress Note    Patient: Flory Waters MRN: [id number]  SSN: [id number]    YOB: 1935  Age: 81 y.o. Sex: female      Admit Date: 12/18/2020    LOS: 1 day     Subjective:     81year old AAF with PMH significant for HTN and recent allison admitted on 12/18/20 from [address] [place] [address] with acute respiratory failure with SOB and cough. CXR unremarkable. COVID negative. 12/19 - She feels better today. Feels like food is getting stuck in throat. Coughs after bites. Denies CP/SOB. Review of systems negative except stated above. Objective:     Visit Vitals  /81   Pulse 87   Temp 98.9 °F (37.2 °C)   Resp 18   Ht 5' 3\" (1.6 m)   Wt 82.3 kg (181 lb 7 oz)   SpO2 93%   BMI 32.14 kg/m²      Oxygen Therapy  O2 Sat (%): 93 % (12/19/20 1246)  Pulse via Oximetry: 81 beats per minute (12/18/20 1130)  O2 Device: Nasal cannula (12/19/20 1246)  O2 Flow Rate (L/min): 3 l/min (12/19/20 1246)      Intake and Output: No intake or output data in the 24 hours ending 12/19/20 1303      Physical Exam:   GENERAL: alert, cooperative, no distress, appears stated age  EYE: conjunctivae/corneas clear. PERRL. THROAT & NECK: normal and no erythema or exudates noted. LUNG: clear to auscultation bilaterally  HEART: regular rate and rhythm, S1S2, no murmur, no JVD  ABDOMEN: soft, non-tender, non-distended. Bowel sounds normal.   EXTREMITIES:  No edema, 2+ pedal/radial pulses bilaterally  SKIN: no rash or abnormalities  NEUROLOGIC: A&Ox3. Cranial nerves 2-12 grossly intact.     Lab/Data Review:  Recent Results (from the past 24 hour(s))   GLUCOSE, POC    Collection Time: 12/18/20  1:44 PM   Result Value Ref Range    Glucose (POC) 95 65 - 100 mg/dL   PTT    Collection Time: 12/18/20  1:55 PM   Result Value Ref Range    aPTT 35.3 (H) 24.1 - 35.1 SEC   PROTHROMBIN TIME + INR    Collection Time: 12/18/20  1:55 PM   Result Value Ref Range    Prothrombin time 14.8 (H) 12.5 - 14.7 sec    INR 1.1     PROCALCITONIN Collection Time: 12/18/20  1:55 PM   Result Value Ref Range    Procalcitonin <0.05 ng/mL   D DIMER    Collection Time: 12/18/20  1:55 PM   Result Value Ref Range    D DIMER 17.91 (H) <0.56 ug/ml(FEU)   C REACTIVE PROTEIN, QT    Collection Time: 12/18/20  1:55 PM   Result Value Ref Range    C-Reactive protein 17.3 (H) 0.0 - 0.9 mg/dL   MAGNESIUM    Collection Time: 12/18/20  1:55 PM   Result Value Ref Range    Magnesium 2.8 (H) 1.8 - 2.4 mg/dL   NT-PRO BNP    Collection Time: 12/18/20  1:55 PM   Result Value Ref Range    NT pro-BNP 2,566 (H) <450 PG/ML   PHOSPHORUS    Collection Time: 12/18/20  1:55 PM   Result Value Ref Range    Phosphorus 3.4 2.3 - 3.7 MG/DL   GLUCOSE, POC    Collection Time: 12/18/20  4:17 PM   Result Value Ref Range    Glucose (POC) 112 (H) 65 - 100 mg/dL   GLUCOSE, POC    Collection Time: 12/18/20  8:40 PM   Result Value Ref Range    Glucose (POC) 110 (H) 65 - 022 mg/dL   METABOLIC PANEL, BASIC    Collection Time: 12/19/20  5:36 AM   Result Value Ref Range    Sodium 140 136 - 145 mmol/L    Potassium 3.4 (L) 3.5 - 5.1 mmol/L    Chloride 104 98 - 107 mmol/L    CO2 28 21 - 32 mmol/L    Anion gap 8 7 - 16 mmol/L    Glucose 111 (H) 65 - 100 mg/dL    BUN 9 8 - 23 MG/DL    Creatinine 0.67 0.6 - 1.0 MG/DL    GFR est AA >60 >60 ml/min/1.73m2    GFR est non-AA >60 >60 ml/min/1.73m2    Calcium 8.7 8.3 - 10.4 MG/DL   CBC W/O DIFF    Collection Time: 12/19/20  5:36 AM   Result Value Ref Range    WBC 10.6 4.3 - 11.1 K/uL    RBC 4.23 4.05 - 5.2 M/uL    HGB 11.2 (L) 11.7 - 15.4 g/dL    HCT 34.2 (L) 35.8 - 46.3 %    MCV 80.9 79.6 - 97.8 FL    MCH 26.5 26.1 - 32.9 PG    MCHC 32.7 31.4 - 35.0 g/dL    RDW 15.1 (H) 11.9 - 14.6 %    PLATELET 377 935 - 804 K/uL    MPV 10.4 9.4 - 12.3 FL    ABSOLUTE NRBC 0.00 0.0 - 0.2 K/uL   MAGNESIUM    Collection Time: 12/19/20  5:36 AM   Result Value Ref Range    Magnesium 2.6 (H) 1.8 - 2.4 mg/dL   PHOSPHORUS    Collection Time: 12/19/20  5:36 AM   Result Value Ref Range Phosphorus 3.1 2.3 - 3.7 MG/DL   URINALYSIS W/ RFLX MICROSCOPIC    Collection Time: 12/19/20  5:53 AM   Result Value Ref Range    Color YELLOW      Appearance HAZY      Specific gravity 1.016 1.001 - 1.023      pH (UA) 7.5 5.0 - 9.0      Protein Negative NEG mg/dL    Glucose Negative mg/dL    Ketone TRACE (A) NEG mg/dL    Bilirubin Negative NEG      Blood Negative NEG      Urobilinogen 0.2 0.2 - 1.0 EU/dL    Nitrites Negative NEG      Leukocyte Esterase Negative NEG     GLUCOSE, POC    Collection Time: 12/19/20  7:22 AM   Result Value Ref Range    Glucose (POC) 117 (H) 65 - 100 mg/dL   GLUCOSE, POC    Collection Time: 12/19/20 11:18 AM   Result Value Ref Range    Glucose (POC) 110 (H) 65 - 100 mg/dL       SARS-CoV-2 Lab Results  \"Novel Coronavirus\" Test: No results found for: COV2NT   \"Emergent Disease\" Test: No results found for: EDPR  \"SARS-COV-2\" Test: No results found for: XGCOVT  \"Precision Labs\" Test: No results found for: RSLT  Rapid Test: No results found for: COVR        Imaging:  Nm Hepatobiliary Duct Scan    Result Date: 11/30/2020  Nuclear Medicine Hepatobiliary Patency Study CLINICAL INDICATION: Abdominal pain with gallstones COMPARISON: Bilateral carotid ultrasound November 28, 2020 and CT abdomen and pelvis November 27, 2020 Radiopharmaceutical: 6.2 mCi Tc-99m Choletec IV. Findings:  Serial imaging of the abdomen shows prompt and homogeneous uptake of tracer by the liver. Activity is rapidly excreted into the biliary system with no activity seen in the gallbladder by one hour. Patient was injected with 2 mg of morphine and imaged for an additional half hour without activity seen in the gallbladder. By the end of imaging the CBD and small bowel activity are seen. IMPRESSION:  Obstructed cystic duct which can be seen with cholecystitis. [facility] Upper Gi    Result Date: 11/30/2020  Upper GI series History: Abdominal pain. Hiatal hernia with possible volvulus.  Technique: Single contrast Gastrografin was used to evaluate the esophagus, stomach and duodenum. Comparison: CT abdomen and pelvis November 27, 2020 Findings: Preliminary KUB demonstrates no significant abnormality. . Patient swallowed barium without difficulty. Esophagus appeared normal. Redemonstrated is a hiatal hernia. The GE junction is in normal anatomic location with the fundus, body, antrum in a thoracic location. Enteric contrast quickly fills the entire nondistended stomach and empties into the duodenum. After the stomach becomes progressively distended with contrast, there is limited emptying into the duodenum. This results in moderate esophageal reflux. Fluoroscopy time: 3.1 min. Total number of fluoro images obtained: 20     Impression: Type IV paraesophageal hernia. There is rapid transit of contrast through the intrathoracic stomach into the intra-abdominal duodenum and small bowel on initial imaging. When the stomach becomes progressively more distended with contrast, limited flow of contrast into the duodenum and small bowel is seen. Ct Abd Pelv W Cont    Result Date: 11/27/2020  CT abdomen and pelvis done with IV contrast using ER protocol November 27, 2020 Reference exam: None Indication: Abdomen pain with nausea vomiting Technique: Radiation dose reduction techniques were used for this study using one or more of the following: automated exposure control; adjustment of mA and/or kV (according to patient size);  iterative reconstruction. 5 mm axial images were taken through the abdomen and pelvis using IV contrast using ER protocol. 100 cc Isovue 370 IV contrast was administered to better evaluate the abdominal and pelvic contents. Abdomen: The lung bases show a left-sided hiatal hernia with some adjacent compressive atelectasis.  Air-filled loop of bowel seen between the liver and the right diaphragm, with diaphragm slips seen along the posterior lateral right liver, the gallbladder shows multiple gallstones and is somewhat distended with some adjacent pericholecystic fluid, the pancreas, spleen, adrenals appear normal. Right kidney appears normal, left kidney midpole shows a 8mm 43 Hounsfield unit minimally complex cystic lesion with the midpole laterally showing an exophytic 7 mm 15 Hounsfield unit probable simple cyst. There is no intraperitoneal free air, nor abnormal adenopathy seen. Patient has a high riding cecum, the appendix is not seen. Pelvis: There are multiple diverticula within an elongated sigmoid colon, the bladder and pelvic sidewalls appear normal, the uterus is absent. There is no intraperitoneal free air, ascites, nor abnormal adenopathy seen. IMPRESSION: 1. 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 2. Gallstones and some pericholecystic fluid, if cholecystitis is suspected dedicated ultrasound should be considered. 3. There does appear to be a tiny common duct stone distally all below the duct does not appear dilated with a stone measuring 3 mm. 4. Simple cyst left kidney with probable complex cyst left kidney if imaging confirmation is desired nonemergent ultrasound should be considered. 5. Multiple diverticula without evidence of active diverticulitis. Initial impression was called to Dr. Mario Miller in the ER at 4:49 PM November 27, 2020. Us Abd Comp    Result Date: 11/28/2020  ABDOMINAL  ULTRASOUND INDICATION: Epigastric pain COMPARISON: CT dated 11/27/2020 Transabdominal imaging of the upper abdomen was performed. FINDINGS: LIVER: 13.7 cm. Normal echogenicity. No masses. BILE DUCTS: No intrahepatic bile duct dilatation. CBD diameter = 6 mm. GALLBLADDER: Mild gallbladder distention and multiple gallstones. No significant wall thickening. PANCREAS: Normal. SPLEEN: Normal. RIGHT KIDNEY: 10.1 cm. No mass or hydronephrosis. LEFT KIDNEY: 10.8 cm. 2 small renal cysts. No hydronephrosis. ABDOMINAL AORTA AND IVC: Normal in size. ASCITES: No free fluid. IMPRESSION: 1. Distended gallbladder with multiple gallstones. ,  Concerning for early acute cholecystitis based on CT appearance. 2.  Small simple cysts in the left kidney. Xr Chest Port    Result Date: 12/18/2020  Portable chest xray  COMPARISON: November 27, 2020 CLINICAL HISTORY: Shortness of breath FINDINGS: Cardiac mediastinal contour appears within normal limits. Mild bibasilar opacities, likely atelectasis. No pneumothorax, pulmonary edema or large pleural effusion. There is stable large hiatal hernia. Surrounding bones are stable. IMPRESSION: 1. Mild bibasilar atelectasis. No focal pulmonary consolidation or pulmonary edema. 2. Stable large hiatal hernia. Xr Chest Port    Result Date: 11/27/2020  Chest X-ray INDICATION: Epigastric pain A portable AP view of the chest was obtained. FINDINGS: There is a retrocardiac hiatal hernia. There are no pulmonary infiltrates. The heart size is normal.  The bony thorax is intact. IMPRESSION: Large hiatal hernia, grossly stable compared to prior chest x-rays. No results found for this visit on 12/18/20. Cultures: All Micro Results     None          Assessment/Plan:     Principal Problem:    Acute respiratory failure with hypoxia ([address] (12/18/2020)  - Unsure etiology - aspiration?  - CXR with bibasilar atelectasis  - COVID negative  - Was satting 83% on room air with me in room  - Stopped Decadron  - BNP slightly elevated    Active Problems:    Hypokalemia (11/27/2020)  - Likely due to steroids  - Replace PO  - Check BMP daily      Type 2 diabetes mellitus without complication, without long-term current use of insulin ([facility] (11/11/2020)  - Stable  - Continue Humalog SSI      Essential (primary) hypertension (1/24/2014)  - Continue Benicar  - Continue Norvasc    Today's Plan: Consult SLP.      DIET REGULAR    DVT Prophylaxis: Lovenox    Discharge Plan: Back to [facility] [employer]  in 2-3 days      Signed By: Tisha Simpson DO     December 19, 2020

## 2020-12-19 NOTE — PROGRESS NOTES
Problem: Falls - Risk of  Goal: *Absence of Falls  Description: Document Veatrice Marker Fall Risk and appropriate interventions in the flowsheet.   Outcome: Progressing Towards Goal  Note: Fall Risk Interventions:  Mobility Interventions: Communicate number of staff needed for ambulation/transfer, Patient to call before getting OOB    Mentation Interventions: Adequate sleep, hydration, pain control, Door open when patient unattended, Evaluate medications/consider consulting pharmacy    Medication Interventions: Patient to call before getting OOB, Teach patient to arise slowly    Elimination Interventions: Call light in reach, Patient to call for help with toileting needs, Toileting schedule/hourly rounds    History of Falls Interventions: Bed/chair exit alarm

## 2020-12-20 LAB
ANION GAP SERPL CALC-SCNC: 5 MMOL/L (ref 7–16)
BUN SERPL-MCNC: 15 MG/DL (ref 8–23)
CALCIUM SERPL-MCNC: 9.2 MG/DL (ref 8.3–10.4)
CHLORIDE SERPL-SCNC: 104 MMOL/L (ref 98–107)
CO2 SERPL-SCNC: 29 MMOL/L (ref 21–32)
CREAT SERPL-MCNC: 0.86 MG/DL (ref 0.6–1)
ERYTHROCYTE [DISTWIDTH] IN BLOOD BY AUTOMATED COUNT: 15.4 % (ref 11.9–14.6)
GLUCOSE BLD STRIP.AUTO-MCNC: 119 MG/DL (ref 65–100)
GLUCOSE BLD STRIP.AUTO-MCNC: 129 MG/DL (ref 65–100)
GLUCOSE BLD STRIP.AUTO-MCNC: 149 MG/DL (ref 65–100)
GLUCOSE BLD STRIP.AUTO-MCNC: 158 MG/DL (ref 65–100)
GLUCOSE SERPL-MCNC: 136 MG/DL (ref 65–100)
HCT VFR BLD AUTO: 34.4 % (ref 35.8–46.3)
HGB BLD-MCNC: 11.5 G/DL (ref 11.7–15.4)
MCH RBC QN AUTO: 26.9 PG (ref 26.1–32.9)
MCHC RBC AUTO-ENTMCNC: 33.4 G/DL (ref 31.4–35)
MCV RBC AUTO: 80.4 FL (ref 79.6–97.8)
NRBC # BLD: 0 K/UL (ref 0–0.2)
PLATELET # BLD AUTO: 220 K/UL (ref 150–450)
PMV BLD AUTO: 10.5 FL (ref 9.4–12.3)
POTASSIUM SERPL-SCNC: 4.3 MMOL/L (ref 3.5–5.1)
RBC # BLD AUTO: 4.28 M/UL (ref 4.05–5.2)
SODIUM SERPL-SCNC: 138 MMOL/L (ref 136–145)
WBC # BLD AUTO: 8.1 K/UL (ref 4.3–11.1)

## 2020-12-20 PROCEDURE — 2709999900 HC NON-CHARGEABLE SUPPLY

## 2020-12-20 PROCEDURE — 77030038269 HC DRN EXT URIN PURWCK BARD -A

## 2020-12-20 PROCEDURE — 74011250637 HC RX REV CODE- 250/637: Performed by: FAMILY MEDICINE

## 2020-12-20 PROCEDURE — 74011636637 HC RX REV CODE- 636/637: Performed by: FAMILY MEDICINE

## 2020-12-20 PROCEDURE — 85027 COMPLETE CBC AUTOMATED: CPT

## 2020-12-20 PROCEDURE — 36415 COLL VENOUS BLD VENIPUNCTURE: CPT

## 2020-12-20 PROCEDURE — 92610 EVALUATE SWALLOWING FUNCTION: CPT

## 2020-12-20 PROCEDURE — 82962 GLUCOSE BLOOD TEST: CPT

## 2020-12-20 PROCEDURE — 80048 BASIC METABOLIC PNL TOTAL CA: CPT

## 2020-12-20 PROCEDURE — 74011250636 HC RX REV CODE- 250/636: Performed by: FAMILY MEDICINE

## 2020-12-20 PROCEDURE — 65270000029 HC RM PRIVATE

## 2020-12-20 RX ADMIN — TIMOLOL MALEATE 1 DROP: 5 SOLUTION/ DROPS OPHTHALMIC at 09:11

## 2020-12-20 RX ADMIN — Medication 10 ML: at 22:12

## 2020-12-20 RX ADMIN — ENOXAPARIN SODIUM 40 MG: 40 INJECTION SUBCUTANEOUS at 09:09

## 2020-12-20 RX ADMIN — PANTOPRAZOLE SODIUM 40 MG: 40 TABLET, DELAYED RELEASE ORAL at 05:54

## 2020-12-20 RX ADMIN — Medication 10 ML: at 05:55

## 2020-12-20 RX ADMIN — AMLODIPINE BESYLATE 5 MG: 5 TABLET ORAL at 09:09

## 2020-12-20 RX ADMIN — OLMESARTAN MEDOXOMIL 20 MG: 20 TABLET, FILM COATED ORAL at 09:09

## 2020-12-20 RX ADMIN — INSULIN LISPRO 2 UNITS: 100 INJECTION, SOLUTION INTRAVENOUS; SUBCUTANEOUS at 22:11

## 2020-12-20 RX ADMIN — Medication 10 ML: at 14:40

## 2020-12-20 RX ADMIN — BRIMONIDINE TARTRATE 1 DROP: 2 SOLUTION OPHTHALMIC at 22:12

## 2020-12-20 RX ADMIN — BRIMONIDINE TARTRATE 1 DROP: 2 SOLUTION OPHTHALMIC at 09:10

## 2020-12-20 RX ADMIN — TIMOLOL MALEATE 1 DROP: 5 SOLUTION/ DROPS OPHTHALMIC at 22:12

## 2020-12-20 NOTE — PROGRESS NOTES
Problem: Dysphagia (Adult)  Goal: *Acute Goals and Plan of Care (Insert Text)  Description: STG: Pt will demonstrate zero signs/sx of aspiration with pureed textures with thin liquids with 90% accuracy during all meals. STG: Pt will complete a Modified barium swallow study with zero signs/sx of aspiration  with 100% accuracy during swallow study. LTG: Pt will demonstrate zero signs/sx of aspiration with least restrictive diet with 100% accuracy during all meals. Outcome: Progressing Towards Goal   SPEECH LANGUAGE PATHOLOGY: DYSPHAGIA- Initial Assessment    NAME/AGE/GENDER: Celeste Bryson is a 81 y.o. female  DATE: 12/20/2020  PRIMARY DIAGNOSIS: Acute respiratory failure with hypoxia ([facility] [J96.01]       ICD-10: Treatment Diagnosis: R13.12 Dysphagia, Oropharyngeal Phase    RECOMMENDATIONS   DIET:   PO:  Pureed  Liquids:  regular thin    MEDICATIONS: With liquid     ASPIRATION PRECAUTIONS  Slow rate of intake  Small bites/sips  Upright at 90 degrees during meal  Alternate bite/drink     COMPENSATORY STRATEGIES/MODIFICATIONS  Alternate liquids/solids     RECOMMENDATIONS for CONTINUED SPEECH THERAPY:   YES: Anticipate need for ongoing speech therapy during this hospitalization. ASSESSMENT   Patient presents with mild oropharyngeal phase dysphagia. Pt requesting pureed textures. Pt tolerated PO trials with no overt signs/sx of aspiration. Recommend pureed textures with thin liquids with medications as tolerated. CONTINUATION OF SKILLED SERVICES/MEDICAL NECESSITY:  Patient continues to require skilled intervention due to dysphagia. EDUCATION:  Recommendations discussed with Patient and RN  REHABILITATION POTENTIAL FOR STATED GOALS: Fair    PLAN    FREQUENCY/DURATION: Continue to follow patient 2 times a week for duration of hospital stay to address above goals.     - Recommendations for next treatment session: Next treatment will address diet tolerance/PO trials    SUBJECTIVE   alert    Oxygen Device: nasal canula   Pain: Pain Scale 1: Numeric (0 - 10)    History of Present Injury/Illness: Ms. Ava Wheeler  has a past medical history of Gastrointestinal disorder and Hypertension. . She also  has a past surgical history that includes hx gyn and hx orthopaedic. PRECAUTIONS/ALLERGIES: Patient has no known allergies. Problem List:  (Impairments causing functional limitations):  dysphagia    Previous Dysphagia: YES feel like gets stuck  Diet Prior to Evaluation: regular/thin    Orientation:  Person  Place  Time  Situation    Cognitive-Linguistic Screening:  Speech Production:   clear  Expressive Language:  Appears WFL  Receptive Language:  Appears WFL  Cognition:   Appears WFL  Prior Level of Function: unknown  Recommendations: Given results of screening, patient appears to be functioning at baseline. No acute assessment of speech, language, or cognition warranted. OBJECTIVE   Oral Motor:   Labial: No impairment  Dentition: Limited  Oral Hygiene: Dry  Lingual: No impairment    Swallow evaluation:   Patient consumed trials of thin liquid via cup and straw, pureed and mixed with no overt signs/sx of aspiration. Pt spit out soft solid. Pt requesting pureed textures with thin liquids. Pt is safe for mechanical soft textures if/when she is ready. Speech clear. Cognition appears WFL. Will follow for dysphagia management.     Tool Used: Dysphagia Outcome and Severity Scale (HARLAN)    Score Comments   Normal Diet  [] 7 With no strategies or extra time needed   Functional Swallow  [] 6 May have mild oral or pharyngeal delay   Mild Dysphagia  [] 5 Which may require one diet consistency restricted    Mild-Moderate Dysphagia  [x] 4 With 1-2 diet consistencies restricted   Moderate Dysphagia  [] 3 With 2 or more diet consistencies restricted   Moderate-Severe Dysphagia  [] 2 With partial PO strategies (trials with ST only)   Severe Dysphagia  [] 1 With inability to tolerate any PO safely      Score:  Initial: 4 Most Recent: (Date 12/20/20 )   Interpretation of Tool: The Dysphagia Outcome and Severity Scale (HARLAN) is a simple, easy-to-use, 7-point scale developed to systematically rate the functional severity of dysphagia based on objective assessment and make recommendations for diet level, independence level, and type of nutrition. Current Medications:   No current facility-administered medications on file prior to encounter. Current Outpatient Medications on File Prior to Encounter   Medication Sig Dispense Refill    brimonidine-timoloL (Combigan) 0.2-0.5 % drop ophthalmic solution Administer 1 Drop to left eye two (2) times a day.  polyvinyl alcohol-povidon,PF, (Refresh Classic, PF,) 1.4-0.6 % ophthalmic solution Administer 1 Drop to both eyes as needed.  senna (Senna) 8.6 mg tablet Take 2 Tabs by mouth nightly.  acetaminophen (TylenoL) 325 mg tablet Take 650 mg by mouth every four (4) hours as needed for Pain.  ondansetron hcl (Zofran) 4 mg tablet Take 4 mg by mouth every six (6) hours as needed for Nausea or Nausea or Vomiting.  olmesartan (BENICAR) 20 mg tablet Take 20 mg by mouth daily.  amLODIPine (NORVASC) 5 mg tablet Take 5 mg by mouth daily.  omeprazole (PRILOSEC) 20 mg capsule Take 20 mg by mouth two (2) times a day. Indications: gastroesophageal reflux disease      traMADoL (ULTRAM) 50 mg tablet Take 50 mg by mouth every eight (8) hours as needed for Pain. Indications: pain      lidocaine (LIDODERM) 5 % Apply patch to the affected area for 12 hours a day and remove for 12 hours a day. 20 Each 0    clidinium-chlordiazepoxide (LIBRAX) 5-2.5 mg per capsule Take 1 Cap by mouth 4 times daily (before meals and nightly).           INTERDISCIPLINARY COLLABORATION: RN    After treatment position/precautions:  Upright in bed  RN notified    Total Treatment Duration:   Time In: 0900  Time Out: [address] MA/CCC/SLP

## 2020-12-20 NOTE — PROGRESS NOTES
Hospitalist Progress Note    Patient: Shahnaz Jerome MRN: [id number]  SSN: [id number]    YOB: 1935  Age: 81 y.o. Sex: female      Admit Date: 12/18/2020    LOS: 2 days     Subjective:     81year old AAF with PMH significant for HTN and recent allison admitted on 12/18/20 from [facility] with acute respiratory failure with SOB and cough. CXR unremarkable. COVID negative. 12/20 - She continues to feel better today. Did well with SLP. Denies CP/SOB. Review of systems negative except stated above. Objective:     Visit Vitals  /79   Pulse 75   Temp 97.5 °F (36.4 °C)   Resp 17   Ht 5' 3\" (1.6 m)   Wt 82.3 kg (181 lb 7 oz)   SpO2 95%   BMI 32.14 kg/m²      Oxygen Therapy  O2 Sat (%): 95 % (12/20/20 0708)  Pulse via Oximetry: 81 beats per minute (12/18/20 1130)  O2 Device: Nasal cannula (12/19/20 1246)  O2 Flow Rate (L/min): 3 l/min (12/19/20 1246)      Intake and Output:     Intake/Output Summary (Last 24 hours) at 12/20/2020 1007  Last data filed at 12/20/2020 0452  Gross per 24 hour   Intake    Output 100 ml   Net -100 ml         Physical Exam:   GENERAL: alert, cooperative, no distress, appears stated age  EYE: conjunctivae/corneas clear. PERRL. THROAT & NECK: normal and no erythema or exudates noted. LUNG: clear to auscultation bilaterally  HEART: regular rate and rhythm, S1S2, no murmur, no JVD  ABDOMEN: soft, non-tender, non-distended. Bowel sounds normal.   EXTREMITIES:  No edema, 2+ pedal/radial pulses bilaterally  SKIN: no rash or abnormalities  NEUROLOGIC: A&Ox3. Cranial nerves 2-12 grossly intact.     Lab/Data Review:  Recent Results (from the past 24 hour(s))   GLUCOSE, POC    Collection Time: 12/19/20 11:18 AM   Result Value Ref Range    Glucose (POC) 110 (H) 65 - 100 mg/dL   GLUCOSE, POC    Collection Time: 12/19/20  4:08 PM   Result Value Ref Range    Glucose (POC) 141 (H) 65 - 100 mg/dL   GLUCOSE, POC    Collection Time: 12/19/20  9:03 PM   Result Value Ref Range Glucose (POC) 204 (H) 65 - 049 mg/dL   METABOLIC PANEL, BASIC    Collection Time: 12/20/20  4:46 AM   Result Value Ref Range    Sodium 138 136 - 145 mmol/L    Potassium 4.3 3.5 - 5.1 mmol/L    Chloride 104 98 - 107 mmol/L    CO2 29 21 - 32 mmol/L    Anion gap 5 (L) 7 - 16 mmol/L    Glucose 136 (H) 65 - 100 mg/dL    BUN 15 8 - 23 MG/DL    Creatinine 0.86 0.6 - 1.0 MG/DL    GFR est AA >60 >60 ml/min/1.73m2    GFR est non-AA >60 >60 ml/min/1.73m2    Calcium 9.2 8.3 - 10.4 MG/DL   CBC W/O DIFF    Collection Time: 12/20/20  4:46 AM   Result Value Ref Range    WBC 8.1 4.3 - 11.1 K/uL    RBC 4.28 4.05 - 5.2 M/uL    HGB 11.5 (L) 11.7 - 15.4 g/dL    HCT 34.4 (L) 35.8 - 46.3 %    MCV 80.4 79.6 - 97.8 FL    MCH 26.9 26.1 - 32.9 PG    MCHC 33.4 31.4 - 35.0 g/dL    RDW 15.4 (H) 11.9 - 14.6 %    PLATELET 347 975 - 548 K/uL    MPV 10.5 9.4 - 12.3 FL    ABSOLUTE NRBC 0.00 0.0 - 0.2 K/uL   GLUCOSE, POC    Collection Time: 12/20/20  7:10 AM   Result Value Ref Range    Glucose (POC) 129 (H) 65 - 100 mg/dL       SARS-CoV-2 Lab Results  \"Novel Coronavirus\" Test: No results found for: COV2NT   \"Emergent Disease\" Test: No results found for: EDPR  \"SARS-COV-2\" Test: No results found for: XGCOVT  \"Precision Labs\" Test: No results found for: RSLT  Rapid Test: No results found for: COVR        Imaging:  Nm Hepatobiliary Duct Scan    Result Date: 11/30/2020  Nuclear Medicine Hepatobiliary Patency Study CLINICAL INDICATION: Abdominal pain with gallstones COMPARISON: Bilateral carotid ultrasound November 28, 2020 and CT abdomen and pelvis November 27, 2020 Radiopharmaceutical: 6.2 mCi Tc-99m Choletec IV. Findings:  Serial imaging of the abdomen shows prompt and homogeneous uptake of tracer by the liver. Activity is rapidly excreted into the biliary system with no activity seen in the gallbladder by one hour. Patient was injected with 2 mg of morphine and imaged for an additional half hour without activity seen in the gallbladder.   By the end of imaging the CBD and small bowel activity are seen. IMPRESSION:  Obstructed cystic duct which can be seen with cholecystitis. [facility] Upper Gi    Result Date: 11/30/2020  Upper GI series History: Abdominal pain. Hiatal hernia with possible volvulus. Technique: Single contrast Gastrografin was used to evaluate the esophagus, stomach and duodenum. Comparison: CT abdomen and pelvis November 27, 2020 Findings: Preliminary KUB demonstrates no significant abnormality. . Patient swallowed barium without difficulty. Esophagus appeared normal. Redemonstrated is a hiatal hernia. The GE junction is in normal anatomic location with the fundus, body, antrum in a thoracic location. Enteric contrast quickly fills the entire nondistended stomach and empties into the duodenum. After the stomach becomes progressively distended with contrast, there is limited emptying into the duodenum. This results in moderate esophageal reflux. Fluoroscopy time: 3.1 min. Total number of fluoro images obtained: 20     Impression: Type IV paraesophageal hernia. There is rapid transit of contrast through the intrathoracic stomach into the intra-abdominal duodenum and small bowel on initial imaging. When the stomach becomes progressively more distended with contrast, limited flow of contrast into the duodenum and small bowel is seen. Ct Abd Pelv W Cont    Result Date: 11/27/2020  CT abdomen and pelvis done with IV contrast using ER protocol November 27, 2020 Reference exam: None Indication: Abdomen pain with nausea vomiting Technique: Radiation dose reduction techniques were used for this study using one or more of the following: automated exposure control; adjustment of mA and/or kV (according to patient size);  iterative reconstruction. 5 mm axial images were taken through the abdomen and pelvis using IV contrast using ER protocol.  100 cc Isovue 370 IV contrast was administered to better evaluate the abdominal and pelvic contents. Abdomen: The lung bases show a left-sided hiatal hernia with some adjacent compressive atelectasis. Air-filled loop of bowel seen between the liver and the right diaphragm, with diaphragm slips seen along the posterior lateral right liver, the gallbladder shows multiple gallstones and is somewhat distended with some adjacent pericholecystic fluid, the pancreas, spleen, adrenals appear normal. Right kidney appears normal, left kidney midpole shows a 8mm 43 Hounsfield unit minimally complex cystic lesion with the midpole laterally showing an exophytic 7 mm 15 Hounsfield unit probable simple cyst. There is no intraperitoneal free air, nor abnormal adenopathy seen. Patient has a high riding cecum, the appendix is not seen. Pelvis: There are multiple diverticula within an elongated sigmoid colon, the bladder and pelvic sidewalls appear normal, the uterus is absent. There is no intraperitoneal free air, ascites, nor abnormal adenopathy seen. IMPRESSION: 1. 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 2. Gallstones and some pericholecystic fluid, if cholecystitis is suspected dedicated ultrasound should be considered. 3. There does appear to be a tiny common duct stone distally all below the duct does not appear dilated with a stone measuring 3 mm. 4. Simple cyst left kidney with probable complex cyst left kidney if imaging confirmation is desired nonemergent ultrasound should be considered. 5. Multiple diverticula without evidence of active diverticulitis. Initial impression was called to Dr. Santiago Mckeon in the ER at 4:49 PM November 27, 2020. Us Abd Comp    Result Date: 11/28/2020  ABDOMINAL  ULTRASOUND INDICATION: Epigastric pain COMPARISON: CT dated 11/27/2020 Transabdominal imaging of the upper abdomen was performed. FINDINGS: LIVER: 13.7 cm. Normal echogenicity. No masses. BILE DUCTS: No intrahepatic bile duct dilatation. CBD diameter = 6 mm.  GALLBLADDER: Mild gallbladder distention and multiple gallstones. No significant wall thickening. PANCREAS: Normal. SPLEEN: Normal. RIGHT KIDNEY: 10.1 cm. No mass or hydronephrosis. LEFT KIDNEY: 10.8 cm. 2 small renal cysts. No hydronephrosis. ABDOMINAL AORTA AND IVC: Normal in size. ASCITES: No free fluid. IMPRESSION: 1. Distended gallbladder with multiple gallstones. ,  Concerning for early acute cholecystitis based on CT appearance. 2.  Small simple cysts in the left kidney. Xr Chest Port    Result Date: 12/18/2020  Portable chest xray  COMPARISON: November 27, 2020 CLINICAL HISTORY: Shortness of breath FINDINGS: Cardiac mediastinal contour appears within normal limits. Mild bibasilar opacities, likely atelectasis. No pneumothorax, pulmonary edema or large pleural effusion. There is stable large hiatal hernia. Surrounding bones are stable. IMPRESSION: 1. Mild bibasilar atelectasis. No focal pulmonary consolidation or pulmonary edema. 2. Stable large hiatal hernia. Xr Chest Port    Result Date: 11/27/2020  Chest X-ray INDICATION: Epigastric pain A portable AP view of the chest was obtained. FINDINGS: There is a retrocardiac hiatal hernia. There are no pulmonary infiltrates. The heart size is normal.  The bony thorax is intact. IMPRESSION: Large hiatal hernia, grossly stable compared to prior chest x-rays. No results found for this visit on 12/18/20. Cultures:   All Micro Results     None          Assessment/Plan:     Principal Problem:    Acute respiratory failure with hypoxia ([address] (12/18/2020)  - Unsure etiology - aspiration?  - CXR with bibasilar atelectasis  - COVID negative  - Was satting 83% on room air with me in room on 12/19  - Stopped Decadron  - BNP slightly elevated    Active Problems:    Hypokalemia (11/27/2020)  - Likely due to steroids  - Resolved  - Check BMP daily      Type 2 diabetes mellitus without complication, without long-term current use of insulin ([address] (11/11/2020)  - Stable  - Continue Humalog SSI      Essential (primary) hypertension (1/24/2014)  - Continue Benicar  - Continue Norvasc    Today's Plan: Continue current plan    DIET PUREED    DVT Prophylaxis: Lovenox    Discharge Plan: Back to [facility] tomorrow AM      Signed By: Tamy De La Cruz, DO     December 20, 2020

## 2020-12-20 NOTE — PROGRESS NOTES
END OF SHIFT NOTE:    INTAKE/OUTPUT  12/19 0701 - 12/20 0700  In: -   Out: 100 [Urine:100]  Voiding: YES  Catheter: NO  Drain:   Jacobo-Coulter Drain 12/01/20 Right; Inferior Abdomen (Active)   Output (ml) 0 ml 12/20/20 0452       External Female Catheter 12/19/20 (Active)   Site Assessment Clean, dry, & intact 12/19/20 1434   Repositioned Yes 12/19/20 1434   Perineal Care Yes 12/19/20 1434   Wick Changed Yes 12/19/20 1434   Suction Canister/Tubing Changed Yes 12/19/20 1434   Urine Output (mL) 100 ml 12/20/20 0452               Flatus: Patient does have flatus present. Stool:  0 occurrences. Characteristics:  Stool Assessment  Stool Color: Brown  Stool Appearance: Soft  Stool Amount: Medium  Stool Source/Status: Rectum    Emesis: 0 occurrences. Characteristics:        VITAL SIGNS  Patient Vitals for the past 12 hrs:   Temp Pulse Resp BP SpO2   12/20/20 0400 97.5 °F (36.4 °C) 71 16 136/83 96 %   12/19/20 2352 97.7 °F (36.5 °C) 69 16 123/75 97 %   12/19/20 1945 97.5 °F (36.4 °C) 75 16 134/80 98 %       Pain Assessment  Pain Intensity 1: 0 (12/19/20 0738)        Patient Stated Pain Goal: 0    Ambulating  No    Shift report given to oncoming nurse at the bedside.     Prisca Ramey RN

## 2020-12-21 ENCOUNTER — APPOINTMENT (OUTPATIENT)
Dept: NUCLEAR MEDICINE | Age: 85
DRG: 189 | End: 2020-12-21
Attending: NURSE PRACTITIONER
Payer: MEDICARE

## 2020-12-21 LAB
ANION GAP SERPL CALC-SCNC: 6 MMOL/L (ref 7–16)
BUN SERPL-MCNC: 20 MG/DL (ref 8–23)
CALCIUM SERPL-MCNC: 9 MG/DL (ref 8.3–10.4)
CHLORIDE SERPL-SCNC: 104 MMOL/L (ref 98–107)
CO2 SERPL-SCNC: 28 MMOL/L (ref 21–32)
CREAT SERPL-MCNC: 0.81 MG/DL (ref 0.6–1)
ERYTHROCYTE [DISTWIDTH] IN BLOOD BY AUTOMATED COUNT: 15.4 % (ref 11.9–14.6)
GLUCOSE BLD STRIP.AUTO-MCNC: 101 MG/DL (ref 65–100)
GLUCOSE BLD STRIP.AUTO-MCNC: 103 MG/DL (ref 65–100)
GLUCOSE BLD STRIP.AUTO-MCNC: 96 MG/DL (ref 65–100)
GLUCOSE SERPL-MCNC: 92 MG/DL (ref 65–100)
HCT VFR BLD AUTO: 33.9 % (ref 35.8–46.3)
HGB BLD-MCNC: 11 G/DL (ref 11.7–15.4)
MCH RBC QN AUTO: 26.4 PG (ref 26.1–32.9)
MCHC RBC AUTO-ENTMCNC: 32.4 G/DL (ref 31.4–35)
MCV RBC AUTO: 81.5 FL (ref 79.6–97.8)
NRBC # BLD: 0 K/UL (ref 0–0.2)
PLATELET # BLD AUTO: 245 K/UL (ref 150–450)
PMV BLD AUTO: 10.2 FL (ref 9.4–12.3)
POTASSIUM SERPL-SCNC: 3.7 MMOL/L (ref 3.5–5.1)
RBC # BLD AUTO: 4.16 M/UL (ref 4.05–5.2)
SODIUM SERPL-SCNC: 138 MMOL/L (ref 136–145)
WBC # BLD AUTO: 9.6 K/UL (ref 4.3–11.1)

## 2020-12-21 PROCEDURE — A9537 TC99M MEBROFENIN: HCPCS

## 2020-12-21 PROCEDURE — 82962 GLUCOSE BLOOD TEST: CPT

## 2020-12-21 PROCEDURE — 85027 COMPLETE CBC AUTOMATED: CPT

## 2020-12-21 PROCEDURE — 74011250636 HC RX REV CODE- 250/636: Performed by: FAMILY MEDICINE

## 2020-12-21 PROCEDURE — 65270000029 HC RM PRIVATE

## 2020-12-21 PROCEDURE — 80048 BASIC METABOLIC PNL TOTAL CA: CPT

## 2020-12-21 PROCEDURE — 2709999900 HC NON-CHARGEABLE SUPPLY

## 2020-12-21 PROCEDURE — 74011250637 HC RX REV CODE- 250/637: Performed by: FAMILY MEDICINE

## 2020-12-21 PROCEDURE — 36415 COLL VENOUS BLD VENIPUNCTURE: CPT

## 2020-12-21 PROCEDURE — 77030008031

## 2020-12-21 RX ORDER — SODIUM CHLORIDE 0.9 % (FLUSH) 0.9 %
10 SYRINGE (ML) INJECTION
Status: COMPLETED | OUTPATIENT
Start: 2020-12-21 | End: 2020-12-21

## 2020-12-21 RX ADMIN — Medication 5 ML: at 22:00

## 2020-12-21 RX ADMIN — BRIMONIDINE TARTRATE 1 DROP: 2 SOLUTION OPHTHALMIC at 21:36

## 2020-12-21 RX ADMIN — OLMESARTAN MEDOXOMIL 20 MG: 20 TABLET, FILM COATED ORAL at 08:53

## 2020-12-21 RX ADMIN — TIMOLOL MALEATE 1 DROP: 5 SOLUTION/ DROPS OPHTHALMIC at 08:54

## 2020-12-21 RX ADMIN — Medication 10 ML: at 16:30

## 2020-12-21 RX ADMIN — Medication 10 ML: at 05:59

## 2020-12-21 RX ADMIN — ENOXAPARIN SODIUM 40 MG: 40 INJECTION SUBCUTANEOUS at 08:53

## 2020-12-21 RX ADMIN — AMLODIPINE BESYLATE 5 MG: 5 TABLET ORAL at 08:53

## 2020-12-21 RX ADMIN — ACETAMINOPHEN 650 MG: 325 TABLET, FILM COATED ORAL at 21:35

## 2020-12-21 RX ADMIN — TIMOLOL MALEATE 1 DROP: 5 SOLUTION/ DROPS OPHTHALMIC at 21:36

## 2020-12-21 RX ADMIN — BRIMONIDINE TARTRATE 1 DROP: 2 SOLUTION OPHTHALMIC at 08:54

## 2020-12-21 RX ADMIN — PANTOPRAZOLE SODIUM 40 MG: 40 TABLET, DELAYED RELEASE ORAL at 05:58

## 2020-12-21 NOTE — PROGRESS NOTES
Patient is from [facility] - checked with [facility] with [facility] to see if patient can return. Awaiting response and will update. Per [facility] - patient can return today. Dr. Clark Salmeron stated surgery will see patient before she discharges to potentially remove drain that was placed. Will update with transport time after d/c is scheduled. Patient will go to room 303 @ [facility] - report #: [phone]. Awaiting surgery to see and will schedule transport.

## 2020-12-21 NOTE — PROGRESS NOTES
PROGRESS NOTE    I was called by general surgery about the patient's GABRIEL drain with bilious drainage. Discharge cancelled for today. Check HIDA scan. Rest of plan per surgery.     Garrison , DO

## 2020-12-21 NOTE — PROGRESS NOTES
END OF SHIFT NOTE:    INTAKE/OUTPUT  12/20 0701 - 12/21 0700  In: -   Out: 700 [Urine:700]  Voiding: YES  Catheter: NO  Drain:   Jacobo-Coulter Drain 12/01/20 Right; Inferior Abdomen (Active)   Site Assessment Clean, dry, & intact 12/20/20 1330   Dressing Status Clean, dry, & intact 12/20/20 1330   Status Clamped 12/20/20 1330   Drainage Color Serous 12/20/20 1330   Output (ml) 0 ml 12/20/20 2027       External Female Catheter 12/19/20 (Active)   Site Assessment Clean, dry, & intact 12/20/20 1803   Repositioned Yes 12/20/20 1803   Perineal Care No 12/20/20 1803   Wick Changed No 12/20/20 1803   Suction Canister/Tubing Changed No 12/20/20 1803   Urine Output (mL) 100 ml 12/20/20 1803               Flatus: Patient does have flatus present. Stool:  1 occurrences. Characteristics:  Stool Assessment  Stool Color: Brown  Stool Appearance: Soft  Stool Amount: Large  Stool Source/Status: Rectum    Emesis: 0 occurrences. Characteristics:        VITAL SIGNS  Patient Vitals for the past 12 hrs:   Temp Pulse Resp BP SpO2   12/21/20 0417 97.4 °F (36.3 °C) 67 16 115/70 96 %   12/20/20 2240 97.7 °F (36.5 °C) 79 18 123/78 96 %   12/20/20 1957 97.9 °F (36.6 °C) 80 18 116/72 98 %       Pain Assessment  Pain Intensity 1: 0 (12/20/20 1330)        Patient Stated Pain Goal: 0    Ambulating  Yes few steps in room    Shift report given to oncoming nurse at the bedside.     Kong Dietrich RN

## 2020-12-21 NOTE — PROGRESS NOTES
H&P/Consult Note/Progress Note/Office Note:   Radha Henson  MRN: [id number]  :1935  Age:85 y.o.    HPI: Radha Henson is a 81 y.o. female who is s/p lap cholecystectomy for acute calculous cholecystitis on 20. She also has a large mixed hiatal and paraesophageal hernia dating back at least to 2016   from studies at [facility] where she was followed and no surgery recommended. She came to the ER on 20 complaining of a 2-day history of pain that she states was primarily involving both of her flanks radiating to her epigastric region as well as the upper chest.    She specifically says she does not think the pain started in her epigastric region or her chest.    The pain was constant and progressive. Nothing in particular made it better or worse. She had associated nausea or vomiting. She was seen in an urgent treatment center and sent to the ER for further evaluation. While in the ER she is found to be significantly hypertensive with a blood pressure 175/99. Her troponin was markedly elevated at 56.7. WBC 17.3k;  LFTs and lipase were normal.  Lactic acid was also normal at 1.0. Urinalysis  Results for Jarret Samples (MRN [id number]) as of 2020 19:29   Ref. Range 2020 17:33   Epithelial cells Latest Ref Range: 0 /hpf 0   Mucus Latest Ref Range: 0 /lpf 0   WBC Latest Ref Range: 0 /hpf 0-3   RBC Latest Ref Range: 0 /hpf 0   Bacteria Latest Ref Range: 0 /hpf 0   Crystals, urine Latest Ref Range: 0 /LPF 0   Casts Latest Ref Range: 0 /lpf 0   Other observations Latest Units:   RESULTS VERIFIED MANUALLY           She has a h/o GERD and prior hysterectomy.   Her large hiatal hernia has been known since at least  and has been imaged and evaluated multiple times at [facility] (now [facility])   She was evaluated at [facility] with esophagogram and CT imaging and she reports that the surgeon told her that she was not a surgical candidate for the hiatal hernia  and that her operative risks related to hiatal hernia repair were too high to proceed. She is unsure of her remote history but reports she thinks she had ovarian cancer in the 1950s   and had a hysterectomy followed by chemotherapy and radiation treatment. 10/14/16 CT chest (at [facility])  Impression:  Stable small pulmonary nodule in the right middle lobe.  Indeterminate.  Consider CT chest surveillance 6 months. No thoracic adenopathy. Moderate hiatal hernia. Gallstones. 4/23/18 CT chest (at [facility])  Impression:  Stable 5 mm nodule in the right middle lobe for 2 years. Benign in nature. No thoracic adenopathy. Large hiatal hernia. Multiple gallstones        11/27/18 esophagram (at [facility])  Large hiatal hernia. This is a mixed type hiatal hernia with the GE junction above the diaphragm, however, there is also a paraesophageal component involving the gastric fundus and body. Free GE reflux is demonstrated into the distal  2/3rds of the esophagus, however, there is no associated esophageal ulceration, erosions or mass lesion. There is no esophageal stenosis. A 13 mm diameter barium tablet passed rapidly to the esophagus to the stomach without obstruction. .    IMPRESSION:  1. Large mixed sliding and paraesophageal type hiatal hernia with a portion of the gastric fundus and gastric body in the lower chest.  2.  Extensive gastroesophageal reflux without evidence of associated esophageal ulceration or mass. .         11/27/20 portable CXR  IMPRESSION: Large hiatal hernia, grossly stable compared to prior chest x-rays.       11/27/20 EKG  NSR; Prolonged QT   Abnormal ECG   when compared with ECG of 08-DEC-2017 09:14, QT has lengthened       11/27/20 CT abd/pelvis with IV contrast  Abdomen: The lung bases show a left-sided hiatal hernia with some adjacent  compressive atelectasis.  Air-filled loop of bowel seen between the liver and the  right diaphragm, with diaphragm slips seen along the posterior lateral right  liver, the gallbladder shows multiple gallstones and is somewhat distended with  some adjacent pericholecystic fluid, the pancreas, spleen, adrenals appear  normal. Right kidney appears normal, left kidney midpole shows a 8mm 43  Hounsfield unit minimally complex cystic lesion with the midpole laterally  showing an exophytic 7 mm 15 Hounsfield unit probable simple cyst. There is no  intraperitoneal free air, nor abnormal adenopathy seen. Patient has a high  riding cecum, the appendix is not seen.     Pelvis: There are multiple diverticula within an elongated sigmoid colon, the  bladder and pelvic sidewalls appear normal, the uterus is absent. There is no  intraperitoneal free air, ascites, nor abnormal adenopathy seen.     IMPRESSION:  1. 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 2. Gallstones and some pericholecystic fluid, if cholecystitis is suspected dedicated ultrasound should be considered. 3. There does appear to be a tiny common duct stone distally all below the duct does not appear dilated with a stone measuring 3 mm. 4. Simple cyst left kidney with probable complex cyst left kidney if imaging confirmation is desired nonemergent US should be considered. 5. Multiple diverticula without evidence of active diverticulitis.       11/28/20 echo  EF estimated 60-65%. No regional wall motion abnormalities. Wall thickness was normal. Left ventricular diastolic function parameters were normal. Avg E/e': 9.28.     RIGHT VENTRICLE: The size was normal. Systolic function was normal.  Estimated peak pressure was in the range of 25-30 mmHg. LEFT ATRIUM: Size was normal.  RIGHT ATRIUM: Size was normal.    SUMMARY:  -  Left ventricle: Systolic function was normal. EF 60-65%. No regional wall motion abnormalities. -  Inferior vena cava, hepatic veins: The respirophasic change in diameter was more than 50%. -  Tricuspid valve: There was mild regurgitation.           11/28/20 US abd  FINDINGS:   LIVER: 13.7 cm. Normal echogenicity. No masses. BILE DUCTS: No intrahepatic bile duct dilatation. CBD diameter = 6 mm. GALLBLADDER: Mild gallbladder distention and multiple gallstones. No significant wall thickening. PANCREAS: Normal.  SPLEEN: Normal.     RIGHT KIDNEY: 10.1 cm. No mass or hydronephrosis. LEFT KIDNEY: 10.8 cm. 2 small renal cysts. No hydronephrosis. ABDOMINAL AORTA AND IVC: Normal in size. ASCITES: No free fluid.     IMPRESSION:   1. Distended gallbladder with multiple gallstones. ,  Concerning for early acute cholecystitis based on CT appearance. 2.  Small simple cysts in the left kidney. 11/30/20 Gastrograffin UGI  Patient swallowed barium without difficulty. Esophagus appeared normal.  Redemonstrated is a hiatal hernia. The GE junction is in normal anatomic  location with the fundus, body, antrum in a thoracic location. Enteric contrast  quickly fills the entire nondistended stomach and empties into the duodenum. After the stomach becomes progressively distended with contrast, there is  limited emptying into the duodenum. This results in moderate esophageal reflux.       Impression:   Type IV paraesophageal hernia. There is rapid transit of contrast through the intrathoracic stomach into the intra-abdominal duodenum and small bowel on initial imaging. When the stomach becomes progressively more distended with contrast, limited flow of contrast into the duodenum and small bowel is seen.          11/30/20 HIDA  Prompt and homogeneous uptake of tracer by the liver. Activity is rapidly excreted into the biliary system with no activity seen in the gallbladder by one hour. Patient was injected with 2 mg of morphine and imaged for an additional half hour without activity seen in the gallbladder.     By the end of imaging the CBD and small bowel activity are seen.       IMPRESSION:    Obstructed cystic duct which can be seen with cholecystitis. Additonal hx:  11/28/20 echo pending; IV Abx for possible cholecystitis;  WBC improving; lactic acid normal; LFTs normal; HIDA Monday to look for GB filling  11/29/20 abd pain persists; comes and goes;no SOB; no CP;  lactic acid normal; wbc coming down; UGI today; HIDA in am; cardiology thinks troponin elevation related to HTN/demand ischemia  11/30/20 Intermittent epigastric pain; WBC 9.3k; lactic acid normal   12/1/20  RUQ pain; HIDA show non-vis of GB suggestive of cholecystitis; UGI as above with no obstruction but slow emptying; OR for lap cholecystectomy today  12/2/20 POD1 LC;  complaining of inability to void and string sensation she needs to go; LFTs normal; Foster drain serosang; Wheeler today  12/3/20 POD2 LC; feeling better; AF WBC/LFTs normal start sips of clears  12/4/20 POD3 LC 1/10 incisional pain; LFTs normal clear liquid diet; await SNF placement  12/5/20 POD4 LC 1/10 incisional pain; LFTs normal clear liquid diet; await SNF placement  12/6/20 POD5 LC 1/10 incisional pain; LFTs normal Full liquid diet; await SNF placement. K+3.4  12/21/20 She was scheduled for post op visit today in the office so we were asked by the hospitalist to see her since she was admitted. She is currently admitted secondary to hypoxia. She was treated conservatively and suppose to discharge back to her LTC facility today. On exam, she denied n/v. No difficulty eating. She reported some pain with inspiration. Her RUQ drain appears bilious.      Past Medical History:   Diagnosis Date    Gastrointestinal disorder     acid reflux    Hypertension      Past Surgical History:   Procedure Laterality Date    HX GYN      cervical CA, hysterectomy    HX ORTHOPAEDIC      knee     Current Facility-Administered Medications   Medication Dose Route Frequency    influenza vaccine 2020-21 (6 mos+)(PF) (FLUARIX/FLULAVAL/FLUZONE QUAD) injection 0.5 mL  0.5 mL IntraMUSCular PRIOR TO DISCHARGE    clindinium-chlordiazePOXIDE (LIBRAX) capsule 1 Cap (Patient Supplied)  1 Cap Oral AC&HS    pantoprazole (PROTONIX) tablet 40 mg  40 mg Oral ACB    traMADoL (ULTRAM) tablet 50 mg  50 mg Oral Q8H PRN    olmesartan (BENICAR) tablet 20 mg  20 mg Oral DAILY    amLODIPine (NORVASC) tablet 5 mg  5 mg Oral DAILY    sodium chloride (NS) flush 5-40 mL  5-40 mL IntraVENous Q8H    sodium chloride (NS) flush 5-40 mL  5-40 mL IntraVENous PRN    acetaminophen (TYLENOL) tablet 650 mg  650 mg Oral Q6H PRN    Or    acetaminophen (TYLENOL) suppository 650 mg  650 mg Rectal Q6H PRN    polyethylene glycol (MIRALAX) packet 17 g  17 g Oral DAILY PRN    promethazine (PHENERGAN) tablet 12.5 mg  12.5 mg Oral Q6H PRN    Or    ondansetron (ZOFRAN) injection 4 mg  4 mg IntraVENous Q6H PRN    enoxaparin (LOVENOX) injection 40 mg  40 mg SubCUTAneous DAILY    guaiFENesin-dextromethorphan (ROBITUSSIN DM) 100-10 mg/5 mL syrup 10 mL  10 mL Oral Q4H PRN    HYDROcodone-homatropine (HYCODAN) 5-1.5 mg/5 mL (5 mL) oral solution 5 mL  5 mL Oral Q4H PRN    loperamide (IMODIUM) capsule 2 mg  2 mg Oral Q4H PRN    lidocaine (XYLOCAINE) 4 % (40 mg/mL) topical solution   Aerosolization Q4H PRN    metoprolol tartrate (LOPRESSOR) tablet 50 mg  50 mg Oral Q12H PRN    hydrALAZINE (APRESOLINE) tablet 50 mg  50 mg Oral QID PRN    alum-mag hydroxide-simeth (MYLANTA) oral suspension 30 mL  30 mL Oral Q4H PRN    insulin lispro (HUMALOG) injection   SubCUTAneous AC&HS    brimonidine (ALPHAGAN) 0.2 % ophthalmic solution 1 Drop  1 Drop Left Eye Q12H    And    timolol (TIMOPTIC) 0.5 % ophthalmic solution 1 Drop  1 Drop Left Eye Q12H    carboxymethylcellulose sodium (REFRESH LIQUIGEL) 1 % ophthalmic solution 1 Drop  1 Drop Both Eyes QID PRN     Patient has no known allergies.   Social History     Socioeconomic History    Marital status:      Spouse name: Not on file    Number of children: Not on file    Years of education: Not on file    Highest education level: Not on file   Tobacco Use    Smoking status: Never Smoker    Smokeless tobacco: Never Used   Substance and Sexual Activity    Alcohol use: No    Drug use: No    Sexual activity: Never     Social History     Tobacco Use   Smoking Status Never Smoker   Smokeless Tobacco Never Used     No family history on file. ROS: The patient has no difficulty with chest pain or shortness of breath. No fever or chills. Comprehensive review of systems was otherwise unremarkable except as noted above. Physical Exam:   Visit Vitals  /76   Pulse 72   Temp 97.4 °F (36.3 °C)   Resp 16   Ht 5' 3\" (1.6 m)   Wt 181 lb 7 oz (82.3 kg)   SpO2 93%   BMI 32.14 kg/m²     Vitals:    12/20/20 2240 12/21/20 0417 12/21/20 0743 12/21/20 1200   BP: 123/78 115/70 125/72 120/76   Pulse: 79 67 67 72   Resp: 18 16 16 16   Temp: 97.7 °F (36.5 °C) 97.4 °F (36.3 °C) 97.6 °F (36.4 °C) 97.4 °F (36.3 °C)   SpO2: 96% 96% 98% 93%   Weight:       Height:         No intake/output data recorded. 12/19 1901 - 12/21 0700  In: -   Out: 800 [Urine:800]    Constitutional: Alert, oriented, cooperative patient in no acute distress; appears stated age    Eyes: Sclera are clear. EOMs intact  ENMT: no external lesions gross hearing normal; no obvious neck masses, no ear or lip lesions, nares normal  CV: RRR. Normal perfusion  Resp: No JVD. Breathing is  non-labored; no audible wheezing. GI: Removed staples;abd soft and non-distended; non tender; no epigastric pain;  Foster drain bile tinged. Musculoskeletal: unremarkable with normal function. No embolic signs or cyanosis.    Neuro:  Oriented; moves all 4; no focal deficits  Psychiatric: normal affect and mood, no memory impairment    Recent vitals (if inpt):  Patient Vitals for the past 24 hrs:   BP Temp Pulse Resp SpO2   12/21/20 1200 120/76 97.4 °F (36.3 °C) 72 16 93 %   12/21/20 0743 125/72 97.6 °F (36.4 °C) 67 16 98 %   12/21/20 0417 115/70 97.4 °F (36.3 °C) 67 16 96 % 12/20/20 2240 123/78 97.7 °F (36.5 °C) 79 18 96 %   12/20/20 1957 116/72 97.9 °F (36.6 °C) 80 18 98 %   12/20/20 1615 124/75 97.3 °F (36.3 °C) 72 18 97 %       Labs:  Recent Labs     12/21/20  0538 12/18/20  1355 12/18/20  1355   WBC 9.6   < >  --    HGB 11.0*   < >  --       < >  --       < >  --    K 3.7   < >  --       < >  --    CO2 28   < >  --    BUN 20   < >  --    CREA 0.81   < >  --    GLU 92   < >  --    PTP  --   --  14.8*   INR  --   --  1.1   APTT  --   --  35.3*    < > = values in this interval not displayed. Lab Results   Component Value Date/Time    WBC 9.6 12/21/2020 05:38 AM    HGB 11.0 (L) 12/21/2020 05:38 AM    PLATELET 495 54/87/6976 05:38 AM    Sodium 138 12/21/2020 05:38 AM    Potassium 3.7 12/21/2020 05:38 AM    Chloride 104 12/21/2020 05:38 AM    CO2 28 12/21/2020 05:38 AM    BUN 20 12/21/2020 05:38 AM    Creatinine 0.81 12/21/2020 05:38 AM    Glucose 92 12/21/2020 05:38 AM    INR 1.1 12/18/2020 01:55 PM    aPTT 35.3 (H) 12/18/2020 01:55 PM    Bilirubin, total 0.5 12/18/2020 02:08 AM    ALT (SGPT) 22 12/18/2020 02:08 AM    Alk. phosphatase 94 12/18/2020 02:08 AM    Lipase 72 (L) 11/27/2020 02:01 PM       CT Results  (Last 48 hours)    None        chest X-ray      I reviewed recent labs, recent radiologic studies, and pertinent records including other doctor notes if needed. I independently reviewed radiology images for studies I described above or studies I have ordered.    Admission date (for inpatients): 12/18/2020   * No surgery found *  * No surgery found *    ASSESSMENT/PLAN:  Problem List  Date Reviewed: 12/1/2020          Codes Class Noted    * (Principal) Acute respiratory failure with hypoxia [facility]) ICD-10-CM: J96.01  ICD-9-CM: 518.81  12/18/2020        Large hiatal hernia (mixed sliding and paraesophageal dating back to 2016) ICD-10-CM: K44.9  ICD-9-CM: 553.3  11/27/2020        Non-intractable vomiting with nausea ICD-10-CM: R11.2  ICD-9-CM: 787.01 11/27/2020        Epigastric pain ICD-10-CM: R10.13  ICD-9-CM: 789.06  11/27/2020        Elevated troponin ICD-10-CM: R77.8  ICD-9-CM: 790.6  11/27/2020        Uncontrolled hypertension ICD-10-CM: I10  ICD-9-CM: 401.9  11/27/2020        Acute calculous cholecystitis ICD-10-CM: K80.00  ICD-9-CM: 574.00  11/27/2020    Overview Addendum 12/7/2020  7:36 AM by Francine Pal MD     12/1/20 s/p lap cholecystectomy; Dr Reji Araiza: ACUTE AND [address]; CHOLELITHIASIS. Electronically signed out on 12/3/2020 12:19 by Silvia Penaloza. Columba Albert MD              Abnormal EKG ICD-10-CM: R94.31  ICD-9-CM: 794.31  11/27/2020        Chest pain ICD-10-CM: R07.9  ICD-9-CM: 786.50  11/27/2020        Hypokalemia ICD-10-CM: E87.6  ICD-9-CM: 276.8  11/27/2020        Type 2 diabetes mellitus without complication, without long-term current use of insulin ([facility]) ICD-10-CM: E11.9  ICD-9-CM: 250.00  11/11/2020        Essential (primary) hypertension ICD-10-CM: I10  ICD-9-CM: 401.9  1/24/2014    Overview Signed 12/18/2020  4:08 AM by Carlos Gray MD     Last Assessment & Plan:   Recent labs reviewed. The patient blood pressure is stable. The patient will continue current dose of amlodipine-benazepril. Principal Problem:    Acute respiratory failure with hypoxia ([facility] [address] (12/18/2020)    Active Problems:    Hypokalemia (11/27/2020)      Type 2 diabetes mellitus without complication, without long-term current use of insulin ([facility] [address] (11/11/2020)      Essential (primary) hypertension (1/24/2014)      Overview: Last Assessment & Plan:       Recent labs reviewed. The patient blood pressure is stable. The patient       will continue current dose of amlodipine-benazepril. Acute calculous cholecystitis suggested by signs, symptoms, and HIDA scan results  She is s/p lap cholecystectomy for severe acute calculous cholecystitis on 12/1/20.   IOC not possible as a result of friable ischemic cystic duct    She reports no problems with eating, bowel movements, voiding, or their wound, no ongoing postoperative problems.     LFTs normal post-op  Drain bile tinged on exam; will order HIDA to r/o leak  Further input per Dr. Elfrieda Paget, NP

## 2020-12-21 NOTE — DISCHARGE SUMMARY
Hospitalist Discharge Summary     Patient ID:  Fidelia Cordova  [id number]  29 y.o.  1935  Admit date: 12/18/2020  Discharge date: 12/21/2020  Attending: Heather Hartman DO  PCP:  Mabel Loco MD  Treatment Team: Attending Provider: Heather Hartman DO; Care Manager: Shannon Valdivia; Utilization Review: Gregorio Sadler RN; Consulting Provider: Emanuel Vargas NP    Principal Diagnosis Acute respiratory failure with hypoxia [facility])    Hospital Problems as of 12/21/2020 Date Reviewed: 12/1/2020          Codes Class Noted - Resolved POA    * (Principal) Acute respiratory failure with hypoxia [facility]) ICD-10-CM: J96.01  ICD-9-CM: 518.81  12/18/2020 - Present Yes        Hypokalemia ICD-10-CM: E87.6  ICD-9-CM: 276.8  11/27/2020 - Present Yes        Type 2 diabetes mellitus without complication, without long-term current use of insulin ([facility] [address] ICD-10-CM: E11.9  ICD-9-CM: 250.00  11/11/2020 - Present Yes        Essential (primary) hypertension ICD-10-CM: I10  ICD-9-CM: 401.9  1/24/2014 - Present Yes    Overview Signed 12/18/2020  4:08 AM by Carlos Gray MD     Last Assessment & Plan:   Recent labs reviewed. The patient blood pressure is stable. The patient will continue current dose of amlodipine-benazepril. Hospital Course:  Please refer to the admission H&P for details of presentation. In summary, the patient is a 81year old AAF with PMH significant for HTN and recent allison admitted on 12/18/20 from [address] [place] [address] with acute respiratory failure with SOB and cough. CXR unremarkable. COVID negative. SLP saw her and was not concerned for aspiration. She improved with no further intervention. She remained stable, was weaned to room air, and discharged back to [facility].     Significant Diagnostic Studies:    Labs: Results:       Chemistry Recent Labs     12/21/20  0538 12/20/20  0446 12/19/20  0536   GLU 92 136* 111*    138 140   K 3.7 4.3 3.4*    104 104   CO2 28 29 28   BUN 20 15 9 CREA 0.81 0.86 0.67   CA 9.0 9.2 8.7   AGAP 6* 5* 8      CBC w/Diff Recent Labs     12/21/20  0538 12/20/20  0446 12/19/20  0536   WBC 9.6 8.1 10.6   RBC 4.16 4.28 4.23   HGB 11.0* 11.5* 11.2*   HCT 33.9* 34.4* 34.2*    220 186      Cardiac Enzymes No results for input(s): CPK, CKND1, CHRISTIN in the last 72 hours. No lab exists for component: CKRMB, TROIP   Coagulation Recent Labs     12/18/20  1355   PTP 14.8*   INR 1.1   APTT 35.3*       Lipid Panel No results found for: CHOL, CHOLPOCT, CHOLX, CHLST, CHOLV, 796644, HDL, HDLP, LDL, LDLC, DLDLP, 964904, VLDLC, VLDL, TGLX, TRIGL, TRIGP, TGLPOCT, CHHD, CHHDX   BNP No results for input(s): BNPP in the last 72 hours. Liver Enzymes No results for input(s): TP, ALB, TBIL, AP in the last 72 hours. No lab exists for component: SGOT, GPT, DBIL   Thyroid Studies No results found for: T4, T3U, TSH, TSHEXT         Imaging:  Xr Chest Port    Result Date: 12/18/2020  IMPRESSION: 1. Mild bibasilar atelectasis. No focal pulmonary consolidation or pulmonary edema. 2. Stable large hiatal hernia. Microbiology/Cultures: All Micro Results     None          Discharge Exam:  Visit Vitals  /72   Pulse 67   Temp 97.6 °F (36.4 °C)   Resp 16   Ht 5' 3\" (1.6 m)   Wt 82.3 kg (181 lb 7 oz)   SpO2 98%   BMI 32.14 kg/m²     General appearance: alert, cooperative, no distress, appears stated age  Lungs: clear to auscultation bilaterally  Heart: regular rate and rhythm, S1, S2 normal, no murmur, click, rub or gallop  Abdomen: soft, non-tender. Bowel sounds normal. No masses,  no organomegaly  Extremities: no cyanosis or edema  Neurologic: Grossly normal    Disposition: SNF  Discharge Condition: stable  Patient Instructions:   Current Discharge Medication List      CONTINUE these medications which have NOT CHANGED    Details   brimonidine-timoloL (Combigan) 0.2-0.5 % drop ophthalmic solution Administer 1 Drop to left eye two (2) times a day.       polyvinyl alcohol-TESS cooley, (Refresh Classic, PF,) 1.4-0.6 % ophthalmic solution Administer 1 Drop to both eyes as needed. senna (Senna) 8.6 mg tablet Take 2 Tabs by mouth nightly. acetaminophen (TylenoL) 325 mg tablet Take 650 mg by mouth every four (4) hours as needed for Pain. ondansetron hcl (Zofran) 4 mg tablet Take 4 mg by mouth every six (6) hours as needed for Nausea or Nausea or Vomiting. olmesartan (BENICAR) 20 mg tablet Take 20 mg by mouth daily. amLODIPine (NORVASC) 5 mg tablet Take 5 mg by mouth daily. omeprazole (PRILOSEC) 20 mg capsule Take 20 mg by mouth two (2) times a day. Indications: gastroesophageal reflux disease      traMADoL (ULTRAM) 50 mg tablet Take 50 mg by mouth every eight (8) hours as needed for Pain. Indications: pain      lidocaine (LIDODERM) 5 % Apply patch to the affected area for 12 hours a day and remove for 12 hours a day. Qty: 20 Each, Refills: 0      clidinium-chlordiazepoxide (LIBRAX) 5-2.5 mg per capsule Take 1 Cap by mouth 4 times daily (before meals and nightly).              Activity: Activity as tolerated  Diet: Regular Pureed Diet  Wound Care: As directed    Follow-up  ·   PCP in one week  Time spent to discharge patient 35 minutes  Signed:  Rigoberto Hoffmann DO  12/21/2020  10:37 AM

## 2020-12-21 NOTE — PROGRESS NOTES
Update from surgery:  Patient will not be dc'd today - needs further workup. Notified Mel Bowie with [facility].

## 2020-12-22 ENCOUNTER — APPOINTMENT (OUTPATIENT)
Dept: GENERAL RADIOLOGY | Age: 85
DRG: 189 | End: 2020-12-22
Attending: INTERNAL MEDICINE
Payer: MEDICARE

## 2020-12-22 LAB
ANION GAP SERPL CALC-SCNC: 6 MMOL/L (ref 7–16)
ATRIAL RATE: 72 BPM
BASOPHILS # BLD: 0.1 K/UL (ref 0–0.2)
BASOPHILS NFR BLD: 1 % (ref 0–2)
BUN SERPL-MCNC: 17 MG/DL (ref 8–23)
CALCIUM SERPL-MCNC: 8.9 MG/DL (ref 8.3–10.4)
CALCULATED P AXIS, ECG09: 57 DEGREES
CALCULATED R AXIS, ECG10: -44 DEGREES
CALCULATED T AXIS, ECG11: 49 DEGREES
CHLORIDE SERPL-SCNC: 104 MMOL/L (ref 98–107)
CO2 SERPL-SCNC: 29 MMOL/L (ref 21–32)
CREAT SERPL-MCNC: 0.67 MG/DL (ref 0.6–1)
DIAGNOSIS, 93000: NORMAL
DIFFERENTIAL METHOD BLD: ABNORMAL
EOSINOPHIL # BLD: 0.1 K/UL (ref 0–0.8)
EOSINOPHIL NFR BLD: 2 % (ref 0.5–7.8)
ERYTHROCYTE [DISTWIDTH] IN BLOOD BY AUTOMATED COUNT: 15.5 % (ref 11.9–14.6)
GLUCOSE BLD STRIP.AUTO-MCNC: 116 MG/DL (ref 65–100)
GLUCOSE BLD STRIP.AUTO-MCNC: 119 MG/DL (ref 65–100)
GLUCOSE BLD STRIP.AUTO-MCNC: 125 MG/DL (ref 65–100)
GLUCOSE BLD STRIP.AUTO-MCNC: 93 MG/DL (ref 65–100)
GLUCOSE SERPL-MCNC: 103 MG/DL (ref 65–100)
HCT VFR BLD AUTO: 34.2 % (ref 35.8–46.3)
HGB BLD-MCNC: 11 G/DL (ref 11.7–15.4)
IMM GRANULOCYTES # BLD AUTO: 0 K/UL (ref 0–0.5)
IMM GRANULOCYTES NFR BLD AUTO: 1 % (ref 0–5)
LYMPHOCYTES # BLD: 1.5 K/UL (ref 0.5–4.6)
LYMPHOCYTES NFR BLD: 19 % (ref 13–44)
MCH RBC QN AUTO: 26.2 PG (ref 26.1–32.9)
MCHC RBC AUTO-ENTMCNC: 32.2 G/DL (ref 31.4–35)
MCV RBC AUTO: 81.4 FL (ref 79.6–97.8)
MONOCYTES # BLD: 0.6 K/UL (ref 0.1–1.3)
MONOCYTES NFR BLD: 7 % (ref 4–12)
NEUTS SEG # BLD: 5.6 K/UL (ref 1.7–8.2)
NEUTS SEG NFR BLD: 72 % (ref 43–78)
NRBC # BLD: 0 K/UL (ref 0–0.2)
P-R INTERVAL, ECG05: 154 MS
PLATELET # BLD AUTO: 263 K/UL (ref 150–450)
PMV BLD AUTO: 10.6 FL (ref 9.4–12.3)
POTASSIUM SERPL-SCNC: 4 MMOL/L (ref 3.5–5.1)
Q-T INTERVAL, ECG07: 416 MS
QRS DURATION, ECG06: 80 MS
QTC CALCULATION (BEZET), ECG08: 455 MS
RBC # BLD AUTO: 4.2 M/UL (ref 4.05–5.2)
SODIUM SERPL-SCNC: 139 MMOL/L (ref 136–145)
TROPONIN-HIGH SENSITIVITY: 13.5 PG/ML (ref 0–14)
VENTRICULAR RATE, ECG03: 72 BPM
WBC # BLD AUTO: 7.8 K/UL (ref 4.3–11.1)

## 2020-12-22 PROCEDURE — 74011250636 HC RX REV CODE- 250/636: Performed by: FAMILY MEDICINE

## 2020-12-22 PROCEDURE — 77030038269 HC DRN EXT URIN PURWCK BARD -A

## 2020-12-22 PROCEDURE — 2709999900 HC NON-CHARGEABLE SUPPLY

## 2020-12-22 PROCEDURE — 71045 X-RAY EXAM CHEST 1 VIEW: CPT

## 2020-12-22 PROCEDURE — 36415 COLL VENOUS BLD VENIPUNCTURE: CPT

## 2020-12-22 PROCEDURE — 65270000029 HC RM PRIVATE

## 2020-12-22 PROCEDURE — 92526 ORAL FUNCTION THERAPY: CPT

## 2020-12-22 PROCEDURE — 97161 PT EVAL LOW COMPLEX 20 MIN: CPT

## 2020-12-22 PROCEDURE — 85025 COMPLETE CBC W/AUTO DIFF WBC: CPT

## 2020-12-22 PROCEDURE — 97110 THERAPEUTIC EXERCISES: CPT

## 2020-12-22 PROCEDURE — 80048 BASIC METABOLIC PNL TOTAL CA: CPT

## 2020-12-22 PROCEDURE — 74011250637 HC RX REV CODE- 250/637: Performed by: FAMILY MEDICINE

## 2020-12-22 PROCEDURE — 94761 N-INVAS EAR/PLS OXIMETRY MLT: CPT

## 2020-12-22 PROCEDURE — 93005 ELECTROCARDIOGRAM TRACING: CPT | Performed by: INTERNAL MEDICINE

## 2020-12-22 PROCEDURE — 74011250636 HC RX REV CODE- 250/636: Performed by: INTERNAL MEDICINE

## 2020-12-22 PROCEDURE — 84484 ASSAY OF TROPONIN QUANT: CPT

## 2020-12-22 PROCEDURE — 82962 GLUCOSE BLOOD TEST: CPT

## 2020-12-22 RX ORDER — MORPHINE SULFATE 2 MG/ML
2 INJECTION, SOLUTION INTRAMUSCULAR; INTRAVENOUS ONCE
Status: COMPLETED | OUTPATIENT
Start: 2020-12-22 | End: 2020-12-22

## 2020-12-22 RX ADMIN — BRIMONIDINE TARTRATE 1 DROP: 2 SOLUTION OPHTHALMIC at 08:42

## 2020-12-22 RX ADMIN — TIMOLOL MALEATE 1 DROP: 5 SOLUTION/ DROPS OPHTHALMIC at 08:42

## 2020-12-22 RX ADMIN — TIMOLOL MALEATE 1 DROP: 5 SOLUTION/ DROPS OPHTHALMIC at 22:19

## 2020-12-22 RX ADMIN — Medication 10 ML: at 22:00

## 2020-12-22 RX ADMIN — OLMESARTAN MEDOXOMIL 20 MG: 20 TABLET, FILM COATED ORAL at 08:41

## 2020-12-22 RX ADMIN — MORPHINE SULFATE 2 MG: 2 INJECTION, SOLUTION INTRAMUSCULAR; INTRAVENOUS at 07:53

## 2020-12-22 RX ADMIN — BRIMONIDINE TARTRATE 1 DROP: 2 SOLUTION OPHTHALMIC at 22:18

## 2020-12-22 RX ADMIN — Medication 5 ML: at 05:48

## 2020-12-22 RX ADMIN — TRAMADOL HYDROCHLORIDE 50 MG: 50 TABLET, FILM COATED ORAL at 07:27

## 2020-12-22 RX ADMIN — ENOXAPARIN SODIUM 40 MG: 40 INJECTION SUBCUTANEOUS at 08:41

## 2020-12-22 RX ADMIN — PANTOPRAZOLE SODIUM 40 MG: 40 TABLET, DELAYED RELEASE ORAL at 05:47

## 2020-12-22 RX ADMIN — Medication 10 ML: at 15:27

## 2020-12-22 RX ADMIN — AMLODIPINE BESYLATE 5 MG: 5 TABLET ORAL at 08:40

## 2020-12-22 RX ADMIN — MORPHINE SULFATE 2 MG: 2 INJECTION, SOLUTION INTRAMUSCULAR; INTRAVENOUS at 12:46

## 2020-12-22 NOTE — PROGRESS NOTES
Problem: Dysphagia (Adult)  Goal: *Acute Goals and Plan of Care (Insert Text)  Description: STG: Pt will demonstrate zero signs/sx of aspiration with pureed textures with thin liquids with 90% accuracy during all meals. STG: Pt will complete a Modified barium swallow study with zero signs/sx of aspiration  with 100% accuracy during swallow study. LTG: Pt will demonstrate zero signs/sx of aspiration with least restrictive diet with 100% accuracy during all meals. Outcome: Progressing Towards Goal   SPEECH LANGUAGE PATHOLOGY: DYSPHAGIA- Daily Note 1    NAME/AGE/GENDER: Suyapa Badillo is a 81 y.o. female  DATE: 12/22/2020  PRIMARY DIAGNOSIS: Acute respiratory failure with hypoxia ([facility] [J96.01]      ICD-10: Treatment Diagnosis: R13.12 Dysphagia, Oropharyngeal Phase    RECOMMENDATIONS   DIET:   PO:  Pureed  Liquids:  regular thin    MEDICATIONS: With liquid     ASPIRATION PRECAUTIONS  Slow rate of intake  Small bites/sips  Upright at 90 degrees during meal  Alternate bite/drink     COMPENSATORY STRATEGIES/MODIFICATIONS  Alternate liquids/solids     RECOMMENDATIONS for CONTINUED SPEECH THERAPY:   YES: Anticipate need for ongoing speech therapy during this hospitalization. ASSESSMENT   Patient presents with mild oropharyngeal phase dysphagia. Patient continues to feel most confident with pureed textures and declines trials of soft solids at this time. Patient tolerated puree and thin liquids via straw sip without overt s/sx. Recommend continue pureed textures with thin liquids with medications as tolerated. CONTINUATION OF SKILLED SERVICES/MEDICAL NECESSITY:  Patient continues to require skilled intervention due to dysphagia. EDUCATION:  Recommendations discussed with Patient  REHABILITATION POTENTIAL FOR STATED GOALS: Fair    PLAN    FREQUENCY/DURATION: Continue to follow patient 2 times a week for duration of hospital stay to address above goals.     - Recommendations for next treatment session: Next treatment will address diet tolerance/PO trials    SUBJECTIVE   Pleasant, alert. Reports tolerating puree textures well, but feels solids would still get stuck. Oxygen Device: nasal canula   Pain: Pain Scale 1: Numeric (0 - 10)  Pain Intensity 1: 0  Pain Location 1: Rib cage  Pain Intervention(s) 1: Medication (see MAR)    History of Present Injury/Illness: Ms. Tabitha Hoffman  has a past medical history of Gastrointestinal disorder and Hypertension. . She also  has a past surgical history that includes hx gyn and hx orthopaedic. PRECAUTIONS/ALLERGIES: Patient has no known allergies. Problem List:  (Impairments causing functional limitations):  dysphagia    Previous Dysphagia: YES feel like gets stuck  Diet Prior to Evaluation: regular/thin    Orientation:  Person  Place  Time  Situation    Cognitive-Linguistic Screening:  Speech Production:   clear  Expressive Language:  Appears WFL  Receptive Language:  Appears WFL  Cognition:   Appears WFL  Prior Level of Function: unknown  Recommendations: Given results of screening, patient appears to be functioning at baseline. No acute assessment of speech, language, or cognition warranted. OBJECTIVE   Oral Motor:   Labial: No impairment  Dentition: Limited  Oral Hygiene: Adequate  Lingual: No impairment    Swallow evaluation:   Patient consumed trials of thin liquid via cup and straw, pureed and mixed with no overt signs/sx of aspiration. Pt spit out soft solid. Pt requesting pureed textures with thin liquids. Pt is safe for mechanical soft textures if/when she is ready. Speech clear. Cognition appears WFL. Will follow for dysphagia management.     Tool Used: Dysphagia Outcome and Severity Scale (HARLAN)    Score Comments   Normal Diet  [] 7 With no strategies or extra time needed   Functional Swallow  [] 6 May have mild oral or pharyngeal delay   Mild Dysphagia  [] 5 Which may require one diet consistency restricted    Mild-Moderate Dysphagia  [x] 4 With 1-2 diet consistencies restricted   Moderate Dysphagia  [] 3 With 2 or more diet consistencies restricted   Moderate-Severe Dysphagia  [] 2 With partial PO strategies (trials with ST only)   Severe Dysphagia  [] 1 With inability to tolerate any PO safely      Score:  Initial: 4 Most Recent:4 (Date 12/22/20 )   Interpretation of Tool: The Dysphagia Outcome and Severity Scale (HARLAN) is a simple, easy-to-use, 7-point scale developed to systematically rate the functional severity of dysphagia based on objective assessment and make recommendations for diet level, independence level, and type of nutrition. Current Medications:   No current facility-administered medications on file prior to encounter. Current Outpatient Medications on File Prior to Encounter   Medication Sig Dispense Refill    brimonidine-timoloL (Combigan) 0.2-0.5 % drop ophthalmic solution Administer 1 Drop to left eye two (2) times a day.  polyvinyl alcohol-povidon,PF, (Refresh Classic, PF,) 1.4-0.6 % ophthalmic solution Administer 1 Drop to both eyes as needed.  senna (Senna) 8.6 mg tablet Take 2 Tabs by mouth nightly.  acetaminophen (TylenoL) 325 mg tablet Take 650 mg by mouth every four (4) hours as needed for Pain.  ondansetron hcl (Zofran) 4 mg tablet Take 4 mg by mouth every six (6) hours as needed for Nausea or Nausea or Vomiting.  olmesartan (BENICAR) 20 mg tablet Take 20 mg by mouth daily.  amLODIPine (NORVASC) 5 mg tablet Take 5 mg by mouth daily.  omeprazole (PRILOSEC) 20 mg capsule Take 20 mg by mouth two (2) times a day. Indications: gastroesophageal reflux disease      traMADoL (ULTRAM) 50 mg tablet Take 50 mg by mouth every eight (8) hours as needed for Pain. Indications: pain      lidocaine (LIDODERM) 5 % Apply patch to the affected area for 12 hours a day and remove for 12 hours a day.  20 Each 0    clidinium-chlordiazepoxide (LIBRAX) 5-2.5 mg per capsule Take 1 Cap by mouth 4 times daily (before meals and nightly). After treatment position/precautions:  Upright in bed    Total Treatment Duration:   Time In: 7191  Time Out: [address].  MS Esme, CCC-SLP

## 2020-12-22 NOTE — PROGRESS NOTES
Called to room to assess pt after pt c/o chest pain. Pt c/o sharp pain in right side/rib cage. Rating pain 4/10 while moaning in pain. VSS. PRN tramadol given @ 0727. Pt reports taking a deep breath and pain radiating to right shoulder. Dr. King Apo notified and orders received. Will monitor.

## 2020-12-22 NOTE — PROGRESS NOTES
Problem: Falls - Risk of  Goal: *Absence of Falls  Description: Document Jose Eduardo Fall Risk and appropriate interventions in the flowsheet.   Outcome: Progressing Towards Goal  Note: Fall Risk Interventions:  Mobility Interventions: Patient to call before getting OOB, Communicate number of staff needed for ambulation/transfer    Mentation Interventions: Reorient patient, More frequent rounding, Toileting rounds, Room close to nurse's station    Medication Interventions: Patient to call before getting OOB, Teach patient to arise slowly    Elimination Interventions: Call light in reach, Patient to call for help with toileting needs, Toileting schedule/hourly rounds    History of Falls Interventions: Bed/chair exit alarm

## 2020-12-22 NOTE — PROGRESS NOTES
H&P/Consult Note/Progress Note/Office Note:   Matias Lemus  MRN: [id number]  :1935  Age:85 y.o.    HPI: Matias Lemus is a 81 y.o. female who is s/p lap cholecystectomy for acute calculous cholecystitis on 20. She also has a large mixed hiatal and paraesophageal hernia dating back at least to 2016   from studies at [facility] where she was followed and no surgery recommended. She came to the ER on 20 complaining of a 2-day history of pain that she states was primarily involving both of her flanks radiating to her epigastric region as well as the upper chest.    She specifically says she does not think the pain started in her epigastric region or her chest.    The pain was constant and progressive. Nothing in particular made it better or worse. She had associated nausea or vomiting. She was seen in an urgent treatment center and sent to the ER for further evaluation. While in the ER she is found to be significantly hypertensive with a blood pressure 175/99. Her troponin was markedly elevated at 56.7. WBC 17.3k;  LFTs and lipase were normal.  Lactic acid was also normal at 1.0. Urinalysis  Results for Zbigniew Amador (MRN [id number]) as of 2020 19:29   Ref. Range 2020 17:33   Epithelial cells Latest Ref Range: 0 /hpf 0   Mucus Latest Ref Range: 0 /lpf 0   WBC Latest Ref Range: 0 /hpf 0-3   RBC Latest Ref Range: 0 /hpf 0   Bacteria Latest Ref Range: 0 /hpf 0   Crystals, urine Latest Ref Range: 0 /LPF 0   Casts Latest Ref Range: 0 /lpf 0   Other observations Latest Units:   RESULTS VERIFIED MANUALLY           She has a h/o GERD and prior hysterectomy.   Her large hiatal hernia has been known since at least  and has been imaged and evaluated multiple times at [facility]now [facility]   She was evaluated at [facility] with esophagogram and CT imaging and she reports that the surgeon told her that she was not a surgical candidate for the hiatal hernia  and that her operative risks related to hiatal hernia repair were too high to proceed. She is unsure of her remote history but reports she thinks she had ovarian cancer in the 1950s   and had a hysterectomy followed by chemotherapy and radiation treatment. 10/14/16 CT chest (at [facility])  Impression:  Stable small pulmonary nodule in the right middle lobe.  Indeterminate.  Consider CT chest surveillance 6 months. No thoracic adenopathy. Moderate hiatal hernia. Gallstones. 4/23/18 CT chest (at [facility])  Impression:  Stable 5 mm nodule in the right middle lobe for 2 years. Benign in nature. No thoracic adenopathy. Large hiatal hernia. Multiple gallstones        11/27/18 esophagram (at [facility])  Large hiatal hernia. This is a mixed type hiatal hernia with the GE junction above the diaphragm, however, there is also a paraesophageal component involving the gastric fundus and body. Free GE reflux is demonstrated into the distal  2/3rds of the esophagus, however, there is no associated esophageal ulceration, erosions or mass lesion. There is no esophageal stenosis. A 13 mm diameter barium tablet passed rapidly to the esophagus to the stomach without obstruction. .    IMPRESSION:  1. Large mixed sliding and paraesophageal type hiatal hernia with a portion of the gastric fundus and gastric body in the lower chest.  2.  Extensive gastroesophageal reflux without evidence of associated esophageal ulceration or mass. .         11/27/20 portable CXR  IMPRESSION: Large hiatal hernia, grossly stable compared to prior chest x-rays.       11/27/20 EKG  NSR; Prolonged QT   Abnormal ECG   when compared with ECG of 08-DEC-2017 09:14, QT has lengthened       11/27/20 CT abd/pelvis with IV contrast  Abdomen: The lung bases show a left-sided hiatal hernia with some adjacent  compressive atelectasis.  Air-filled loop of bowel seen between the liver and the  right diaphragm, with diaphragm slips seen along the posterior lateral right  liver, the gallbladder shows multiple gallstones and is somewhat distended with  some adjacent pericholecystic fluid, the pancreas, spleen, adrenals appear  normal. Right kidney appears normal, left kidney midpole shows a 8mm 43  Hounsfield unit minimally complex cystic lesion with the midpole laterally  showing an exophytic 7 mm 15 Hounsfield unit probable simple cyst. There is no  intraperitoneal free air, nor abnormal adenopathy seen. Patient has a high  riding cecum, the appendix is not seen.     Pelvis: There are multiple diverticula within an elongated sigmoid colon, the  bladder and pelvic sidewalls appear normal, the uterus is absent. There is no  intraperitoneal free air, ascites, nor abnormal adenopathy seen.     IMPRESSION:  1. 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 2. Gallstones and some pericholecystic fluid, if cholecystitis is suspected dedicated ultrasound should be considered. 3. There does appear to be a tiny common duct stone distally all below the duct does not appear dilated with a stone measuring 3 mm. 4. Simple cyst left kidney with probable complex cyst left kidney if imaging confirmation is desired nonemergent US should be considered. 5. Multiple diverticula without evidence of active diverticulitis.       11/28/20 echo  EF estimated 60-65%. No regional wall motion abnormalities. Wall thickness was normal. Left ventricular diastolic function parameters were normal. Avg E/e': 9.28.     RIGHT VENTRICLE: The size was normal. Systolic function was normal.  Estimated peak pressure was in the range of 25-30 mmHg. LEFT ATRIUM: Size was normal.  RIGHT ATRIUM: Size was normal.    SUMMARY:  -  Left ventricle: Systolic function was normal. EF 60-65%. No regional wall motion abnormalities. -  Inferior vena cava, hepatic veins: The respirophasic change in diameter was more than 50%. -  Tricuspid valve: There was mild regurgitation.           11/28/20 US abd  FINDINGS:   LIVER: 13.7 cm. Normal echogenicity. No masses. BILE DUCTS: No intrahepatic bile duct dilatation. CBD diameter = 6 mm. GALLBLADDER: Mild gallbladder distention and multiple gallstones. No significant wall thickening. PANCREAS: Normal.  SPLEEN: Normal.     RIGHT KIDNEY: 10.1 cm. No mass or hydronephrosis. LEFT KIDNEY: 10.8 cm. 2 small renal cysts. No hydronephrosis. ABDOMINAL AORTA AND IVC: Normal in size. ASCITES: No free fluid.     IMPRESSION:   1. Distended gallbladder with multiple gallstones. ,  Concerning for early acute cholecystitis based on CT appearance. 2.  Small simple cysts in the left kidney. 11/30/20 Gastrograffin UGI  Patient swallowed barium without difficulty. Esophagus appeared normal.  Redemonstrated is a hiatal hernia. The GE junction is in normal anatomic  location with the fundus, body, antrum in a thoracic location. Enteric contrast  quickly fills the entire nondistended stomach and empties into the duodenum. After the stomach becomes progressively distended with contrast, there is  limited emptying into the duodenum. This results in moderate esophageal reflux.       Impression:   Type IV paraesophageal hernia. There is rapid transit of contrast through the intrathoracic stomach into the intra-abdominal duodenum and small bowel on initial imaging. When the stomach becomes progressively more distended with contrast, limited flow of contrast into the duodenum and small bowel is seen.          11/30/20 HIDA  Prompt and homogeneous uptake of tracer by the liver. Activity is rapidly excreted into the biliary system with no activity seen in the gallbladder by one hour. Patient was injected with 2 mg of morphine and imaged for an additional half hour without activity seen in the gallbladder.     By the end of imaging the CBD and small bowel activity are seen.       IMPRESSION:    Obstructed cystic duct which can be seen with cholecystitis. 12/21/20 HIDA (post-op)  Prompt and homogeneous uptake of tracer by the liver. Activity is rapidly excreted into the biliary system. Common bile duct and small bowel activity is demonstrated. No abnormal collections in the gallbladder fossa or right paracolic gutter.     Small focus of activity accumulates in the left upper quadrant. This probably represents stomach activity by gastric secretion in a hiatal  hernia when compared to the coronal CT scan.     IMPRESSION: Negative for biliary leak. Normal activity in the liver, common bile duct and small bowel. Additonal hx:  11/28/20 echo pending; IV Abx for possible cholecystitis;  WBC improving; lactic acid normal; LFTs normal; HIDA Monday to look for GB filling  11/29/20 abd pain persists; comes and goes;no SOB; no CP;  lactic acid normal; wbc coming down; UGI today; HIDA in am; cardiology thinks troponin elevation related to HTN/demand ischemia  11/30/20 Intermittent epigastric pain; WBC 9.3k; lactic acid normal   12/1/20  RUQ pain; HIDA show non-vis of GB suggestive of cholecystitis; UGI as above with no obstruction but slow emptying; OR for lap cholecystectomy today  12/2/20 POD1 LC;  complaining of inability to void and string sensation she needs to go; LFTs normal; Foster drain serosang; Wheeler today  12/3/20 POD2 LC; feeling better; AF WBC/LFTs normal start sips of clears  12/4/20 POD3 LC 1/10 incisional pain; LFTs normal clear liquid diet; await SNF placement  12/5/20 POD4 LC 1/10 incisional pain; LFTs normal clear liquid diet; await SNF placement  12/6/20 POD5 LC 1/10 incisional pain; LFTs normal Full liquid diet; await SNF placement. K+3.4  12/21/20 She was scheduled for post op visit today in the office so we were asked by the hospitalist to see her since she was admitted. She is currently admitted secondary to hypoxia. She was treated conservatively and suppose to discharge back to her LTC facility today.  On exam, she denied n/v. No difficulty eating. She reported some pain with inspiration. Her RUQ drain was possibly dilute/bilious.    12/22/20 POD21 no abd pain; WBC normal; HIDA showed no leak; drain (serous) out today;  Surgery will sign off                Past Medical History:   Diagnosis Date    Gastrointestinal disorder     acid reflux    Hypertension      Past Surgical History:   Procedure Laterality Date    HX GYN      cervical CA, hysterectomy    HX ORTHOPAEDIC      knee     Current Facility-Administered Medications   Medication Dose Route Frequency    influenza vaccine 2020-21 (6 mos+)(PF) (FLUARIX/FLULAVAL/FLUZONE QUAD) injection 0.5 mL  0.5 mL IntraMUSCular PRIOR TO DISCHARGE    clindinium-chlordiazePOXIDE (LIBRAX) capsule 1 Cap (Patient Supplied)  1 Cap Oral AC&HS    pantoprazole (PROTONIX) tablet 40 mg  40 mg Oral ACB    traMADoL (ULTRAM) tablet 50 mg  50 mg Oral Q8H PRN    olmesartan (BENICAR) tablet 20 mg  20 mg Oral DAILY    amLODIPine (NORVASC) tablet 5 mg  5 mg Oral DAILY    sodium chloride (NS) flush 5-40 mL  5-40 mL IntraVENous Q8H    sodium chloride (NS) flush 5-40 mL  5-40 mL IntraVENous PRN    acetaminophen (TYLENOL) tablet 650 mg  650 mg Oral Q6H PRN    Or    acetaminophen (TYLENOL) suppository 650 mg  650 mg Rectal Q6H PRN    polyethylene glycol (MIRALAX) packet 17 g  17 g Oral DAILY PRN    promethazine (PHENERGAN) tablet 12.5 mg  12.5 mg Oral Q6H PRN    Or    ondansetron (ZOFRAN) injection 4 mg  4 mg IntraVENous Q6H PRN    enoxaparin (LOVENOX) injection 40 mg  40 mg SubCUTAneous DAILY    guaiFENesin-dextromethorphan (ROBITUSSIN DM) 100-10 mg/5 mL syrup 10 mL  10 mL Oral Q4H PRN    HYDROcodone-homatropine (HYCODAN) 5-1.5 mg/5 mL (5 mL) oral solution 5 mL  5 mL Oral Q4H PRN    loperamide (IMODIUM) capsule 2 mg  2 mg Oral Q4H PRN    lidocaine (XYLOCAINE) 4 % (40 mg/mL) topical solution   Aerosolization Q4H PRN    metoprolol tartrate (LOPRESSOR) tablet 50 mg  50 mg Oral Q12H PRN  hydrALAZINE (APRESOLINE) tablet 50 mg  50 mg Oral QID PRN    alum-mag hydroxide-simeth (MYLANTA) oral suspension 30 mL  30 mL Oral Q4H PRN    insulin lispro (HUMALOG) injection   SubCUTAneous AC&HS    brimonidine (ALPHAGAN) 0.2 % ophthalmic solution 1 Drop  1 Drop Left Eye Q12H    And    timolol (TIMOPTIC) 0.5 % ophthalmic solution 1 Drop  1 Drop Left Eye Q12H    carboxymethylcellulose sodium (REFRESH LIQUIGEL) 1 % ophthalmic solution 1 Drop  1 Drop Both Eyes QID PRN     Patient has no known allergies. Social History     Socioeconomic History    Marital status:      Spouse name: Not on file    Number of children: Not on file    Years of education: Not on file    Highest education level: Not on file   Tobacco Use    Smoking status: Never Smoker    Smokeless tobacco: Never Used   Substance and Sexual Activity    Alcohol use: No    Drug use: No    Sexual activity: Never     Social History     Tobacco Use   Smoking Status Never Smoker   Smokeless Tobacco Never Used     No family history on file. ROS: The patient has no difficulty with chest pain or shortness of breath. No fever or chills. Comprehensive review of systems was otherwise unremarkable except as noted above. Physical Exam:   Visit Vitals  /69   Pulse 74   Temp 97.7 °F (36.5 °C)   Resp 18   Ht 5' 3\" (1.6 m)   Wt 181 lb 7 oz (82.3 kg)   SpO2 97%   BMI 32.14 kg/m²     Vitals:    12/22/20 0443 12/22/20 0713 12/22/20 1129 12/22/20 1144   BP: 119/69 120/81 112/69    Pulse: 65 71 74    Resp: 16 17 18    Temp: 97.9 °F (36.6 °C) 97.7 °F (36.5 °C) 97.7 °F (36.5 °C)    SpO2: 95% 93% 95% 97%   Weight:       Height:         12/22 0701 - 12/22 1900  In: -   Out: 20 [Drains:20]  12/20 1901 - 12/22 0700  In: -   Out: 700 [Urine:700]    Constitutional: Alert, oriented, cooperative patient in no acute distress; appears stated age    Eyes: Sclera are clear.  EOMs intact  ENMT: no external lesions gross hearing normal; no obvious neck masses, no ear or lip lesions, nares normal  CV: RRR. Normal perfusion  Resp: No JVD. Breathing is  non-labored; no audible wheezing. GI: Removed staples;abd soft and non-distended; non tender; no epigastric pain;  Foster drain (serous)  removed         Musculoskeletal: unremarkable with normal function. No embolic signs or cyanosis. Neuro:  Oriented; moves all 4; no focal deficits  Psychiatric: normal affect and mood, no memory impairment    Recent vitals (if inpt):  Patient Vitals for the past 24 hrs:   BP Temp Pulse Resp SpO2   12/22/20 1144     97 %   12/22/20 1129 112/69 97.7 °F (36.5 °C) 74 18 95 %   12/22/20 0713 120/81 97.7 °F (36.5 °C) 71 17 93 %   12/22/20 0443 119/69 97.9 °F (36.6 °C) 65 16 95 %   12/22/20 0054 (!) 98/58       12/22/20 0013 96/60 98.4 °F (36.9 °C) 69 16 90 %   12/21/20 1932 128/74 98.1 °F (36.7 °C) 73 16 97 %       Labs:  Recent Labs     12/22/20  0917   WBC 7.8   HGB 11.0*         K 4.0      CO2 29   BUN 17   CREA 0.67   *       Lab Results   Component Value Date/Time    WBC 7.8 12/22/2020 09:17 AM    HGB 11.0 (L) 12/22/2020 09:17 AM    PLATELET 431 77/35/0810 09:17 AM    Sodium 139 12/22/2020 09:17 AM    Potassium 4.0 12/22/2020 09:17 AM    Chloride 104 12/22/2020 09:17 AM    CO2 29 12/22/2020 09:17 AM    BUN 17 12/22/2020 09:17 AM    Creatinine 0.67 12/22/2020 09:17 AM    Glucose 103 (H) 12/22/2020 09:17 AM    INR 1.1 12/18/2020 01:55 PM    aPTT 35.3 (H) 12/18/2020 01:55 PM    Bilirubin, total 0.5 12/18/2020 02:08 AM    ALT (SGPT) 22 12/18/2020 02:08 AM    Alk. phosphatase 94 12/18/2020 02:08 AM    Lipase 72 (L) 11/27/2020 02:01 PM       CT Results  (Last 48 hours)    None        chest X-ray      I reviewed recent labs, recent radiologic studies, and pertinent records including other doctor notes if needed. I independently reviewed radiology images for studies I described above or studies I have ordered.    Admission date (for inpatients): 12/18/2020 * No surgery found *  * No surgery found *    ASSESSMENT/PLAN:  Problem List  Date Reviewed: 12/1/2020          Codes Class Noted    * (Principal) Acute respiratory failure with hypoxia ([facility] [address] ICD-10-CM: J96.01  ICD-9-CM: 518.81  12/18/2020        Large hiatal hernia (mixed sliding and paraesophageal dating back to 2016) ICD-10-CM: K44.9  ICD-9-CM: 553.3  11/27/2020        Non-intractable vomiting with nausea ICD-10-CM: R11.2  ICD-9-CM: 787.01  11/27/2020        Epigastric pain ICD-10-CM: R10.13  ICD-9-CM: 789.06  11/27/2020        Elevated troponin ICD-10-CM: R77.8  ICD-9-CM: 790.6  11/27/2020        Uncontrolled hypertension ICD-10-CM: I10  ICD-9-CM: 401.9  11/27/2020        Acute calculous cholecystitis ICD-10-CM: K80.00  ICD-9-CM: 574.00  11/27/2020    Overview Addendum 12/7/2020  7:36 AM by Faisal Purdy MD     12/1/20 s/p lap cholecystectomy; Dr Esqueda Comings: ACUTE AND [address]; CHOLELITHIASIS. Electronically signed out on 12/3/2020 12:19 by Greg Guallpa. Geovanna Slater MD              Abnormal EKG ICD-10-CM: R94.31  ICD-9-CM: 794.31  11/27/2020        Chest pain ICD-10-CM: R07.9  ICD-9-CM: 786.50  11/27/2020        Hypokalemia ICD-10-CM: E87.6  ICD-9-CM: 276.8  11/27/2020        Type 2 diabetes mellitus without complication, without long-term current use of insulin (HCC) ICD-10-CM: E11.9  ICD-9-CM: 250.00  11/11/2020        Essential (primary) hypertension ICD-10-CM: I10  ICD-9-CM: 401.9  1/24/2014    Overview Signed 12/18/2020  4:08 AM by Maria Del Carmen Ceballos MD     Last Assessment & Plan:   Recent labs reviewed. The patient blood pressure is stable. The patient will continue current dose of amlodipine-benazepril.                  Principal Problem:    Acute respiratory failure with hypoxia ([facility] [address] (12/18/2020)    Active Problems:    Hypokalemia (11/27/2020)      Type 2 diabetes mellitus without complication, without long[facility]term [facility] use [facility] (11/11/2020) Essential (primary) hypertension (1/24/2014)      Overview: Last Assessment & Plan:       Recent labs reviewed. The patient blood pressure is stable. The patient       will continue current dose of amlodipine-benazepril. Acute calculous cholecystitis suggested by signs, symptoms, and HIDA scan results  She is s/p lap cholecystectomy for severe acute calculous cholecystitis on 12/1/20. IOC not possible as a result of friable ischemic cystic duct    She reports no problems with eating, bowel movements, voiding, or their wound, no ongoing postoperative problems. WBC and LFTs normal post-op  HIDA negative for leak on 12/21/20  Drain out on 12/22/20    WIll sign off  See us prn from here          I have personally performed a face-to-face diagnostic evaluation and management  service on this patient. I have independently seen the patient. I have independently obtained the above history from the patient/family. I have independently examined the patient with above findings. I have independently reviewed data/labs for this patient and developed the above plan of care (MDM). Signed: [place] [facility].  Bev Vitale MD, FACS

## 2020-12-22 NOTE — PROGRESS NOTES
END OF SHIFT NOTE:    Hourly rounds were conducted. INTAKE/OUTPUT  No intake/output data recorded. Voiding: YES  Catheter: NO  Drain:   Jacobo-Coulter Drain 12/01/20 Right; Inferior Abdomen (Active)   Site Assessment Clean, dry, & intact 12/21/20 1920   Dressing Status Clean, dry, & intact 12/21/20 1920   Status Patent;Clamped; Charged 12/21/20 1920   Drainage Color Serous 12/21/20 1920   Output (ml) 0 ml 12/21/20 0452       External Female Catheter 12/19/20 (Active)   Site Assessment Clean, dry, & intact 12/21/20 1920   Repositioned No 12/21/20 1920   Perineal Care No 12/21/20 1920   Wick Changed No 12/21/20 1920   Suction Canister/Tubing Changed No 12/21/20 1920   Urine Output (mL) 100 ml 12/20/20 1803               Flatus: Patient does have flatus present. Stool:  0 occurrences. Characteristics:  Stool Assessment  Stool Color: Brown  Stool Appearance: Soft  Stool Amount: Large  Stool Source/Status: Rectum    Emesis: 0 occurrences. Characteristics:        VITAL SIGNS  Patient Vitals for the past 12 hrs:   Temp Pulse Resp BP SpO2   12/22/20 0054    (!) 98/58    12/22/20 0013 98.4 °F (36.9 °C) 69 16 96/60 90 %   12/21/20 1932 98.1 °F (36.7 °C) 73 16 128/74 97 %       Pain Assessment  Pain Intensity 1: 0 (12/22/20 0054)  Pain Location 1: Abdomen  Pain Intervention(s) 1: Medication (see MAR)  Patient Stated Pain Goal: 0    Ambulating  Yes    Shift report given to oncoming nurse at the bedside.     Ebony Chavez

## 2020-12-22 NOTE — PROGRESS NOTES
ACUTE PHYSICAL THERAPY GOALS:  (Developed with and agreed upon by patient and/or caregiver. )  LTG:  (1.)Ms. Shekhar White will move from supine to sit and sit to supine, scoot up and down and roll side to side in bed INDEPENDENT within 7 treatment day(s). (2.)Ms. Shekhar White will transfer from bed to chair and chair to bed with CONTACT GUARD ASSIST using the least restrictive device within 7 treatment day(s). (3.)Ms. Shekhar White will ambulate with CONTACT GUARD ASSIST for 75+ feet with the least restrictive device within 7 treatment day(s). ________________________________________________________________________________________________    PHYSICAL THERAPY ASSESSMENT: Initial Assessment and PM PT Treatment Day # 1      Juan Mckeon is a 81 y.o. female   PRIMARY DIAGNOSIS: Acute respiratory failure with hypoxia (HCC)  Acute respiratory failure with hypoxia (HCC) [J96.01]       Reason for Referral:  Respiratory failure  ICD-10: Treatment Diagnosis: Generalized Muscle Weakness (M62.81)  Difficulty in walking, Not elsewhere classified (R26.2)  Other abnormalities of gait and mobility (R26.89)  INPATIENT: Payor: [employer] MEDICARE COMPLETE / Plan: [facility] MEDICARE COMPLETE / Product Type: Managed Care Medicare /     ASSESSMENT:     REHAB RECOMMENDATIONS:   Recommendation to date pending progress:  Setting:   Short-term Rehab  Equipment:    To Be Determined     PRIOR LEVEL OF FUNCTION:  (Prior to Hospitalization) INITIAL/CURRENT LEVEL OF FUNCTION:  (Most Recently Demonstrated)   Bed Mobility:   Supervision  Sit to Stand:   Supervision  Transfers:   Supervision  Gait/Mobility:   Supervision Bed Mobility:   Minimal Assistance  Sit to Stand:   Moderate Assistance  Transfers:   Moderate Assistance  Gait/Mobility:   Moderate Assistance     ASSESSMENT:  Ms. Shekhar White presents with generalized weakness, fatigue, and pain/soreness s/p prior surgery. She was admitted from [facility] where she was working with therapy.  Performs bed mobility/transfer to sitting with additional time, cueing, and min-mod assist. Fair seated balance. Min-mod assist for sit-stand transfer and ambulation 3 ft from bed to chair. Heavy cueing for posture, body mechanics, and gait/transfer safety. Took seated rest break then performs LE exercises with good participation. Functioning below baseline at this time and will need to return to rehab at [place]. SUBJECTIVE:   Ms. Jorge Espana states, \"I feel good actually. \"    SOCIAL HISTORY/LIVING ENVIRONMENT: Lives alone at baseline. Admitted from [facility].       OBJECTIVE:     PAIN: VITAL SIGNS: LINES/DRAINS:   Pre Treatment: Pain Screen  Pain Scale 1: Numeric (0 - 10)  Pain Intensity 1: 0  Post Treatment: 0/10 Vital Signs  O2 Device: Nasal cannula  O2 Flow Rate (L/min): 2 l/min Purewick  O2 Device: Nasal cannula     GROSS EVALUATION:   Within Functional Limits Abnormal/ Functional Abnormal/ Non-Functional (see comments) Not Tested Comments:   AROM [] [x] [] [] Dec full AROM/PROM right knee   PROM [] [x] [] []    Strength [] [x] [] []    Balance [] [x] [] []    Posture [] [x] [] []    Sensation [] [] [] []    Coordination [] [] [] []    Tone [] [] [] []    Edema [] [] [] []    Activity Tolerance [] [x] [] []     [] [] [] []      COGNITION/  PERCEPTION: Intact Impaired   (see comments) Comments:   Orientation [x] []    Vision [x] []    Hearing [x] []    Command Following [x] []    Safety Awareness [] []     [] []      MOBILITY: I Mod I S SBA CGA Min Mod Max Total  NT x2 Comments:   Bed Mobility    Rolling [] [] [] [] [] [x] [] [] [] [] []    Supine to Sit [] [] [] [] [] [x] [x] [] [] [] []    Scooting [] [] [] [] [] [x] [x] [] [] [] []    Sit to Supine [] [] [] [] [] [] [] [] [] [] []    Transfers    Sit to Stand [] [] [] [] [] [] [x] [] [] [] []    Bed to Chair [] [] [] [] [] [] [x] [] [] [] []    Stand to Sit [] [] [] [] [] [x] [] [] [] [] []    I=Independent, Mod I=Modified Independent, S=Supervision, SBA=Standby Assistance, CGA=Contact Guard Assistance,   Min=Minimal Assistance, Mod=Moderate Assistance, Max=Maximal Assistance, Total=Total Assistance, NT=Not Tested  GAIT: I Mod I S SBA CGA Min Mod Max Total  NT x2 Comments:   Level of Assistance [] [] [] [] [] [x] [x] [] [] [] []    Distance 3    DME handheld assist    Gait Quality Forward flexed posture, weak, shuffling    I=Independent, Mod I=Modified Independent, S=Supervision, SBA=Standby Assistance, CGA=Contact Guard Assistance,   Min=Minimal Assistance, Mod=Moderate Assistance, Max=Maximal Assistance, Total=Total Assistance, NT=Not [facility]       How much difficulty does the patient currently have. .. Unable A Lot A Little None   1. Turning over in bed (including adjusting bedclothes, sheets and blankets)? [] 1   [] 2   [x] 3   [] 4   2. Sitting down on and standing up from a chair with arms ( e.g., wheelchair, bedside commode, etc.)   [] 1   [x] 2   [] 3   [] 4   3. Moving from lying on back to sitting on the side of the bed? [] 1   [x] 2   [] 3   [] 4   How much help from another person does the patient currently need. .. Total A Lot A Little None   4. Moving to and from a bed to a chair (including a wheelchair)? [] 1   [x] 2   [] 3   [] 4   5. Need to walk in hospital room? [] 1   [x] 2   [] 3   [] 4   6. Climbing 3-5 steps with a railing? [x] 1   [] 2   [] 3   [] 4   © 2007, Trustees of [address] [place] [address] [address] [address], under license to [employer]. All rights reserved     Score:  Initial: 12 Most Recent: X (Date: -- )    Interpretation of Tool:  Represents activities that are increasingly more difficult (i.e. Bed mobility, Transfers, Gait).     PLAN:   FREQUENCY/DURATION: PT Plan of Care: 3 times/week for duration of hospital stay or until stated goals are met, whichever comes first.    PROBLEM LIST:   (Skilled intervention is medically necessary to address:)  1. Decreased Activity Tolerance  2. Decreased AROM/PROM  3. Decreased Balance  4. Decreased Coordination  5. Decreased Gait Ability  6. Decreased Strength  7. Decreased Transfer Abilities  8. Increased Pain   INTERVENTIONS PLANNED:   (Benefits and precautions of physical therapy have been discussed with the patient.)  1. Therapeutic Activity  2. Therapeutic Exercise/HEP  3. Neuromuscular Re-education  4. Gait Training  5. Manual Therapy  6. Education     TREATMENT:     EVALUATION: Low Complexity : (Untimed Charge)    TREATMENT:   ($$ Therapeutic Exercises: 8-22 mins    )  Therapeutic Exercise (15 Minutes): Therapeutic exercises noted below to improve functional activity tolerance, AROM, strength, mobility and balance.        Date:  12/22/20 Date:   Date:     Activity/Exercise Parameters Parameters Parameters   LAQ 15x AB     Seated marching 15x AB     Seated hip aBd 15x AB     Ankle pumps 15x AB     ambulation 3 ft min A                       AFTER TREATMENT POSITION/PRECAUTIONS:  Chair, Needs within reach and RN notified    INTERDISCIPLINARY COLLABORATION:  RN/PCT and PT/PTA    TOTAL TREATMENT DURATION:  PT Patient Time In/Time Out  Time In: 1445  Time Out: 1045 [facility]

## 2020-12-22 NOTE — PROGRESS NOTES
Progress Note    Patient: Bryan Pelletier MRN: [id number]  SSN: [id number]    YOB: 1935  Age: 81 y.o. Sex: female      Admit Date: 12/18/2020    LOS: 4 days     Subjective:   81year old AAF with PMH significant for HTN and recent allison admitted on 12/18/20 from [facility] with acute respiratory failure with SOB and cough. Patient seen and examined at bedside. Today presenting with RUQ pain radiated to her shoulder. Denies chest pain, no nausea or vomiting.      Objective:     Vitals:    12/22/20 0713 12/22/20 1129 12/22/20 1144 12/22/20 1542   BP: 120/81 112/69  121/75   Pulse: 71 74  75   Resp: 17 18 18   Temp: 97.7 °F (36.5 °C) 97.7 °F (36.5 °C)  98.2 °F (36.8 °C)   SpO2: 93% 95% 97% 95%   Weight:       Height:            Intake and Output:  Current Shift: 12/22 0701 - 12/22 1900  In: -   Out: 120 [Urine:100; Drains:20]  Last three shifts: 12/20 1901 - 12/22 0700  In: -   Out: 700 [Urine:700]    ROS  10 ROS negative except from stated on subjective    Physical Exam:   General: Alert, oriented, NAD  HEENT: NC/AT, EOM are intact  Neck: supple, no JVD  Cardiovascular: RRR, S1, S2, no murmurs  Respiratory: Lungs are clear, no wheezes or rales  Abdomen: Soft, RUQ tenderness, ND  Back: No CVA tenderness, no paraspinal tenderness  Extremities: LE without pedal edema, no erythema  Neuro: A&O, CN are intact, no focal deficits  Skin: no rash or ulcers  Psych: good mood and affect    Lab/Data Review:  I have personally reviewed patients laboratory data showing  Recent Results (from the past 24 hour(s))   GLUCOSE, POC    Collection Time: 12/21/20  9:05 PM   Result Value Ref Range    Glucose (POC) 103 (H) 65 - 100 mg/dL   GLUCOSE, POC    Collection Time: 12/22/20  7:15 AM   Result Value Ref Range    Glucose (POC) 93 65 - 100 mg/dL   EKG, 12 LEAD, SUBSEQUENT    Collection Time: 12/22/20  8:10 AM   Result Value Ref Range    Ventricular Rate 72 BPM    Atrial Rate 72 BPM    P-R Interval 154 ms    QRS Duration 80 ms    Q-T Interval 416 ms    QTC Calculation (Bezet) 455 ms    Calculated P Axis 57 degrees    Calculated R Axis -44 degrees    Calculated T Axis 49 degrees    Diagnosis       Normal sinus rhythm  Left axis deviation  Nonspecific ST abnormality  Abnormal ECG  When compared with ECG of 27-NOV-2020 13:55,  QRS axis Shifted left  ST no longer depressed in Anterior leads  T wave amplitude has increased in Lateral leads  Confirmed by URSULA WILHELM (), RAQUEL REINOSO ([id number]) on 12/22/2020 8:42:54 AM     CBC WITH AUTOMATED DIFF    Collection Time: 12/22/20  9:17 AM   Result Value Ref Range    WBC 7.8 4.3 - 11.1 K/uL    RBC 4.20 4.05 - 5.2 M/uL    HGB 11.0 (L) 11.7 - 15.4 g/dL    HCT 34.2 (L) 35.8 - 46.3 %    MCV 81.4 79.6 - 97.8 FL    MCH 26.2 26.1 - 32.9 PG    MCHC 32.2 31.4 - 35.0 g/dL    RDW 15.5 (H) 11.9 - 14.6 %    PLATELET 195 489 - 148 K/uL    MPV 10.6 9.4 - 12.3 FL    ABSOLUTE NRBC 0.00 0.0 - 0.2 K/uL    DF AUTOMATED      NEUTROPHILS 72 43 - 78 %    LYMPHOCYTES 19 13 - 44 %    MONOCYTES 7 4.0 - 12.0 %    EOSINOPHILS 2 0.5 - 7.8 %    BASOPHILS 1 0.0 - 2.0 %    IMMATURE GRANULOCYTES 1 0.0 - 5.0 %    ABS. NEUTROPHILS 5.6 1.7 - 8.2 K/UL    ABS. LYMPHOCYTES 1.5 0.5 - 4.6 K/UL    ABS. MONOCYTES 0.6 0.1 - 1.3 K/UL    ABS. EOSINOPHILS 0.1 0.0 - 0.8 K/UL    ABS. BASOPHILS 0.1 0.0 - 0.2 K/UL    ABS. IMM.  GRANS. 0.0 0.0 - 0.5 K/UL   METABOLIC PANEL, BASIC    Collection Time: 12/22/20  9:17 AM   Result Value Ref Range    Sodium 139 136 - 145 mmol/L    Potassium 4.0 3.5 - 5.1 mmol/L    Chloride 104 98 - 107 mmol/L    CO2 29 21 - 32 mmol/L    Anion gap 6 (L) 7 - 16 mmol/L    Glucose 103 (H) 65 - 100 mg/dL    BUN 17 8 - 23 MG/DL    Creatinine 0.67 0.6 - 1.0 MG/DL    GFR est AA >60 >60 ml/min/1.73m2    GFR est non-AA >60 >60 ml/min/1.73m2    Calcium 8.9 8.3 - 10.4 MG/DL   TROPONIN-HIGH SENSITIVITY    Collection Time: 12/22/20  9:17 AM   Result Value Ref Range    Troponin-High Sensitivity 13.5 0 - 14 pg/mL   GLUCOSE, POC Collection Time: 12/22/20 11:30 AM   Result Value Ref Range    Glucose (POC) 116 (H) 65 - 100 mg/dL   GLUCOSE, POC    Collection Time: 12/22/20  3:44 PM   Result Value Ref Range    Glucose (POC) 119 (H) 65 - 100 mg/dL        Image:  I have personally reviewed patients imaging showing  XR CHEST SNGL V   Final Result   IMPRESSION:   No significant change in left basilar opacities favored represent atelectasis   with hiatal hernia. NM HEPATOBILIARY DUCT SCAN   Final Result   IMPRESSION: Negative for biliary leak. Normal activity in the liver, common bile   duct and small bowel. XR CHEST PORT   Final Result   IMPRESSION:      1. Mild bibasilar atelectasis. No focal pulmonary consolidation or pulmonary   edema. 2. Stable large hiatal hernia. Hospital problems     Principal Problem:    Acute respiratory failure with hypoxia ([address] (12/18/2020)    Active Problems:    Hypokalemia (11/27/2020)      Type 2 diabetes mellitus without complication, without long-term current use of insulin ([facility] (11/11/2020)      Essential (primary) hypertension (1/24/2014)      Overview: Last Assessment & Plan:       Recent labs reviewed. The patient blood pressure is stable. The patient       will continue current dose of amlodipine-benazepril. Assessment and Plan:   81year old AAF with PMH significant for HTN and recent allison admitted on 12/18/20 from [address] [place] [address] with acute respiratory failure with SOB and cough. 1. Acute respiratory failure with hypoxia concerning of aspiration   - CXR with bibasilar atelectasis  - COVID negative  - BNP slightly elevated  - Repeated CXR today no changes  - Ambulatory pulse Ox O2 Sat 91% RA  - SLP recs    2. S/p Cholecystectomy with GABRIEL drain with bilious drainage  - HIDA negative for biliary leak  - Surgery following      3. Hypokalemia   - Likely due to steroids  - Resolved  - Check BMP daily     4.  Type 2 diabetes mellitus without complication, without long-term current use of insulin  - Stable  - Continue Humalog SSI     5. Essential (primary) hypertension   - Continue Benicar  - Continue Norvasc     DVT Prophylaxis: Lovenox    I have reviewed, updated, and verified this note's content and spent 38 minutes of my 42 minutes visit performing counseling and coordination of care regarding medical management.        Signed By: Marie Sheehan MD     December 22, 2020

## 2020-12-23 VITALS
HEIGHT: 63 IN | RESPIRATION RATE: 17 BRPM | OXYGEN SATURATION: 90 % | BODY MASS INDEX: 32.15 KG/M2 | SYSTOLIC BLOOD PRESSURE: 102 MMHG | HEART RATE: 72 BPM | TEMPERATURE: 98.8 F | WEIGHT: 181.44 LBS | DIASTOLIC BLOOD PRESSURE: 59 MMHG

## 2020-12-23 LAB
ALBUMIN SERPL-MCNC: 2.3 G/DL (ref 3.2–4.6)
ALBUMIN/GLOB SERPL: 0.5 {RATIO} (ref 1.2–3.5)
ALP SERPL-CCNC: 111 U/L (ref 50–136)
ALT SERPL-CCNC: 62 U/L (ref 12–65)
ANION GAP SERPL CALC-SCNC: 4 MMOL/L (ref 7–16)
AST SERPL-CCNC: 32 U/L (ref 15–37)
BASOPHILS # BLD: 0 K/UL (ref 0–0.2)
BASOPHILS NFR BLD: 1 % (ref 0–2)
BILIRUB SERPL-MCNC: 0.4 MG/DL (ref 0.2–1.1)
BUN SERPL-MCNC: 13 MG/DL (ref 8–23)
CALCIUM SERPL-MCNC: 8.8 MG/DL (ref 8.3–10.4)
CHLORIDE SERPL-SCNC: 105 MMOL/L (ref 98–107)
CO2 SERPL-SCNC: 29 MMOL/L (ref 21–32)
CREAT SERPL-MCNC: 0.77 MG/DL (ref 0.6–1)
DIFFERENTIAL METHOD BLD: ABNORMAL
EOSINOPHIL # BLD: 0.2 K/UL (ref 0–0.8)
EOSINOPHIL NFR BLD: 2 % (ref 0.5–7.8)
ERYTHROCYTE [DISTWIDTH] IN BLOOD BY AUTOMATED COUNT: 15.3 % (ref 11.9–14.6)
GLOBULIN SER CALC-MCNC: 4.2 G/DL (ref 2.3–3.5)
GLUCOSE BLD STRIP.AUTO-MCNC: 110 MG/DL (ref 65–100)
GLUCOSE BLD STRIP.AUTO-MCNC: 130 MG/DL (ref 65–100)
GLUCOSE SERPL-MCNC: 112 MG/DL (ref 65–100)
HCT VFR BLD AUTO: 32.9 % (ref 35.8–46.3)
HGB BLD-MCNC: 10.7 G/DL (ref 11.7–15.4)
IMM GRANULOCYTES # BLD AUTO: 0.1 K/UL (ref 0–0.5)
IMM GRANULOCYTES NFR BLD AUTO: 1 % (ref 0–5)
LYMPHOCYTES # BLD: 1.8 K/UL (ref 0.5–4.6)
LYMPHOCYTES NFR BLD: 22 % (ref 13–44)
MCH RBC QN AUTO: 26.8 PG (ref 26.1–32.9)
MCHC RBC AUTO-ENTMCNC: 32.5 G/DL (ref 31.4–35)
MCV RBC AUTO: 82.5 FL (ref 79.6–97.8)
MONOCYTES # BLD: 0.7 K/UL (ref 0.1–1.3)
MONOCYTES NFR BLD: 8 % (ref 4–12)
NEUTS SEG # BLD: 5.6 K/UL (ref 1.7–8.2)
NEUTS SEG NFR BLD: 67 % (ref 43–78)
NRBC # BLD: 0 K/UL (ref 0–0.2)
PLATELET # BLD AUTO: 247 K/UL (ref 150–450)
PMV BLD AUTO: 10.3 FL (ref 9.4–12.3)
POTASSIUM SERPL-SCNC: 3.9 MMOL/L (ref 3.5–5.1)
PROT SERPL-MCNC: 6.5 G/DL (ref 6.3–8.2)
RBC # BLD AUTO: 3.99 M/UL (ref 4.05–5.2)
SODIUM SERPL-SCNC: 138 MMOL/L (ref 136–145)
WBC # BLD AUTO: 8.4 K/UL (ref 4.3–11.1)

## 2020-12-23 PROCEDURE — 74011250637 HC RX REV CODE- 250/637: Performed by: FAMILY MEDICINE

## 2020-12-23 PROCEDURE — 85025 COMPLETE CBC W/AUTO DIFF WBC: CPT

## 2020-12-23 PROCEDURE — 92526 ORAL FUNCTION THERAPY: CPT

## 2020-12-23 PROCEDURE — 77030038269 HC DRN EXT URIN PURWCK BARD -A

## 2020-12-23 PROCEDURE — 94761 N-INVAS EAR/PLS OXIMETRY MLT: CPT

## 2020-12-23 PROCEDURE — 36415 COLL VENOUS BLD VENIPUNCTURE: CPT

## 2020-12-23 PROCEDURE — 87635 SARS-COV-2 COVID-19 AMP PRB: CPT

## 2020-12-23 PROCEDURE — 80053 COMPREHEN METABOLIC PANEL: CPT

## 2020-12-23 PROCEDURE — 74011250636 HC RX REV CODE- 250/636: Performed by: FAMILY MEDICINE

## 2020-12-23 PROCEDURE — 74011250636 HC RX REV CODE- 250/636: Performed by: INTERNAL MEDICINE

## 2020-12-23 PROCEDURE — 82962 GLUCOSE BLOOD TEST: CPT

## 2020-12-23 RX ORDER — KETOROLAC TROMETHAMINE 15 MG/ML
15 INJECTION, SOLUTION INTRAMUSCULAR; INTRAVENOUS ONCE
Status: COMPLETED | OUTPATIENT
Start: 2020-12-23 | End: 2020-12-23

## 2020-12-23 RX ORDER — TRAMADOL HYDROCHLORIDE 50 MG/1
50 TABLET ORAL
Qty: 9 TAB | Refills: 0 | Status: SHIPPED | OUTPATIENT
Start: 2020-12-23 | End: 2020-12-26

## 2020-12-23 RX ADMIN — PANTOPRAZOLE SODIUM 40 MG: 40 TABLET, DELAYED RELEASE ORAL at 06:00

## 2020-12-23 RX ADMIN — TRAMADOL HYDROCHLORIDE 50 MG: 50 TABLET, FILM COATED ORAL at 03:54

## 2020-12-23 RX ADMIN — TIMOLOL MALEATE 1 DROP: 5 SOLUTION/ DROPS OPHTHALMIC at 08:19

## 2020-12-23 RX ADMIN — KETOROLAC TROMETHAMINE 15 MG: 15 INJECTION, SOLUTION INTRAMUSCULAR; INTRAVENOUS at 06:00

## 2020-12-23 RX ADMIN — Medication 10 ML: at 06:00

## 2020-12-23 RX ADMIN — OLMESARTAN MEDOXOMIL 20 MG: 20 TABLET, FILM COATED ORAL at 08:22

## 2020-12-23 RX ADMIN — ENOXAPARIN SODIUM 40 MG: 40 INJECTION SUBCUTANEOUS at 08:30

## 2020-12-23 RX ADMIN — AMLODIPINE BESYLATE 5 MG: 5 TABLET ORAL at 08:22

## 2020-12-23 RX ADMIN — BRIMONIDINE TARTRATE 1 DROP: 2 SOLUTION OPHTHALMIC at 08:21

## 2020-12-23 NOTE — PROGRESS NOTES
Problem: Dysphagia (Adult)  Goal: *Acute Goals and Plan of Care (Insert Text)  Description: STG: Pt will demonstrate zero signs/sx of aspiration with pureed textures with thin liquids with 90% accuracy during all meals. STG: Pt will complete a Modified barium swallow study with zero signs/sx of aspiration  with 100% accuracy during swallow study. LTG: Pt will demonstrate zero signs/sx of aspiration with least restrictive diet with 100% accuracy during all meals. Outcome: Progressing Towards Goal   SPEECH LANGUAGE PATHOLOGY: DYSPHAGIA- Daily Note and Discharge 2    NAME/AGE/GENDER: Shalini Lopez is a 81 y.o. female  DATE: 12/23/2020  PRIMARY DIAGNOSIS: Acute respiratory failure with hypoxia ([facility] [J96.01]      ICD-10: Treatment Diagnosis: R13.12 Dysphagia, Oropharyngeal Phase    RECOMMENDATIONS   DIET:   PO:  Pureed  Liquids:  regular thin    MEDICATIONS: With liquid     ASPIRATION PRECAUTIONS  Slow rate of intake  Small bites/sips  Upright at 90 degrees during meal  Alternate bite/drink     COMPENSATORY STRATEGIES/MODIFICATIONS  Alternate liquids/solids     RECOMMENDATIONS for CONTINUED SPEECH THERAPY:   No further speech therapy indicated at this time. ASSESSMENT   Patient presents with mild oropharyngeal phase dysphagia. Patient continues to feel most confident with pureed textures and declines trials of soft solids at this time. Patient tolerated puree and thin liquids via straw sip without overt s/sx. Recommend continue pureed textures with thin liquids with medications as tolerated per pt request. No further ST indicated at this time. CONTINUATION OF SKILLED SERVICES/MEDICAL NECESSITY:  No additional speech services warranted. PLAN        - Recommendations for next treatment session: No additional speech therapy indicated at this time. SUBJECTIVE   Pleasant, alert.      Oxygen Device: nasal canula   Pain: Pain Scale 1: Visual  Pain Intensity 1: 0  Pain Location 1: Chest  Pain Intervention(s) 1: Medication (see MAR)    History of Present Injury/Illness: Ms. Sulema Frazier  has a past medical history of Gastrointestinal disorder and Hypertension. . She also  has a past surgical history that includes hx gyn and hx orthopaedic. PRECAUTIONS/ALLERGIES: Patient has no known allergies. Problem List:  (Impairments causing functional limitations):  dysphagia    Previous Dysphagia: YES feel like gets stuck  Diet Prior to Evaluation: pureed/thin per pt request    Orientation:  Person  Place  Time  Situation    Cognitive-Linguistic Screening:  Speech Production:   clear  Expressive Language:  Appears WFL  Receptive Language:  Appears WFL  Cognition:   Appears WFL  Prior Level of Function: unknown  Recommendations: Given results of screening, patient appears to be functioning at baseline. No acute assessment of speech, language, or cognition warranted. OBJECTIVE   Oral Motor:   Labial: No impairment  Dentition: Limited  Oral Hygiene: Adequate  Lingual: No impairment    Swallow evaluation:   Patient consumed trials of thin liquid via cup and straw and pureed. Pt does not want solid. Pt requesting pureed textures with thin liquids. Cognition appears WFL. No further ST indicated.     Tool Used: Dysphagia Outcome and Severity Scale (HARLAN)    Score Comments   Normal Diet  [] 7 With no strategies or extra time needed   Functional Swallow  [] 6 May have mild oral or pharyngeal delay   Mild Dysphagia  [] 5 Which may require one diet consistency restricted    Mild-Moderate Dysphagia  [x] 4 With 1-2 diet consistencies restricted   Moderate Dysphagia  [] 3 With 2 or more diet consistencies restricted   Moderate-Severe Dysphagia  [] 2 With partial PO strategies (trials with ST only)   Severe Dysphagia  [] 1 With inability to tolerate any PO safely      Score:  Initial: 4 Most Recent:4 (Date 12/23/20 )   Interpretation of Tool: The Dysphagia Outcome and Severity Scale (HARLAN) is a simple, easy-to-use, 7-point scale developed to systematically rate the functional severity of dysphagia based on objective assessment and make recommendations for diet level, independence level, and type of nutrition. Current Medications:   No current facility-administered medications on file prior to encounter. Current Outpatient Medications on File Prior to Encounter   Medication Sig Dispense Refill    brimonidine-timoloL (Combigan) 0.2-0.5 % drop ophthalmic solution Administer 1 Drop to left eye two (2) times a day.  polyvinyl alcohol-povidon,PF, (Refresh Classic, PF,) 1.4-0.6 % ophthalmic solution Administer 1 Drop to both eyes as needed.  senna (Senna) 8.6 mg tablet Take 2 Tabs by mouth nightly.  acetaminophen (TylenoL) 325 mg tablet Take 650 mg by mouth every four (4) hours as needed for Pain.  ondansetron hcl (Zofran) 4 mg tablet Take 4 mg by mouth every six (6) hours as needed for Nausea or Nausea or Vomiting.  olmesartan (BENICAR) 20 mg tablet Take 20 mg by mouth daily.  amLODIPine (NORVASC) 5 mg tablet Take 5 mg by mouth daily.  omeprazole (PRILOSEC) 20 mg capsule Take 20 mg by mouth two (2) times a day. Indications: gastroesophageal reflux disease      traMADoL (ULTRAM) 50 mg tablet Take 50 mg by mouth every eight (8) hours as needed for Pain. Indications: pain      lidocaine (LIDODERM) 5 % Apply patch to the affected area for 12 hours a day and remove for 12 hours a day. 20 Each 0    clidinium-chlordiazepoxide (LIBRAX) 5-2.5 mg per capsule Take 1 Cap by mouth 4 times daily (before meals and nightly).           After treatment position/precautions:  Upright in bed  RN at bedside    Total Treatment Duration:   Time In: 0815  Time Out: [address] [facility] [facility] [facility]P

## 2020-12-23 NOTE — PROGRESS NOTES
Patient is scheduled for d/c today. Patient is agreeable to d/c plan - will go to [facility] Room 303. Jeremías Gale arranged for transport - pickup around 12:15pm.     Care Management Interventions  PCP Verified by CM:  Yes  Mode of Transport at Discharge: [address] (CM Consult): SNF  Partner SNF: Yes  Discharge Durable Medical Equipment: No  Physical Therapy Consult: Yes  Occupational Therapy Consult: Yes  Current Support Network: Nursing Facility  Confirm Follow Up Transport: Family  The Patient and/or Patient Representative was Provided with a Choice of Provider and Agrees with the Discharge Plan?: Yes  Freedom of Choice List was Provided with Basic Dialogue that Supports the Patient's Individualized Plan of Care/Goals, Treatment Preferences and Shares the Quality Data Associated with the Providers?: Yes  Discharge Location  Discharge Placement: Skilled nursing facility([facility]

## 2020-12-23 NOTE — PROGRESS NOTES
Oximetry with Exercise     Room air: SpO2 with O2 and liter flow   Resting SpO2 97% Room Air   Ambulating SpO2 94% RESULTS: Patient does not require supplemental O2 while resting or ambulated to maintain a SAT at or above 90% SpO2.

## 2020-12-23 NOTE — PROGRESS NOTES
Respiratory Care Services     Policy Number: -[id number]    Title: Home Oxygen Assessment Protocol    Effective Date:  4/9/14    Reviewed Date: 5/28/2018    Revised: 07/2019       I. Policy: The Respiratory Care Practitioner (RCP) shall assess all patients who meet Medicare's Home Oxygen Diagnostic Requirements. If a patient is unable to wean to room air within 48 hours of discharge, the RCP shall order a 3 step ambulatory oxygen saturation (SpO2) per protocol and instruct the patient in completing the test. The RCP shall document results of the test as outlined in this protocol. II. Purpose: Oxygen is used for short-term hospitalized patients and long-term therapy in patients with chronic lung disease and recurring congestive heart failure. [facility] (CMS) has very specific guidelines and indications for its short-term use in the acute care setting and the long-term use in the home or other facility. This protocol will address the rationale and requirements for long-term oxygen therapy. Long-term oxygen therapy is delivered to reduce long-term complications of chronic hypoxemia, particularly cor pulmonale. Oxygen supplementation in patients with chronic hypoxemia has been shown to improve survival, pulmonary hemodynamics, exercise capacity and neuropsychological performance. 1    III. Responsibility: Director of Yumi Alba and Respiratory Care Practitioners. IV. Home Oxygen Diagnostic Requirements:   A. Covered health conditions:  1. Severe primary lung disease  a. Chronic obstructive pulmonary disease  b. Diffuse interstitial lung disease  c. Cystic fibrosis  2. Bronchiectasis  3. Pulmonary neoplasm, primary or metastatic  4. Chronic bronchitis  5. Emphysema  6. Hypoxia-related symptoms or conditions that may improve with oxygen therapy such as  a. Pulmonary hypertension  b.  Recurring congestive heart failure due to chronic cor pulmonale  c. Erythocytosis/erythrocythemia  B. Qualifying testing requirements  1. Testing must be performed within two days prior to discharge. 2. Test results must be documented in patient's medical record in approved format. C. Testing can be done under three conditions  1. A test during rest.  a. Oxygen is considered medically necessary if SpO2 is less than or equal to 88%. b. If SpO2 is greater than 88%, proceed to step 2.  2. A test taken on room air during exercise  a. If patient meets qualifying threshold of SpO2 less than or equal to 88% while exercising, all three of the required tests below must be performed within same testing session and be recorded in the patient's medical record. i. A test taken during rest while patient breathes room air. ii. A test during exercise while patient breathes room air.  iii. A test taken during exercise with oxygen applied. (to demonstrate improvement of hypoxia). 3. A test taken during sleep.  a. If patient is tested during sleep, test must have at least two hours of recorded time. b. Test must indicate arterial oxygen saturation of 88% or less for at least 5 minutes of   testing period. c. A patient tested during sleep will not qualify for portable oxygen. d. A physician order will be required for an overnight oximetry test.  D. Regardless of test condition, the following values apply to all:  1. Group I: (Requires annual recertification)  a. Patient is on room air while at rest (awake) when tested. b.  Arterial oxygen saturation (SaO2) is at or below 88% or  c. PaO2 is at or below <55mm Hg   2. Group II: (Recertification and retesting are required 61-90 days after initial start)  a. Patient is on room air at rest while awake when tested. b. PaO2 = 5659 mmHg or  c.  SaO2 89% acceptable only with secondary diagnosis of  - Edema suggesting congestive heart failure  - Pulmonary hypertension/cor pulmonale with P wave > 3mm in lead II, III, or AVF  - Erythrocythemia with Hct >56%  3. Group III:   If PaO2 > 60 mmHg or   - SaO2 >90% there is a presumption of noncoverage. 4. If liter flow is greater than 4LPM, patient must meet Group 1 or Group II criteria while patient is receiving oxygen at a rate of 4LPM or higher  5. All patients must be tested in a stable state including those discharged from the hospital.  6. All coexisting diseases or conditions that can cause hypoxia must be treated and patient must be tested in a chronic stable state before oxygen therapy is qualified. V. Procedure for SpO2 testing at rest.  A. Obtain a 30 second room air SpO2 check while patient is on room air, rested and awake. 1. If SpO2 is 88% increase O2 by 1 L to maintain SpO2 of 90%. 2. Document results in patient's EMR.    VI. Procedure for ambulatory SpO2 testing  A. For patient who meets the Covered Health Conditions and is unable to wean to room air within 48 hours of discharge, complete an ambulatory SaO2. B. Preparation  1. Write an order for 3 step ambulatory oxygen saturation test per protocol. 2. Obtain pulse oximeter and insure that it is working accurately. 3. Perform hand hygiene per hospital policy utilizing Standard Precautions for all patients and following transmission-based isolation as indicated per hospital policy. 4. Identify patient, verify name and birth date via ID bracelet. 5. Introduce self and identify department. 6. Explain procedure to patient and family and confirm understanding. C. Assessment  1. Assure that patient is on room air prior to test.  2. Fingernail polish if worn by patient must be removed before testing. 3. Ask patient to sit in an upright position. 4. Resting SaO2 assessment  a. Perform a 30 second resting room air SaO2.  b.  If SaO2 is 88% or less Medicare considers oxygen is medically necessary  c. Document findings in patient EMR. d.  If SaO2 is greater than 88%, proceed to the next step.   5. Ambulatory Room Air SaO2 check  a. Ask patient to walk for 5 minutes on room air or as long as tolerated. b. If patients ordinarily use a cane, walker or rollator, they should be used during test.  c. Instruct patient to walk at a comfortable pace and to breathe naturally during test.  d. Monitor and record patient's heart rate, SaO2 and exercise endurance.  e. If SaO2 is 88% or less post exercise, an ambulatory test on oxygen must be performed. 6. Ambulatory SaO2 on Oxygen  a. The P shall place the patient on oxygen to achieve a SaO2 of 90%. b. Felicity Hidden patient to walk for 5 minutes or as long as tolerated. c. If patients ordinarily use a cane, walker or rollator, they should be used during test  d. Monitor and record patient's heart rate, SaO2 and exercise endurance.  e. If Sa02 decreases to the range of 80% to 84% the flow shall be increased by 2LPM.   f. If SaO2 decreases to the range of 85%-89% increased oxygen by 1 LPM.  g. If SaO2 is less than 80%, the patient is not appropriate for ambulation. VII. Documentation  A. Medicare has specific guidelines for documentation of ambulatory oxygen saturation testing, therefore all test results must be documented in the patient's EMR as outlined below. Room air: SpO2 with O2 and liter flow   Resting SpO2 ---% ---% on ---L   Ambulating SpO2 ---% RESULTS:     VIII. Reference:       Letha Brandon [address] for [facility]. Home Oxygen Therapy.  Medicare        Part B- Oxygen Coverage

## 2020-12-23 NOTE — PROGRESS NOTES
END OF SHIFT NOTE:    INTAKE/OUTPUT  12/21 0701 - 12/22 0700  In: -   Out: 200 [Urine:200]  Voiding: YES  Catheter: NO, external cath  Drain:   External Female Catheter 12/19/20 (Active)   Site Assessment Clean, dry, & intact 12/22/20 1527   Repositioned No 12/22/20 1527   Perineal Care No 12/22/20 1527   Wick Changed No 12/22/20 1527   Suction Canister/Tubing Changed No 12/22/20 1527   Urine Output (mL) 100 ml 12/22/20 1500               Flatus: Patient does have flatus present. Stool:  0 occurrences. Characteristics:    Emesis: 0 occurrences. Characteristics:        VITAL SIGNS  Patient Vitals for the past 12 hrs:   Temp Pulse Resp BP SpO2   12/22/20 1542 98.2 °F (36.8 °C) 75 18 121/75 95 %   12/22/20 1144     97 %   12/22/20 1129 97.7 °F (36.5 °C) 74 18 112/69 95 %       Pain Assessment  Pain Intensity 1: 0 (12/22/20 1509)  Pain Location 1: Rib cage  Pain Intervention(s) 1: Medication (see MAR)  Patient Stated Pain Goal: 0    Ambulating  Yes    Shift report given to oncoming nurse at the bedside.     Steve Mcclellan RN

## 2020-12-23 NOTE — DISCHARGE SUMMARY
Date of Admission: 12/18/2020  Date of Discharge: 12/23/2020    Discharge Diagnoses:  1. Acute respiratory failure with hypoxia concerning of aspiration     Discharge Medications:    Current Discharge Medication List      CONTINUE these medications which have CHANGED    Details   traMADoL (ULTRAM) 50 mg tablet Take 1 Tab by mouth every eight (8) hours as needed for Pain for up to 3 days. Max Daily Amount: 150 mg. Indications: pain  Qty: 9 Tab, Refills: 0    Associated Diagnoses: Epigastric pain; Acute calculous cholecystitis         CONTINUE these medications which have NOT CHANGED    Details   brimonidine-timoloL (Combigan) 0.2-0.5 % drop ophthalmic solution Administer 1 Drop to left eye two (2) times a day. polyvinyl alcohol-povidon,PF, (Refresh Classic, PF,) 1.4-0.6 % ophthalmic solution Administer 1 Drop to both eyes as needed. senna (Senna) 8.6 mg tablet Take 2 Tabs by mouth nightly. acetaminophen (TylenoL) 325 mg tablet Take 650 mg by mouth every four (4) hours as needed for Pain. ondansetron hcl (Zofran) 4 mg tablet Take 4 mg by mouth every six (6) hours as needed for Nausea or Nausea or Vomiting. olmesartan (BENICAR) 20 mg tablet Take 20 mg by mouth daily. amLODIPine (NORVASC) 5 mg tablet Take 5 mg by mouth daily. omeprazole (PRILOSEC) 20 mg capsule Take 20 mg by mouth two (2) times a day. Indications: gastroesophageal reflux disease      lidocaine (LIDODERM) 5 % Apply patch to the affected area for 12 hours a day and remove for 12 hours a day. Qty: 20 Each, Refills: 0      clidinium-chlordiazepoxide (LIBRAX) 5-2.5 mg per capsule Take 1 Cap by mouth 4 times daily (before meals and nightly). Pending Labs:  None    Follow-up (including scheduled tests):   Follow-up Information     Follow up With Specialties Details Why Contact Info    Other, MD Mabel    Patient can only remember the practice name and not the physician             History of Present Illness:  81yoF with PMH significant for HTN and recent allison who presents from [facility] with SOB and cough. Patient states that it has been going on for about a week. Yesterday, at [facility], she was noted to have a fever and became hypoxic, so EMS was called and brought her here. Patient is requiring 5L/m supplemental O2 currently. She denies known fevers, chills, but does have cough productive of thick sputum. Past Medical History:  Past Medical History:   Diagnosis Date    Gastrointestinal disorder     acid reflux    Hypertension        Allergies:  No Known Allergies    Hospital Course:  81year old AAF with PMH significant for HTN and recent allison admitted on 12/18/20 from [facility] with acute respiratory failure with SOB and cough. CXR unremarkable. COVID negative. SLP saw her and was not concerned for aspiration. She improved with no further intervention. She remained stable, was weaned to room air, and discharged back to [facility]. 1. Acute respiratory failure with hypoxia concerning of aspiration   - CXR with bibasilar atelectasis  - COVID negative  - BNP slightly elevated  - Repeated CXR today no changes  - Ambulatory pulse Ox O2 Sat 91% RA  - SLP recs, pureed diet     2.  S/p Cholecystectomy with GABRIEL drain with concerns of bilious drainage  - HIDA negative for biliary leak  - Surgery took drain out 12/22/20    Procedures:  None    Discharge Day Information:  Follow with PMD    Diet: pureed    Activity: as tolerated    Discharge Physical Exam:  General: Alert, oriented, NAD  HEENT: NC/AT, EOM are intact  Neck: supple, no JVD  Cardiovascular: RRR, S1, S2, no murmurs  Respiratory: Lungs are clear, no wheezes or rales  Abdomen: Soft, NT, ND  Back: No CVA tenderness, no paraspinal tenderness  Extremities: LE without pedal edema, no erythema  Neuro: A&O, CN are intact, no focal deficits  Skin: no rash or ulcers  Psych: good mood and affect    Condition: Improved    Disposition: SNF    Consultants During This Hospitalization: Surgery

## 2020-12-23 NOTE — PROGRESS NOTES
Problem: Falls - Risk of  Goal: *Absence of Falls  Description: Document Jose Eduardo Fall Risk and appropriate interventions in the flowsheet.   Outcome: Progressing Towards Goal  Note: Fall Risk Interventions:  Mobility Interventions: Communicate number of staff needed for ambulation/transfer, Patient to call before getting OOB    Mentation Interventions: Adequate sleep, hydration, pain control, Room close to nurse's station, More frequent rounding, Familiar objects from home    Medication Interventions: Patient to call before getting OOB, Teach patient to arise slowly    Elimination Interventions: Call light in reach, Patient to call for help with toileting needs    History of Falls Interventions: Bed/chair exit alarm         Problem: Patient Education: Go to Patient Education Activity  Goal: Patient/Family Education  Outcome: Progressing Towards Goal     Problem: Pain  Goal: *Control of Pain  Outcome: Progressing Towards Goal  Goal: *PALLIATIVE CARE:  Alleviation of Pain  Outcome: Progressing Towards Goal

## 2020-12-23 NOTE — PROGRESS NOTES
Planned d/c for today - to [facility]. [facility] requested a rapid covid - ordered - spoke with RN. Will continue to follow.

## 2020-12-28 LAB
COVID-19 RAPID TEST, COVR: NOT DETECTED
SARS COV-2, XPGCVT: NEGATIVE
SOURCE, COVRS: NORMAL

## 2021-01-19 ENCOUNTER — HOME HEALTH ADMISSION (OUTPATIENT)
Dept: HOME HEALTH SERVICES | Facility: HOME HEALTH | Age: 86
End: 2021-01-19

## 2021-02-25 ENCOUNTER — HOSPITAL ENCOUNTER (EMERGENCY)
Age: 86
Discharge: HOME | End: 2021-02-25
Attending: EMERGENCY MEDICINE
Payer: MEDICARE

## 2021-02-25 VITALS
BODY MASS INDEX: 30.3 KG/M2 | SYSTOLIC BLOOD PRESSURE: 170 MMHG | TEMPERATURE: 98.8 F | DIASTOLIC BLOOD PRESSURE: 83 MMHG | OXYGEN SATURATION: 97 % | WEIGHT: 171 LBS | HEIGHT: 63 IN | RESPIRATION RATE: 21 BRPM | HEART RATE: 67 BPM

## 2021-02-25 DIAGNOSIS — I10 HYPERTENSION, UNSPECIFIED TYPE: Primary | ICD-10-CM

## 2021-02-25 LAB
ALBUMIN SERPL-MCNC: 3.7 G/DL (ref 3.2–4.6)
ALBUMIN/GLOB SERPL: 0.9 {RATIO} (ref 1.2–3.5)
ALP SERPL-CCNC: 110 U/L (ref 50–136)
ALT SERPL-CCNC: 21 U/L (ref 12–65)
ANION GAP SERPL CALC-SCNC: 4 MMOL/L (ref 7–16)
AST SERPL-CCNC: 27 U/L (ref 15–37)
ATRIAL RATE: 52 BPM
BASOPHILS # BLD: 0 K/UL (ref 0–0.2)
BASOPHILS NFR BLD: 1 % (ref 0–2)
BILIRUB SERPL-MCNC: 0.5 MG/DL (ref 0.2–1.1)
BUN SERPL-MCNC: 11 MG/DL (ref 8–23)
CALCIUM SERPL-MCNC: 9.5 MG/DL (ref 8.3–10.4)
CALCULATED P AXIS, ECG09: 45 DEGREES
CALCULATED R AXIS, ECG10: 16 DEGREES
CALCULATED T AXIS, ECG11: 62 DEGREES
CHLORIDE SERPL-SCNC: 105 MMOL/L (ref 98–107)
CO2 SERPL-SCNC: 32 MMOL/L (ref 21–32)
CREAT SERPL-MCNC: 0.71 MG/DL (ref 0.6–1)
DIAGNOSIS, 93000: NORMAL
DIFFERENTIAL METHOD BLD: ABNORMAL
EOSINOPHIL # BLD: 0.3 K/UL (ref 0–0.8)
EOSINOPHIL NFR BLD: 4 % (ref 0.5–7.8)
ERYTHROCYTE [DISTWIDTH] IN BLOOD BY AUTOMATED COUNT: 16.6 % (ref 11.9–14.6)
GLOBULIN SER CALC-MCNC: 4.1 G/DL (ref 2.3–3.5)
GLUCOSE SERPL-MCNC: 102 MG/DL (ref 65–100)
HCT VFR BLD AUTO: 39.8 % (ref 35.8–46.3)
HGB BLD-MCNC: 12.8 G/DL (ref 11.7–15.4)
IMM GRANULOCYTES # BLD AUTO: 0 K/UL (ref 0–0.5)
IMM GRANULOCYTES NFR BLD AUTO: 0 % (ref 0–5)
LYMPHOCYTES # BLD: 2.7 K/UL (ref 0.5–4.6)
LYMPHOCYTES NFR BLD: 37 % (ref 13–44)
MCH RBC QN AUTO: 26 PG (ref 26.1–32.9)
MCHC RBC AUTO-ENTMCNC: 32.2 G/DL (ref 31.4–35)
MCV RBC AUTO: 80.9 FL (ref 79.6–97.8)
MONOCYTES # BLD: 0.5 K/UL (ref 0.1–1.3)
MONOCYTES NFR BLD: 7 % (ref 4–12)
NEUTS SEG # BLD: 3.7 K/UL (ref 1.7–8.2)
NEUTS SEG NFR BLD: 51 % (ref 43–78)
NRBC # BLD: 0 K/UL (ref 0–0.2)
P-R INTERVAL, ECG05: 154 MS
PLATELET # BLD AUTO: 358 K/UL (ref 150–450)
PMV BLD AUTO: 10.7 FL (ref 9.4–12.3)
POTASSIUM SERPL-SCNC: 3.8 MMOL/L (ref 3.5–5.1)
PROT SERPL-MCNC: 7.8 G/DL (ref 6.3–8.2)
Q-T INTERVAL, ECG07: 478 MS
QRS DURATION, ECG06: 74 MS
QTC CALCULATION (BEZET), ECG08: 444 MS
RBC # BLD AUTO: 4.92 M/UL (ref 4.05–5.2)
SODIUM SERPL-SCNC: 141 MMOL/L (ref 136–145)
VENTRICULAR RATE, ECG03: 52 BPM
WBC # BLD AUTO: 7.2 K/UL (ref 4.3–11.1)

## 2021-02-25 PROCEDURE — 74011250636 HC RX REV CODE- 250/636: Performed by: EMERGENCY MEDICINE

## 2021-02-25 PROCEDURE — 80053 COMPREHEN METABOLIC PANEL: CPT

## 2021-02-25 PROCEDURE — 93005 ELECTROCARDIOGRAM TRACING: CPT | Performed by: EMERGENCY MEDICINE

## 2021-02-25 PROCEDURE — 85025 COMPLETE CBC W/AUTO DIFF WBC: CPT

## 2021-02-25 PROCEDURE — 99285 EMERGENCY DEPT VISIT HI MDM: CPT

## 2021-02-25 PROCEDURE — 96374 THER/PROPH/DIAG INJ IV PUSH: CPT

## 2021-02-25 PROCEDURE — 74011250637 HC RX REV CODE- 250/637: Performed by: EMERGENCY MEDICINE

## 2021-02-25 RX ORDER — HYDRALAZINE HYDROCHLORIDE 25 MG/1
50 TABLET, FILM COATED ORAL
Status: COMPLETED | OUTPATIENT
Start: 2021-02-25 | End: 2021-02-25

## 2021-02-25 RX ORDER — HYDRALAZINE HYDROCHLORIDE 20 MG/ML
20 INJECTION INTRAMUSCULAR; INTRAVENOUS ONCE
Status: COMPLETED | OUTPATIENT
Start: 2021-02-25 | End: 2021-02-25

## 2021-02-25 RX ORDER — HYDRALAZINE HYDROCHLORIDE 25 MG/1
25 TABLET, FILM COATED ORAL 3 TIMES DAILY
Qty: 30 TAB | Refills: 0 | Status: SHIPPED | OUTPATIENT
Start: 2021-02-25

## 2021-02-25 RX ORDER — METOPROLOL SUCCINATE 50 MG/1
100 TABLET, EXTENDED RELEASE ORAL DAILY
COMMUNITY
Start: 2021-02-22

## 2021-02-25 RX ADMIN — HYDRALAZINE HYDROCHLORIDE 20 MG: 20 INJECTION INTRAMUSCULAR; INTRAVENOUS at 13:39

## 2021-02-25 RX ADMIN — HYDRALAZINE HYDROCHLORIDE 50 MG: 25 TABLET, FILM COATED ORAL at 12:32

## 2021-02-25 NOTE — ED TRIAGE NOTES
Pt c/o high blood pressure today. Pt states it was 196/98. Pt denies double or blurry vision. Pt states she has a sinus headache. Pt also c/o rash on left hand, arms and legs after increasing the benicar to 40mg three weeks ago.

## 2021-02-25 NOTE — ED NOTES
I have reviewed discharge instructions with the patient. The patient verbalized understanding. Patient left ED via Discharge Method: wheelchair to Home with son. Opportunity for questions and clarification provided. Patient given 1 scripts. To continue your aftercare when you leave the hospital, you may receive an automated call from our care team to check in on how you are doing. This is a free service and part of our promise to provide the best care and service to meet your aftercare needs.  If you have questions, or wish to unsubscribe from this service please call [phone]. Thank you for Choosing our [facility] Emergency Department.

## 2021-02-25 NOTE — ED PROVIDER NOTES
Patient with a history of hypertension. Pressure medication 3 weeks ago to Benicar 20 mg.  Recently increased to 40 mg with patient continuing to have difficulty controlling her blood pressure. Has also noticed a and intermittent rash to arms and legs with red spots. Thought due to this new medication. Present for the past 3 weeks. Patient denies any headache or blurry vision. No chest pain or shortness of breath. The history is provided by the patient and a relative. No  was used. Hypertension   The current episode started more than 1 week ago. The problem has been gradually worsening. Pertinent negatives include no chest pain, no orthopnea, no palpitations, no confusion, no blurred vision, no headaches, no neck pain, no peripheral edema, no dizziness, no shortness of breath, no nausea and no vomiting. Risk factors include hypertension. Past Medical History:   Diagnosis Date    Gastrointestinal disorder     acid reflux    Hypertension        Past Surgical History:   Procedure Laterality Date    HX GYN      cervical CA, hysterectomy    HX ORTHOPAEDIC      knee         No family history on file.     Social History     Socioeconomic History    Marital status:      Spouse name: Not on file    Number of children: Not on file    Years of education: Not on file    Highest education level: Not on file   Occupational History    Not on file   Social Needs    Financial resource strain: Not on file    Food insecurity     Worry: Not on file     Inability: Not on file    Transportation needs     Medical: Not on file     Non-medical: Not on file   Tobacco Use    Smoking status: Never Smoker    Smokeless tobacco: Never Used   Substance and Sexual Activity    Alcohol use: No    Drug use: No    Sexual activity: Never   Lifestyle    Physical activity     Days per week: Not on file     Minutes per session: Not on file    Stress: Not on file   Relationships    Social connections     Talks on phone: Not on file     Gets together: Not on file     Attends Oriental orthodox service: Not on file     Active member of club or organization: Not on file     Attends meetings of clubs or organizations: Not on file     Relationship status: Not on file    Intimate partner violence     Fear of current or ex partner: Not on file     Emotionally abused: Not on file     Physically abused: Not on file     Forced sexual activity: Not on file   Other Topics Concern    Not on file   Social History Narrative    Not on file         ALLERGIES: Patient has no known allergies. Review of Systems   Constitutional: Negative for chills and fever. Eyes: Negative for blurred vision, pain and redness. Respiratory: Negative for chest tightness, shortness of breath and wheezing. Cardiovascular: Negative for chest pain, palpitations, orthopnea and leg swelling. Gastrointestinal: Negative for abdominal pain, diarrhea, nausea and vomiting. Musculoskeletal: Negative for back pain, gait problem, neck pain and neck stiffness. Skin: Positive for rash. Negative for color change. Neurological: Negative for dizziness, weakness, numbness and headaches. Psychiatric/Behavioral: Negative for confusion. Vitals:    02/25/21 1159   BP: (!) 192/95   Pulse: (!) 53   Resp: 16   Temp: 98.8 °F (37.1 °C)   SpO2: 96%   Weight: 77.6 kg (171 lb)   Height: 5' 3\" (1.6 m)            Physical Exam  Constitutional:       Appearance: Normal appearance. She is well-developed. HENT:      Head: Normocephalic and atraumatic. Cardiovascular:      Rate and Rhythm: Regular rhythm. Bradycardia present. Pulmonary:      Effort: Pulmonary effort is normal.      Breath sounds: Normal breath sounds. Abdominal:      General: Bowel sounds are normal.      Palpations: Abdomen is soft. Tenderness: There is no abdominal tenderness. Musculoskeletal: Normal range of motion. General: No swelling.    Skin:     General: Skin is warm and dry. Findings: Rash (small erythematous patches few. ) present. No erythema. Neurological:      General: No focal deficit present. Mental Status: She is alert and oriented to person, place, and time. MDM  Number of Diagnoses or Management Options  Diagnosis management comments: Patient with hypertension. Improved here. Will discharge. Amount and/or Complexity of Data Reviewed  Clinical lab tests: ordered and reviewed    Patient Progress  Patient progress: stable         Procedures      EKG: nonspecific ST and T waves changes, sinus bradycardia. Rate 52. Results Include:    Recent Results (from the past 24 hour(s))   CBC WITH AUTOMATED DIFF    Collection Time: 02/25/21 12:13 PM   Result Value Ref Range    WBC 7.2 4.3 - 11.1 K/uL    RBC 4.92 4.05 - 5.2 M/uL    HGB 12.8 11.7 - 15.4 g/dL    HCT 39.8 35.8 - 46.3 %    MCV 80.9 79.6 - 97.8 FL    MCH 26.0 (L) 26.1 - 32.9 PG    MCHC 32.2 31.4 - 35.0 g/dL    RDW 16.6 (H) 11.9 - 14.6 %    PLATELET 159 471 - 519 K/uL    MPV 10.7 9.4 - 12.3 FL    ABSOLUTE NRBC 0.00 0.0 - 0.2 K/uL    DF AUTOMATED      NEUTROPHILS 51 43 - 78 %    LYMPHOCYTES 37 13 - 44 %    MONOCYTES 7 4.0 - 12.0 %    EOSINOPHILS 4 0.5 - 7.8 %    BASOPHILS 1 0.0 - 2.0 %    IMMATURE GRANULOCYTES 0 0.0 - 5.0 %    ABS. NEUTROPHILS 3.7 1.7 - 8.2 K/UL    ABS. LYMPHOCYTES 2.7 0.5 - 4.6 K/UL    ABS. MONOCYTES 0.5 0.1 - 1.3 K/UL    ABS. EOSINOPHILS 0.3 0.0 - 0.8 K/UL    ABS. BASOPHILS 0.0 0.0 - 0.2 K/UL    ABS. IMM.  GRANS. 0.0 0.0 - 0.5 K/UL   METABOLIC PANEL, COMPREHENSIVE    Collection Time: 02/25/21 12:13 PM   Result Value Ref Range    Sodium 141 136 - 145 mmol/L    Potassium 3.8 3.5 - 5.1 mmol/L    Chloride 105 98 - 107 mmol/L    CO2 32 21 - 32 mmol/L    Anion gap 4 (L) 7 - 16 mmol/L    Glucose 102 (H) 65 - 100 mg/dL    BUN 11 8 - 23 MG/DL    Creatinine 0.71 0.6 - 1.0 MG/DL    GFR est AA >60 >60 ml/min/1.73m2    GFR est non-AA >60 >60 ml/min/1.73m2    Calcium 9.5 8.3 - 10.4 MG/DL    Bilirubin, total 0.5 0.2 - 1.1 MG/DL    ALT (SGPT) 21 12 - 65 U/L    AST (SGOT) 27 15 - 37 U/L    Alk.  phosphatase 110 50 - 136 U/L    Protein, total 7.8 6.3 - 8.2 g/dL    Albumin 3.7 3.2 - 4.6 g/dL    Globulin 4.1 (H) 2.3 - 3.5 g/dL    A-G Ratio 0.9 (L) 1.2 - 3.5     EKG, 12 LEAD, INITIAL    Collection Time: 02/25/21 12:20 PM   Result Value Ref Range    Ventricular Rate 52 BPM    Atrial Rate 52 BPM    P-R Interval 154 ms    QRS Duration 74 ms    Q-T Interval 478 ms    QTC Calculation (Bezet) 444 ms    Calculated P Axis 45 degrees    Calculated R Axis 16 degrees    Calculated T Axis 62 degrees    Diagnosis       Sinus bradycardia  Otherwise normal ECG  When compared with ECG of 22-DEC-2020 08:10,  ST no longer elevated in Inferior leads

## 2022-03-18 PROBLEM — R10.13 EPIGASTRIC PAIN: Status: ACTIVE | Noted: 2020-11-27

## 2022-03-18 PROBLEM — R77.8 ELEVATED TROPONIN: Status: ACTIVE | Noted: 2020-11-27

## 2022-03-18 PROBLEM — R07.9 CHEST PAIN: Status: ACTIVE | Noted: 2020-11-27

## 2022-03-19 PROBLEM — K80.00 ACUTE CALCULOUS CHOLECYSTITIS: Status: ACTIVE | Noted: 2020-11-27

## 2022-03-19 PROBLEM — I10 UNCONTROLLED HYPERTENSION: Status: ACTIVE | Noted: 2020-11-27

## 2022-03-19 PROBLEM — J96.01 ACUTE RESPIRATORY FAILURE WITH HYPOXIA (HCC): Status: ACTIVE | Noted: 2020-12-18

## 2022-03-19 PROBLEM — K44.9 LARGE HIATAL HERNIA: Status: ACTIVE | Noted: 2020-11-27

## 2022-03-19 PROBLEM — R11.2 NON-INTRACTABLE VOMITING WITH NAUSEA: Status: ACTIVE | Noted: 2020-11-27

## 2022-03-19 PROBLEM — E11.9 TYPE 2 DIABETES MELLITUS WITHOUT COMPLICATION, WITHOUT LONG-TERM CURRENT USE OF INSULIN (HCC): Status: ACTIVE | Noted: 2020-11-11

## 2022-03-20 PROBLEM — E87.6 HYPOKALEMIA: Status: ACTIVE | Noted: 2020-11-27

## 2022-03-20 PROBLEM — R94.31 ABNORMAL EKG: Status: ACTIVE | Noted: 2020-11-27

## 2023-03-14 NOTE — PROGRESS NOTES
[address] [address], [address] [place] [address]      Patient:  Judy Greenwood  1935         SUBJECTIVE:  Judy Greenwood is a  81 y.o. female seen for a follow up visit regarding the following:     Chief Complaint   Patient presents with    Hypertension     CC: hypertension      HPI:   81 y.o. female  with a history of HTN, GERD, cholecystectomy here for follow up. Per recent PCP visit on 3/10/2023: Patient is currently taking hydralazine QID, clonidine BID and valsartan daily. Hydralazine was increased to 50 mg QID. Patient had adverse reactions to multiple other medications. Patient was last seen in office on 3/10/21, since then reports that she had an adverse reaction to gabapentin, stopped taking last August 2022. She denies chest pain or pressure. Reports paresthesias in extremities, feet. No other complaints at this time. Cardiovascular Testing:  - Echo 2/17/23: LVEF 58 %, moderately increased wall thickness, RV normal, Valves: trace MR.   - Echo 11/28/20: LVEF >60% , RV normal, Valves: mild MR, mild TR    Past medical history, past surgical history, family history, social history, and medications were all reviewed with the patient today and updated as necessary. Patient Active Problem List    Diagnosis Date Noted    Acute respiratory failure with hypoxia ([facility] [address] 12/18/2020    Chest pain 11/27/2020    Elevated troponin 11/27/2020    Epigastric pain 11/27/2020    Acute calculous cholecystitis 11/27/2020 12/1/20 s/p lap cholecystectomy; Dr Mcgregor General: ACUTE AND CHRONIC CHOLECYSTITIS; CHOLELITHIASIS. Electronically signed out on 12/3/2020 12:19 by Jennifer Stanley.  Nini Wan MD         Non-intractable vomiting with nausea 11/27/2020    Uncontrolled hypertension 11/27/2020    Large hiatal hernia 11/27/2020    Hypokalemia 11/27/2020    Abnormal EKG 11/27/2020    Type 2 diabetes mellitus without complication, without long-term current use of insulin ([facility] 11/11/2020    Essential (primary) hypertension 01/24/2014     Last Assessment & Plan:   Recent labs reviewed. The patient blood pressure is stable. The patient   will continue current dose of amlodipine-benazepril. No family history on file. Social History     Tobacco Use    Smoking status: Never    Smokeless tobacco: Never   Substance Use Topics    Alcohol use: No     Review of Systems   Constitutional: Negative. Cardiovascular: Negative. Negative for chest pain, dyspnea on exertion, irregular heartbeat, leg swelling and near-syncope. Respiratory:  Negative for cough and shortness of breath. Gastrointestinal: Negative. Neurological:  Positive for paresthesias. PHYSICAL EXAM:  BP (!) 166/82   Pulse 64   Ht 5' 3\" (1.6 m)   Wt 156 lb (70.8 kg)   BMI 27.63 kg/m²     Physical Exam  Vitals reviewed. Constitutional:       Appearance: She is not toxic-appearing or diaphoretic. HENT:      Head: Normocephalic and atraumatic. Cardiovascular:      Rate and Rhythm: Normal rate and regular rhythm. Heart sounds: Normal heart sounds. No murmur heard. No friction rub. No gallop. Pulmonary:      Effort: Pulmonary effort is normal. No respiratory distress. Breath sounds: No stridor. No wheezing, rhonchi or rales. Musculoskeletal:         General: No swelling. Right lower leg: No edema. Left lower leg: No edema. Skin:     General: Skin is warm and dry. Coloration: Skin is not jaundiced. Neurological:      Mental Status: She is alert and oriented to person, place, and time. Gait: Gait abnormal (in wheelchair today). Psychiatric:         Mood and Affect: Mood normal.         Behavior: Behavior normal.         Judgment: Judgment normal.     Medical problems, medical history, and test results were reviewed with the patient today. No results found for this or any previous visit (from the past 168 hour(s)).       Current Outpatient Medications:     valsartan (DIOVAN) 320 MG tablet, Take 160 mg by mouth daily, Disp: , Rfl:     fluticasone (FLONASE) 50 MCG/ACT nasal spray, 1 spray by Each Nostril route daily, Disp: , Rfl:     cetirizine (ZYRTEC) 10 MG tablet, Take 10 mg by mouth daily, Disp: , Rfl:     acetaminophen (TYLENOL) 325 MG tablet, Take 650 mg by mouth every 4 hours as needed, Disp: , Rfl:     brimonidine-timolol (COMBIGAN) 0.2-0.5 % ophthalmic solution, Apply 1 drop to eye 2 times daily, Disp: , Rfl:     hydrALAZINE (APRESOLINE) 50 MG tablet, Take 25 mg by mouth 4 times daily, Disp: , Rfl:     omeprazole (PRILOSEC) 20 MG delayed release capsule, Take 20 mg by mouth 2 times daily, Disp: , Rfl:     ondansetron (ZOFRAN) 4 MG tablet, Take 4 mg by mouth every 6 hours as needed, Disp: , Rfl:     Polyvinyl Alcohol-Povidone PF 1.4-0.6 % SOLN, Apply 1 drop to eye as needed, Disp: , Rfl:     senna (SENOKOT) 8.6 MG tablet, Take 2 tablets by mouth, Disp: , Rfl:     chlordiazePOXIDE-clidinium (LIBRAX) 5-2.5 MG per capsule, Take 1 capsule by mouth 4 times daily (before meals and nightly). (Patient not taking: Reported on 3/15/2023), Disp: , Rfl:     lidocaine (LIDODERM) 5 %, Apply patch to the affected area for 12 hours a day and remove for 12 hours a day. (Patient not taking: Reported on 3/15/2023), Disp: , Rfl:      ASSESSMENT/PLAN:    Cardiovascular Testing:  - Echo 2/17/23: LVEF 58 %, moderately increased wall thickness, RV normal, Valves: trace MR. - Echo 11/28/20: LVEF >60% , RV normal, Valves: mild MR, mild TR    1. Essential hypertension  - elevated today, if continue to have elevated readings would increase Hydralazine to 100 TID  - patient has had adverse reactions to multiple medications   - renal US GUERRERO negative for renal artery stenosis March 2021.     2. Mixed hyperlipidemia   - add low dose rosuvastatin 5 mg po daily  - check CMP and fasting lipids in 3 months (Jun 2023).      3. Type 2 diabetes mellitus without complication, without long-term current use of insulin ([facility] [address]  - per primary doctor    4. Gastroesophageal reflux disease, unspecified whether esophagitis present  - per primary doctor    5. Paresthesias   - recommend continued follow up with PCP, consider neurology evaluation. Labs from [address] dated 1/12/2023 personally reviewed, demonstrates elevated triglycerides 235, total cholesterol 220, . LFTs within normal ranges. Problem List Items Addressed This Visit          Circulatory    Uncontrolled hypertension - Primary       Endocrine    Type 2 diabetes mellitus without complication, without long-term current use of insulin ([facility] [address]     Other Visit Diagnoses       Mixed hyperlipidemia        Relevant Medications    valsartan (DIOVAN) 320 MG tablet    Gastroesophageal reflux disease, unspecified whether esophagitis present              Instructed patient go to ER or call 911/EMS should symptoms recur or worsen. Patient has been instructed and agrees to call our office with any issues or other concerns related to their cardiac condition(s) and/or complaint(s). No follow-ups on file.     Esau Cruz DO  3/15/2023 3:22 PM

## 2023-03-15 ENCOUNTER — OFFICE VISIT (OUTPATIENT)
Dept: CARDIOLOGY CLINIC | Age: 88
End: 2023-03-15

## 2023-03-15 VITALS
DIASTOLIC BLOOD PRESSURE: 82 MMHG | HEIGHT: 63 IN | SYSTOLIC BLOOD PRESSURE: 166 MMHG | HEART RATE: 64 BPM | BODY MASS INDEX: 27.64 KG/M2 | WEIGHT: 156 LBS

## 2023-03-15 DIAGNOSIS — E78.2 MIXED HYPERLIPIDEMIA: ICD-10-CM

## 2023-03-15 DIAGNOSIS — E11.9 TYPE 2 DIABETES MELLITUS WITHOUT COMPLICATION, WITHOUT LONG-TERM CURRENT USE OF INSULIN (HCC): ICD-10-CM

## 2023-03-15 DIAGNOSIS — K21.9 GASTROESOPHAGEAL REFLUX DISEASE, UNSPECIFIED WHETHER ESOPHAGITIS PRESENT: ICD-10-CM

## 2023-03-15 DIAGNOSIS — I10 ESSENTIAL HYPERTENSION: Primary | ICD-10-CM

## 2023-03-15 PROCEDURE — 99214 OFFICE O/P EST MOD 30 MIN: CPT | Performed by: INTERNAL MEDICINE

## 2023-03-15 PROCEDURE — 93000 ELECTROCARDIOGRAM COMPLETE: CPT | Performed by: INTERNAL MEDICINE

## 2023-03-15 PROCEDURE — 1123F ACP DISCUSS/DSCN MKR DOCD: CPT | Performed by: INTERNAL MEDICINE

## 2023-03-15 RX ORDER — ROSUVASTATIN CALCIUM 5 MG/1
5 TABLET, COATED ORAL DAILY
Qty: 30 TABLET | Refills: 11 | Status: SHIPPED | OUTPATIENT
Start: 2023-03-15

## 2023-03-15 RX ORDER — CETIRIZINE HYDROCHLORIDE 10 MG/1
10 TABLET ORAL DAILY
COMMUNITY

## 2023-03-15 RX ORDER — VALSARTAN 320 MG/1
160 TABLET ORAL DAILY
COMMUNITY

## 2023-03-15 RX ORDER — FLUTICASONE PROPIONATE 50 MCG
1 SPRAY, SUSPENSION (ML) NASAL DAILY
COMMUNITY

## 2023-03-15 ASSESSMENT — ENCOUNTER SYMPTOMS
GASTROINTESTINAL NEGATIVE: 1
SHORTNESS OF BREATH: 0
COUGH: 0

## 2023-03-15 NOTE — LETTER
March 15, 2023      [address] [place] [address] Jonah Sanders,  [address] [place] [address]      Patient: Mony Calvillo   MR Number: [id number]   YOB: 1935   Date of Visit: 3/15/2023       Dear Dr. Levi Page:    Thank you for referring Ventura Torres to me for evaluation/treatment. Below are the relevant portions of my assessment and plan of care. If you have questions, please do not hesitate to call me. I look forward to following Dominick along with you.     Sincerely,        Earnestine Mejia, DO    CC providers:  Leighann Espitia MD  [address] [address]  Via Fax: [phone]

## 2024-03-07 NOTE — PROGRESS NOTES
Oxygen Qualifier       Room air: SpO2 with O2 and liter flow   Resting SpO2  92%  97% on2L  100% on 3L   Ambulating SpO2  91%           Completed by:    Quan Sanchez, RT No skilled OT needs

## 2024-09-16 NOTE — PROGRESS NOTES
END OF SHIFT NOTE:     Hourly rounds were conducted. INTAKE/OUTPUT  12/22 0701 - 12/23 0700  In: -   Out: 120 [Urine:100; Drains:20]  Voiding: YES  Catheter: NO  Drain:   External Female Catheter 12/19/20 (Active)   Site Assessment Clean, dry, & intact 12/23/20 0020   Repositioned No 12/23/20 0020   Perineal Care No 12/23/20 0020   Wick Changed No 12/23/20 0020   Suction Canister/Tubing Changed No 12/23/20 0020   Urine Output (mL) 100 ml 12/22/20 1500               Flatus: Patient does have flatus present. Stool:  0 occurrences. Characteristics:  Stool Assessment  Stool Color: Brown  Stool Appearance: Soft  Stool Amount: Large  Stool Source/Status: Rectum    Emesis: 0 occurrences. Characteristics:        VITAL SIGNS  Patient Vitals for the past 12 hrs:   Temp Pulse Resp BP SpO2   12/23/20 0325 99.3 °F (37.4 °C) 71 18 113/72 96 %   12/22/20 2332 98.7 °F (37.1 °C) 71 18 111/71 97 %   12/22/20 2006 97.6 °F (36.4 °C) 78 18 110/65 96 %       Pain Assessment  Pain Intensity 1: 4 (12/23/20 0350)  Pain Location 1: Chest  Pain Intervention(s) 1: Medication (see MAR)  Patient Stated Pain Goal: 0    Ambulating  Yes    Shift report given to oncoming nurse at the bedside.     Rosalina Driscoll Patient called office and still not better and spite of pain medicine.  At this time we will refer patient to pain management for cortisone shot.  We will also increase gabapentin to 300 mg p.o. twice daily

## (undated) DEVICE — TUBING INSUFFLATION SMK EVAC HI FLO SET PNEUMOCLEAR

## (undated) DEVICE — STANDARD HYPODERMIC NEEDLE,POLYPROPYLENE HUB: Brand: MONOJECT

## (undated) DEVICE — PAD,NON-ADHERENT,3X8,STERILE,LF,1/PK: Brand: MEDLINE

## (undated) DEVICE — TRAY PREP DRY W/ PREM GLV 2 APPL 6 SPNG 2 UNDPD 1 OVERWRAP

## (undated) DEVICE — APPLICATOR COT-TIP WOOD 6IN -- NON STRL

## (undated) DEVICE — 3M™ TEGADERM™ TRANSPARENT FILM DRESSING FRAME STYLE, 1624W, 2-3/8 IN X 2-3/4 IN (6 CM X 7 CM), 100/CT 4CT/CASE: Brand: 3M™ TEGADERM™

## (undated) DEVICE — SUTURE SZ 0 27IN 5/8 CIR UR-6  TAPER PT VIOLET ABSRB VICRYL J603H

## (undated) DEVICE — 2, DISPOSABLE SUCTION/IRRIGATOR WITHOUT DISPOSABLE TIP: Brand: STRYKEFLOW

## (undated) DEVICE — COVER,LIGHT HANDLE,FLX,2/PK: Brand: MEDLINE INDUSTRIES, INC.

## (undated) DEVICE — LOGICUT SCISSOR LENGTH 320MM: Brand: LOGI - LAPAROSCOPIC INSTRUMENT SYSTEM

## (undated) DEVICE — DRAIN SURG 19FR 100% SIL RADPQ RND CHN FULL FLUT

## (undated) DEVICE — REM POLYHESIVE ADULT PATIENT RETURN ELECTRODE: Brand: VALLEYLAB

## (undated) DEVICE — CONTAINER SPEC FRMLN 120ML --

## (undated) DEVICE — SUTURE PERMAHAND SZ 2-0 L18IN NONABSORBABLE BLK L26MM PS 1588H

## (undated) DEVICE — BAG SPEC REM 224ML W4XL6IN DIA10MM 1 HND GYN DISP ENDOPCH

## (undated) DEVICE — KIT,ANTI FOG,W/SPONGE & FLUID,SOFT PACK: Brand: MEDLINE

## (undated) DEVICE — 2000CC GUARDIAN II: Brand: GUARDIAN

## (undated) DEVICE — TROCARS: Brand: KII® BLUNT TIP ACCESS SYSTEM

## (undated) DEVICE — Device

## (undated) DEVICE — TISSUE RETRIEVAL SYSTEM: Brand: INZII RETRIEVAL SYSTEM

## (undated) DEVICE — JACKSON-PRATT 100CC BULB RESERVOIR: Brand: CARDINAL HEALTH

## (undated) DEVICE — BASIC SINGLE BASIN 1-LF: Brand: MEDLINE INDUSTRIES, INC.

## (undated) DEVICE — TROCAR: Brand: KII FIOS FIRST ENTRY

## (undated) DEVICE — GARMENT,MEDLINE,DVT,INT,CALF,MED, GEN2: Brand: MEDLINE

## (undated) DEVICE — AMD ANTIMICROBIAL GAUZE SPONGES,12 PLY USP TYPE VII, 0.2% POLYHEXAMETHYLENE BIGUANIDE HCI (PHMB): Brand: CURITY

## (undated) DEVICE — SYR LR LCK 1ML GRAD NSAF 30ML --

## (undated) DEVICE — BLADELESS OPTICAL TROCAR WITH FIXATION CANNULA: Brand: VERSAPORT

## (undated) DEVICE — GENERAL LAPAROSCOPY: Brand: MEDLINE INDUSTRIES, INC.

## (undated) DEVICE — TOWEL,OR,DSP,ST,BLUE,DLX,1/PK,80PK/CS: Brand: MEDLINE

## (undated) DEVICE — DRAIN SURG 19FR 100% SIL RND END PERF

## (undated) DEVICE — APPLIER RMFG CLIP R MED/LG --

## (undated) DEVICE — SYR 10ML LUER LOK 1/5ML GRAD --

## (undated) DEVICE — TROCAR: Brand: KII® SLEEVE

## (undated) DEVICE — DRAPE,LAP,CHOLE,W/TROUGHS,STERILE: Brand: MEDLINE

## (undated) DEVICE — 3M™ TEGADERM™ TRANSPARENT FILM DRESSING FRAME STYLE, 1626W, 4 IN X 4-3/4 IN (10 CM X 12 CM), 50/CT 4CT/CASE: Brand: 3M™ TEGADERM™

## (undated) DEVICE — BLADE SURG NO15 S STL STR DISP GLASSVAN

## (undated) DEVICE — APPLIER LIG CLP L13IN 10MM PSTL GRP CONTAIN 15 TI L CLP